# Patient Record
Sex: MALE | Race: OTHER | HISPANIC OR LATINO | URBAN - METROPOLITAN AREA
[De-identification: names, ages, dates, MRNs, and addresses within clinical notes are randomized per-mention and may not be internally consistent; named-entity substitution may affect disease eponyms.]

---

## 2020-09-21 PROBLEM — Z00.00 ENCOUNTER FOR PREVENTIVE HEALTH EXAMINATION: Status: ACTIVE | Noted: 2020-09-21

## 2020-09-22 ENCOUNTER — INPATIENT (INPATIENT)
Facility: HOSPITAL | Age: 35
LOS: 10 days | Discharge: HOME HEALTH | End: 2020-10-03
Attending: STUDENT IN AN ORGANIZED HEALTH CARE EDUCATION/TRAINING PROGRAM | Admitting: STUDENT IN AN ORGANIZED HEALTH CARE EDUCATION/TRAINING PROGRAM
Payer: MEDICAID

## 2020-09-22 VITALS
SYSTOLIC BLOOD PRESSURE: 114 MMHG | TEMPERATURE: 98 F | HEART RATE: 66 BPM | DIASTOLIC BLOOD PRESSURE: 70 MMHG | OXYGEN SATURATION: 97 % | RESPIRATION RATE: 20 BRPM

## 2020-09-22 DIAGNOSIS — I50.9 HEART FAILURE, UNSPECIFIED: ICD-10-CM

## 2020-09-22 DIAGNOSIS — Z91.14 PATIENT'S OTHER NONCOMPLIANCE WITH MEDICATION REGIMEN: ICD-10-CM

## 2020-09-22 DIAGNOSIS — I10 ESSENTIAL (PRIMARY) HYPERTENSION: ICD-10-CM

## 2020-09-22 DIAGNOSIS — E78.5 HYPERLIPIDEMIA, UNSPECIFIED: ICD-10-CM

## 2020-09-22 DIAGNOSIS — Z29.9 ENCOUNTER FOR PROPHYLACTIC MEASURES, UNSPECIFIED: ICD-10-CM

## 2020-09-22 DIAGNOSIS — E11.9 TYPE 2 DIABETES MELLITUS WITHOUT COMPLICATIONS: ICD-10-CM

## 2020-09-22 DIAGNOSIS — I50.20 UNSPECIFIED SYSTOLIC (CONGESTIVE) HEART FAILURE: ICD-10-CM

## 2020-09-22 LAB
ANION GAP SERPL CALC-SCNC: 14 MMO/L — SIGNIFICANT CHANGE UP (ref 7–14)
APTT BLD: 39.1 SEC — HIGH (ref 27–36.3)
BLD GP AB SCN SERPL QL: NEGATIVE — SIGNIFICANT CHANGE UP
BUN SERPL-MCNC: 24 MG/DL — HIGH (ref 7–23)
CALCIUM SERPL-MCNC: 9.6 MG/DL — SIGNIFICANT CHANGE UP (ref 8.4–10.5)
CHLORIDE SERPL-SCNC: 95 MMOL/L — LOW (ref 98–107)
CO2 SERPL-SCNC: 25 MMOL/L — SIGNIFICANT CHANGE UP (ref 22–31)
CREAT SERPL-MCNC: 1.2 MG/DL — SIGNIFICANT CHANGE UP (ref 0.5–1.3)
GLUCOSE BLDC GLUCOMTR-MCNC: 87 MG/DL — SIGNIFICANT CHANGE UP (ref 70–99)
GLUCOSE BLDC GLUCOMTR-MCNC: 91 MG/DL — SIGNIFICANT CHANGE UP (ref 70–99)
GLUCOSE SERPL-MCNC: 114 MG/DL — HIGH (ref 70–99)
HCT VFR BLD CALC: 56.9 % — HIGH (ref 39–50)
HGB BLD-MCNC: 17.5 G/DL — HIGH (ref 13–17)
INR BLD: 1.18 — HIGH (ref 0.88–1.16)
MAGNESIUM SERPL-MCNC: 2.3 MG/DL — SIGNIFICANT CHANGE UP (ref 1.6–2.6)
MCHC RBC-ENTMCNC: 25.6 PG — LOW (ref 27–34)
MCHC RBC-ENTMCNC: 30.8 % — LOW (ref 32–36)
MCV RBC AUTO: 83.3 FL — SIGNIFICANT CHANGE UP (ref 80–100)
NRBC # FLD: 0 K/UL — SIGNIFICANT CHANGE UP (ref 0–0)
PHOSPHATE SERPL-MCNC: 3.8 MG/DL — SIGNIFICANT CHANGE UP (ref 2.5–4.5)
PLATELET # BLD AUTO: 257 K/UL — SIGNIFICANT CHANGE UP (ref 150–400)
PMV BLD: 11.7 FL — SIGNIFICANT CHANGE UP (ref 7–13)
POTASSIUM SERPL-MCNC: 4.2 MMOL/L — SIGNIFICANT CHANGE UP (ref 3.5–5.3)
POTASSIUM SERPL-SCNC: 4.2 MMOL/L — SIGNIFICANT CHANGE UP (ref 3.5–5.3)
PROTHROM AB SERPL-ACNC: 13.4 SEC — SIGNIFICANT CHANGE UP (ref 10.6–13.6)
RBC # BLD: 6.83 M/UL — HIGH (ref 4.2–5.8)
RBC # FLD: 17.2 % — HIGH (ref 10.3–14.5)
RH IG SCN BLD-IMP: POSITIVE — SIGNIFICANT CHANGE UP
SARS-COV-2 RNA SPEC QL NAA+PROBE: SIGNIFICANT CHANGE UP
SODIUM SERPL-SCNC: 134 MMOL/L — LOW (ref 135–145)
WBC # BLD: 7.81 K/UL — SIGNIFICANT CHANGE UP (ref 3.8–10.5)
WBC # FLD AUTO: 7.81 K/UL — SIGNIFICANT CHANGE UP (ref 3.8–10.5)

## 2020-09-22 PROCEDURE — 99223 1ST HOSP IP/OBS HIGH 75: CPT

## 2020-09-22 RX ORDER — ASCORBIC ACID 60 MG
500 TABLET,CHEWABLE ORAL DAILY
Refills: 0 | Status: DISCONTINUED | OUTPATIENT
Start: 2020-09-22 | End: 2020-10-03

## 2020-09-22 RX ORDER — METOPROLOL TARTRATE 50 MG
50 TABLET ORAL DAILY
Refills: 0 | Status: DISCONTINUED | OUTPATIENT
Start: 2020-09-22 | End: 2020-10-03

## 2020-09-22 RX ORDER — INSULIN LISPRO 100/ML
VIAL (ML) SUBCUTANEOUS EVERY 6 HOURS
Refills: 0 | Status: DISCONTINUED | OUTPATIENT
Start: 2020-09-22 | End: 2020-09-22

## 2020-09-22 RX ORDER — DEXTROSE 50 % IN WATER 50 %
15 SYRINGE (ML) INTRAVENOUS ONCE
Refills: 0 | Status: DISCONTINUED | OUTPATIENT
Start: 2020-09-22 | End: 2020-10-03

## 2020-09-22 RX ORDER — SODIUM CHLORIDE 9 MG/ML
1000 INJECTION, SOLUTION INTRAVENOUS
Refills: 0 | Status: DISCONTINUED | OUTPATIENT
Start: 2020-09-22 | End: 2020-10-03

## 2020-09-22 RX ORDER — DEXTROSE 50 % IN WATER 50 %
12.5 SYRINGE (ML) INTRAVENOUS ONCE
Refills: 0 | Status: DISCONTINUED | OUTPATIENT
Start: 2020-09-22 | End: 2020-10-03

## 2020-09-22 RX ORDER — GLUCAGON INJECTION, SOLUTION 0.5 MG/.1ML
1 INJECTION, SOLUTION SUBCUTANEOUS ONCE
Refills: 0 | Status: DISCONTINUED | OUTPATIENT
Start: 2020-09-22 | End: 2020-10-03

## 2020-09-22 RX ORDER — ACETAMINOPHEN 500 MG
650 TABLET ORAL EVERY 6 HOURS
Refills: 0 | Status: DISCONTINUED | OUTPATIENT
Start: 2020-09-22 | End: 2020-10-03

## 2020-09-22 RX ORDER — ATORVASTATIN CALCIUM 80 MG/1
40 TABLET, FILM COATED ORAL AT BEDTIME
Refills: 0 | Status: DISCONTINUED | OUTPATIENT
Start: 2020-09-22 | End: 2020-10-03

## 2020-09-22 RX ORDER — PANTOPRAZOLE SODIUM 20 MG/1
40 TABLET, DELAYED RELEASE ORAL
Refills: 0 | Status: DISCONTINUED | OUTPATIENT
Start: 2020-09-22 | End: 2020-10-03

## 2020-09-22 RX ORDER — ISOSORBIDE MONONITRATE 60 MG/1
30 TABLET, EXTENDED RELEASE ORAL DAILY
Refills: 0 | Status: DISCONTINUED | OUTPATIENT
Start: 2020-09-22 | End: 2020-10-03

## 2020-09-22 RX ORDER — FUROSEMIDE 40 MG
40 TABLET ORAL DAILY
Refills: 0 | Status: DISCONTINUED | OUTPATIENT
Start: 2020-09-22 | End: 2020-09-26

## 2020-09-22 RX ORDER — ENOXAPARIN SODIUM 100 MG/ML
40 INJECTION SUBCUTANEOUS DAILY
Refills: 0 | Status: DISCONTINUED | OUTPATIENT
Start: 2020-09-22 | End: 2020-09-23

## 2020-09-22 RX ORDER — THIAMINE MONONITRATE (VIT B1) 100 MG
100 TABLET ORAL DAILY
Refills: 0 | Status: DISCONTINUED | OUTPATIENT
Start: 2020-09-22 | End: 2020-10-03

## 2020-09-22 RX ORDER — DEXTROSE 50 % IN WATER 50 %
25 SYRINGE (ML) INTRAVENOUS ONCE
Refills: 0 | Status: DISCONTINUED | OUTPATIENT
Start: 2020-09-22 | End: 2020-10-03

## 2020-09-22 RX ORDER — INSULIN LISPRO 100/ML
VIAL (ML) SUBCUTANEOUS
Refills: 0 | Status: DISCONTINUED | OUTPATIENT
Start: 2020-09-22 | End: 2020-10-03

## 2020-09-22 RX ORDER — LISINOPRIL 2.5 MG/1
40 TABLET ORAL DAILY
Refills: 0 | Status: DISCONTINUED | OUTPATIENT
Start: 2020-09-22 | End: 2020-10-02

## 2020-09-22 RX ORDER — FOLIC ACID 0.8 MG
1 TABLET ORAL DAILY
Refills: 0 | Status: DISCONTINUED | OUTPATIENT
Start: 2020-09-22 | End: 2020-10-03

## 2020-09-22 RX ORDER — HYDRALAZINE HCL 50 MG
75 TABLET ORAL EVERY 8 HOURS
Refills: 0 | Status: DISCONTINUED | OUTPATIENT
Start: 2020-09-22 | End: 2020-10-03

## 2020-09-22 RX ORDER — ASPIRIN/CALCIUM CARB/MAGNESIUM 324 MG
81 TABLET ORAL DAILY
Refills: 0 | Status: DISCONTINUED | OUTPATIENT
Start: 2020-09-22 | End: 2020-10-03

## 2020-09-22 RX ORDER — INSULIN LISPRO 100/ML
VIAL (ML) SUBCUTANEOUS AT BEDTIME
Refills: 0 | Status: DISCONTINUED | OUTPATIENT
Start: 2020-09-22 | End: 2020-10-03

## 2020-09-22 RX ADMIN — Medication 75 MILLIGRAM(S): at 21:49

## 2020-09-22 RX ADMIN — ATORVASTATIN CALCIUM 40 MILLIGRAM(S): 80 TABLET, FILM COATED ORAL at 21:49

## 2020-09-22 NOTE — H&P ADULT - NSHPPHYSICALEXAM_GEN_ALL_CORE
PHYSICAL EXAM:  GENERAL: NAD, well-developed  HEAD:  Atraumatic, Normocephalic  EYES: EOMI, PERRLA, conjunctiva and sclera clear  NECK: Supple, No JVD  CHEST/LUNG: Clear to auscultation bilaterally; No wheeze  HEART: Regular rate and rhythm; No murmurs, rubs, or gallops  ABDOMEN: Soft, Nontender, Nondistended; Bowel sounds present  EXTREMITIES:  2+ Peripheral Pulses, No clubbing, cyanosis, or edema  NEUROLOGY: AAOx4, moves all extremities 5/5   SKIN: No rashes or lesions

## 2020-09-22 NOTE — CONSULT NOTE ADULT - SUBJECTIVE AND OBJECTIVE BOX
Source: patient and Chart  : [id number]     HPI:  This is a 35 year old man with a pmhx of HFrEF (30-35%), NICM, HTN, T2DM, and HLD who present to [facility] for nausea and emesis for 4 days and found to have an EF <15% likely due to medical non-compliance.     Patient stated that he was having yellow vomiting that was blood tingled. Patient stated that it was associated with a HA that did not intensity with light or noise. He stated that the HA was a 6-7/10 in intensity all over his head. He noted no alleviating or aggravating factors. He also denied trying pain medication. He also stated that his heart was palpitating for the past 4 days. He denied lightheadedness, dizziness, changes in visions, cold like symptoms, CP, palpitation, SOB, abdominal pain, n/v/d, incontinence  and loss of bowel movement, numbness, and tingling. Patient course was c/b HTN emergency in the setting of HFrEF from medication noncompliant and increased his hydralazine medication to 75mg po q8h. Patient underwent an ischemic evaluation and  Cardiac computed tomography angiography was negative per OSH record and TTE revealed worsening EF (<15%). Patient was on the following optimal HF medication:  Lipitor, Imdur metoprolol Lasix and lisinopril. Of not, patient was covid-19 negative on 9/19/2020.  Patient was transferred to [facility] for a AICD.     Upon my evaluation, patient was in SR on telemetric with no events. Labs and covid-19 test pending. Patient was consent for a AICD for primary prevention for low EF with a NYHA III despite 3months of goal directive medical therapy. Patient was consented with a  ([id number]). The risk and benefits for each procedure were explain in detail which included but not limited to bleeding, infection, stroke, cardiac tamponade and death. Patient expressed understanding an all questions were answered.      PAST MEDICAL & SURGICAL HISTORY:  Heart failure with reduced ejection fraction  DM (diabetes mellitus)  HLD (hyperlipidemia)  HTN (hypertension)    MEDICATIONS  (STANDING):    MEDICATIONS  (PRN):  acetaminophen   Tablet .. 650 milliGRAM(s) Oral every 6 hours PRN Temp greater or equal to 38C (100.4F), Mild Pain (1 - 3)      FAMILY HISTORY:  Maternal grandmother: Passed away from an MI and great grandmother has a hx of HTN    SOCIAL HISTORY:  LIVING SITUATION: Alone  CIGARETTES: denied   ALCOHOL: denied   ILLICIT DRUG USES: denied     REVIEW OF SYSTEMS:  CONSTITUTIONAL: No fever, weight loss, chills, shakes, or fatigue  EYES: No eye pain, visual disturbances, or discharge  ENMT:  No difficulty hearing, tinnitus, vertigo; No sinus or throat pain  RESPIRATORY: per HPI  CARDIOVASCULAR: per HPI  GASTROINTESTINAL: No abdominal or epigastric pain, nausea, vomiting, hematemesis, diarrhea, constipation, melena or bright red blood.  GENITOURINARY: No dysuria, nocturia, hematuria, or urinary incontinence  NEUROLOGICAL: No headaches, memory loss, slurred speech, limb weakness, loss of strength, numbness, or tremors  MUSCULOSKELETAL: No joint pain or swelling, muscle, back, or extremity pain    Vital Signs Last 24 Hrs  T(C): 36.7 (22 Sep 2020 13:30), Max: 36.7 (22 Sep 2020 13:30)  T(F): 98.1 (22 Sep 2020 13:30), Max: 98.1 (22 Sep 2020 13:30)  HR: 66 (22 Sep 2020 13:30) (66 - 66)  BP: 114/70 (22 Sep 2020 13:30) (114/70 - 114/70)  BP(mean): --  RR: 20 (22 Sep 2020 13:30) (20 - 20)  SpO2: 97% (22 Sep 2020 13:30) (97% - 97%)    PHYSICAL EXAM:  GENERAL: Well appearing, speaking in full sentence, in NAD  HEART: S1S2 RRR; No murmurs, rubs, or gallops appreciated .  PULMONARY:CTABL, normal respiratory effort.  No rales, wheezing, or rhonchi appreciated bilaterally  ABDOMEN: Bowel sounds present, soft, NDNT  EXTREMITIES:  Warm, well -perfused, no pedal edema, distal pulses present  NEUROLOGICAL:AOx3 ,  motor function grossly  intact    INTERPRETATION OF TELEMETRY: SR with in the 80-90s  ECG:pending  I&O's Detail    LABS:  pending    RADIOLOGY & ADDITIONAL STUDIES:  TTE 9/15/2020 from OSH   - LVEF <15%  - Severely decreased global LV systolic function  - Multiple LV regional WMB  - Elevated LA and LVH  - Restrictive pattern of LV diastolic filling  - Severely increased LV internal cavity size   - Moderately dilated LA  - Mild MR  - Tethered mitral chika leaflets  - moderate elevated pulmonary artery systolic pressure Source: patient and Chart  : [id number]     HPI:  This is a 35 year old man with a pmhx of HFrEF (30-35%), NICM, HTN, T2DM, and HLD who present to [facility] for nausea and emesis for 4 days and found to have an EF <15% likely due to medical non-compliance.     Patient stated that he was having yellow vomiting that was blood tingled. Emesis was non-bilious. He had CT abdomen which revealed constipation. Patient stated that vomiting was associated with a HA that did not intensity with light or noise. He stated that the HA was a 6-7/10 in intensity and "all over his head". He noted no alleviating or aggravating factors. He also denied trying pain medication. He also stated that his heart was palpitating for the past 4 days. He denied lightheadedness, dizziness, changes in visions, cold like symptoms, CP, SOB, abdominal pain, diarrhea, incontinence  and loss of bowel movement, numbness, and tingling. Patient course was c/b HTN emergency in the setting of HFrEF from medication noncompliant and his hydralazine medication was increased to 75mg po q8h. Patient underwent an ischemic evaluation and  Cardiac computed tomography angiography was negative per OSH record and TTE revealed worsening EF (<15%). Patient was on the following optimal HF medication:  Lipitor, Imdur metoprolol Lasix and lisinopril. Of not, patient was covid-19 negative on 9/19/2020.  Patient was transferred to [facility] for a AICD.     Upon my evaluation, patient was in SR on telemetric with no events. Labs and covid-19 test pending. Patient denied HA, lightheadedness, dizziness, changes in visions, cold like symptoms, CP, palpitation, SOB, abdominal pain, and n/v/d. Patient was consent for a AICD for primary prevention for low EF with a NYHA III despite 3months of goal directive medical therapy. Patient was consented with a  ([id number]). The risk and benefits for each procedure were explain in detail which included but not limited to bleeding, infection, stroke, cardiac tamponade and death. Patient expressed understanding an all questions were answered.    PAST MEDICAL & SURGICAL HISTORY:  Heart failure with reduced ejection fraction  DM (diabetes mellitus)  HLD (hyperlipidemia)  HTN (hypertension)    MEDICATIONS  (STANDING):    MEDICATIONS  (PRN):  acetaminophen   Tablet .. 650 milliGRAM(s) Oral every 6 hours PRN Temp greater or equal to 38C (100.4F), Mild Pain (1 - 3)      FAMILY HISTORY:  Maternal grandmother: Passed away from an MI and great grandmother has a hx of HTN    SOCIAL HISTORY:  LIVING SITUATION: Alone  CIGARETTES: denied   ALCOHOL: denied   ILLICIT DRUG USES: denied     REVIEW OF SYSTEMS:  CONSTITUTIONAL: No fever, weight loss, chills, shakes, or fatigue  EYES: No eye pain, visual disturbances, or discharge  ENMT:  No difficulty hearing, tinnitus, vertigo; No sinus or throat pain  RESPIRATORY: per HPI  CARDIOVASCULAR: per HPI  GASTROINTESTINAL: No abdominal or epigastric pain, nausea, vomiting, hematemesis, diarrhea, constipation, melena or bright red blood.  GENITOURINARY: No dysuria, nocturia, hematuria, or urinary incontinence  NEUROLOGICAL: No headaches, memory loss, slurred speech, limb weakness, loss of strength, numbness, or tremors  MUSCULOSKELETAL: No joint pain or swelling, muscle, back, or extremity pain    Vital Signs Last 24 Hrs  T(C): 36.7 (22 Sep 2020 13:30), Max: 36.7 (22 Sep 2020 13:30)  T(F): 98.1 (22 Sep 2020 13:30), Max: 98.1 (22 Sep 2020 13:30)  HR: 66 (22 Sep 2020 13:30) (66 - 66)  BP: 114/70 (22 Sep 2020 13:30) (114/70 - 114/70)  BP(mean): --  RR: 20 (22 Sep 2020 13:30) (20 - 20)  SpO2: 97% (22 Sep 2020 13:30) (97% - 97%)    PHYSICAL EXAM:  GENERAL: Well appearing, speaking in full sentence, in NAD  HEART: S1S2 RRR; No murmurs, rubs, or gallops appreciated .  PULMONARY:CTABL, normal respiratory effort.  No rales, wheezing, or rhonchi appreciated bilaterally  ABDOMEN: Bowel sounds present, soft, NDNT  EXTREMITIES:  Warm, well -perfused, no pedal edema, distal pulses present  NEUROLOGICAL:AOx3 ,  motor function grossly  intact    INTERPRETATION OF TELEMETRY: SR with in the 80-90s  ECG:pending  I&O's Detail    LABS:  pending    RADIOLOGY & ADDITIONAL STUDIES:  TTE 9/15/2020 from OSH   - LVEF <15%  - Severely decreased global LV systolic function  - Multiple LV regional WMB  - Elevated LA and LVH  - Restrictive pattern of LV diastolic filling  - Severely increased LV internal cavity size   - Moderately dilated LA  - Mild MR  - Tethered mitral chika leaflets  - moderate elevated pulmonary artery systolic pressure Source: patient and Chart  : [id number]     HPI:  This is a 35 year old man with a pmhx of HFrEF (30-35%), NICM, HTN, T2DM, and HLD who present to [facility] for nausea and emesis for 4 days and found to have an EF <15% likely due to medical non-compliance.     Patient stated that he was having yellow vomiting that was blood tingled. Emesis was non-bilious. He had CT abdomen which revealed constipation. Patient stated that vomiting was associated with a HA that did not intensity with light or noise. He stated that the HA was a 6-7/10 in intensity and "all over his head". He noted no alleviating or aggravating factors. He also denied trying pain medication. He also stated that his heart was palpitating for the past 4 days. He denied lightheadedness, dizziness, changes in visions, cold like symptoms, CP, SOB, abdominal pain, diarrhea, incontinence  and loss of bowel movement, numbness, and tingling. Patient course was c/b HTN emergency in the setting of HFrEF from medication noncompliant and his hydralazine medication was increased to 75mg po q8h. Patient underwent an ischemic evaluation and  Cardiac computed tomography angiography was negative per OSH record and TTE revealed worsening EF (<15%). Patient was on the following optimal HF medication:  Lipitor, Imdur metoprolol Lasix and lisinopril. Of not, patient was covid-19 negative on 9/19/2020.  Patient was transferred to [facility] for a AICD.     Upon my evaluation, patient was in SR on telemetric with no events. Labs and covid-19 test pending. Patient denied HA, lightheadedness, dizziness, changes in visions, cold like symptoms, CP, palpitation, SOB, abdominal pain, and n/v/d. Patient was consent for a AICD for primary prevention for low EF with a NYHA III despite 3months of goal directive medical therapy. Patient was consented with a  ([id number]). The risk and benefits for each procedure were explain in detail which included but not limited to bleeding, infection, stroke, cardiac tamponade and death. Patient expressed understanding an all questions were answered.    PAST MEDICAL & SURGICAL HISTORY:  Heart failure with reduced ejection fraction  DM (diabetes mellitus)  HLD (hyperlipidemia)  HTN (hypertension)    MEDICATIONS  (STANDING):    MEDICATIONS  (PRN):  acetaminophen   Tablet .. 650 milliGRAM(s) Oral every 6 hours PRN Temp greater or equal to 38C (100.4F), Mild Pain (1 - 3)      FAMILY HISTORY:  Maternal grandmother: Passed away from an MI and great grandmother has a hx of HTN    SOCIAL HISTORY:  LIVING SITUATION: Alone  CIGARETTES: denied   ALCOHOL: denied   ILLICIT DRUG USES: denied     REVIEW OF SYSTEMS:  CONSTITUTIONAL: No fever, weight loss, chills, shakes, or fatigue  EYES: No eye pain, visual disturbances, or discharge  ENMT:  No difficulty hearing, tinnitus, vertigo; No sinus or throat pain  RESPIRATORY: per HPI  CARDIOVASCULAR: per HPI  GASTROINTESTINAL: No abdominal or epigastric pain, nausea, vomiting, hematemesis, diarrhea, constipation, melena or bright red blood.  GENITOURINARY: No dysuria, nocturia, hematuria, or urinary incontinence  NEUROLOGICAL: No headaches, memory loss, slurred speech, limb weakness, loss of strength, numbness, or tremors  MUSCULOSKELETAL: No joint pain or swelling, muscle, back, or extremity pain    Vital Signs Last 24 Hrs  T(C): 36.7 (22 Sep 2020 13:30), Max: 36.7 (22 Sep 2020 13:30)  T(F): 98.1 (22 Sep 2020 13:30), Max: 98.1 (22 Sep 2020 13:30)  HR: 66 (22 Sep 2020 13:30) (66 - 66)  BP: 114/70 (22 Sep 2020 13:30) (114/70 - 114/70)  BP(mean): --  RR: 20 (22 Sep 2020 13:30) (20 - 20)  SpO2: 97% (22 Sep 2020 13:30) (97% - 97%)    PHYSICAL EXAM:  GENERAL: obese man, Well appearing, speaking in full sentence, in NAD  HEART: S1S2 RRR; No murmurs, rubs, or gallops appreciated .  PULMONARY:CTABL, normal respiratory effort.  No rales, wheezing, or rhonchi appreciated bilaterally  ABDOMEN: +BS, obese contour, soft, NDNT  EXTREMITIES:  Warm, well -perfused, no pedal edema, distal pulses present  NEUROLOGICAL:AOx3 ,  motor function grossly  intact    INTERPRETATION OF TELEMETRY: SR with in the 80-90s  ECG:pending  I&O's Detail    LABS:  pending    RADIOLOGY & ADDITIONAL STUDIES:  TTE 9/15/2020 from OSH   - LVEF <15%  - Severely decreased global LV systolic function  - Multiple LV regional WMB  - Elevated LA and LVH  - Restrictive pattern of LV diastolic filling  - Severely increased LV internal cavity size   - Moderately dilated LA  - Mild MR  - Tethered mitral chika leaflets  - moderate elevated pulmonary artery systolic pressure

## 2020-09-22 NOTE — H&P ADULT - NSHPSOCIALHISTORY_GEN_ALL_CORE
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

## 2020-09-22 NOTE — H&P ADULT - HISTORY OF PRESENT ILLNESS
***[place]  ID #[id number]***    35 year old male with hx of HTN, HLD, DM, heart failure, non ischemic dilated cardiomyopathy per cardiac CT was sent from [facility] for AICD placement. Patient went to [facility] for N/V for 3 days        Per patient, he was diagnosed with heart failure 1 year ago, does not follow up with a cardiologist and has only been on medications for 3 months because he cannot afford it. ***[facility]  ID #[id number]***    35 year old male with hx of HTN, HLD, DM, heart failure, non ischemic dilated cardiomyopathy per cardiac CT was sent from [facility] for AICD placement. Patient went to [facility] for N/V, abdominal pain for 3 days. N/V occurs after eating, mostly food, non-bilious, non-bloody. Patient was admitted to [facility] for hypertensive emergency in setting of HFrEF exacerbation from medication noncompliance. He had a recent ischemic evaluation which was negative for CAD. His most recent echo on 9/15/2020 showed EF of <15%. He was sent here for AICD evaluation. Denies abdominal pain, N/V/D, chest pain, palpitation, SOB.   Per patient, he was diagnosed with heart failure 1 year ago but does not follow up with a cardiologist. He stated that he takes his medications from "[facility]"  and has only been on medications for the last 3 months because he cannot afford it. ***[facility]  ID #[id number]***    35 year old male with hx of HTN, HLD, DM, heart failure, non ischemic dilated cardiomyopathy per cardiac CT was sent from [facility] for AICD placement. Patient went to [facility] for N/V, abdominal pain for 3 days. N/V occurs after eating, mostly food, non-bilious, non-bloody. He had CT abdomen done which showed constipation. He was found to be hypertensive and was admitted to [facility] for hypertensive emergency in setting of HFrEF exacerbation from medication noncompliance. He had a recent ischemic evaluation which was negative for CAD. His most recent echo on 9/15/2020 showed EF of <15%. He was sent here for AICD evaluation. Denies abdominal pain, N/V/D, chest pain, palpitation, SOB.   Per patient, he was diagnosed with heart failure 1 year ago but does not follow up with a cardiologist. He stated that he takes his medications from "[facility]"  and has only been on medications for the last 3 months because he cannot afford it.

## 2020-09-22 NOTE — CHART NOTE - NSCHARTNOTEFT_GEN_A_CORE
As per lab, as long as COVID swab is double-bagged, ok to send via tube. Will tube COVID swab down and follow results in anticipation of ICD tomorrow.

## 2020-09-22 NOTE — H&P ADULT - ATTENDING COMMENTS
Patient is a 35 year old man w/ past medical hx of HF reduced EF not compliant w/ medications due to not having money to pay for medications, HTN, HLD, DM who presents AICD placement. Patient EF <15% on echo at [facility]. Patient w/o acute complaints. Had presented to the outside hospital w/ complaints of nausea, abdominal pain, vomiting found to be in hypertensive urgency. Patient treated for hypertensive urgency and constipation w/ improvement of symptoms. Patient currently w/o complaints. Patient on exam sitting in chair NAD, cardiac normal S1 and S2 no murmurs, rubs, or gallops, lungs CTAB, abdomen soft non-tender, non-distended, lower extremities w/o edema, w/o rashes.   - NPO after midnight for AICD placement   - c/w metoprolol, lisinopril, lasix, hydralazine, atorvastatin  - social work consult regarding inability to afford medication  - ISS for DM management goal -180

## 2020-09-22 NOTE — H&P ADULT - PROBLEM SELECTOR PLAN 1
- non compliant with medications  - TTE on 9/15/20 with EF <15%  - no in distress, no SOB, no LE swelling   - continue lasix 40 PO daily  - continue lisinopril 40 mg PO OD  - continue metoprolol XL 50 mg daily   - EP aware of patient for AICD evaluation   - NPO for now

## 2020-09-22 NOTE — H&P ADULT - NSHPREVIEWOFSYSTEMS_GEN_ALL_CORE
CONSTITUTIONAL: denies fever, chills or night sweats.   EYES: denies eye pain or blurry vision.  ENT: denies ear pain, nose bleed or sore throat.  NECK: denies neck pain or stiffness.  RESPIRATORY: denies SOB, cough or wheezing.   CV: denies chest pain or palpitation.  GI: denies abdominal pain, N/V, diarrhea, constipation, melena or hematochezia.  : denies dysuria, hematuria or frequency.   NEUROLOGICAL: +headache; denies weakness, numbness or tingling sensation.  SKIN: denies itching, burning or rashes.   MUSCULOSKELETAL: denies joint pain, swelling or muscle pain.   HEME: denies easy bruising, or bleeding gums

## 2020-09-22 NOTE — H&P ADULT - ASSESSMENT
35 year old male with hx of HTN, HLD, DM, heart failure, non ischemic dilated cardiomyopathy per cardiac CT was admitted at [facility] for HFrEF exacerbation due to medication non-compliance. Patient was sent from [facility] for AICD placement.

## 2020-09-22 NOTE — CONSULT NOTE ADULT - ASSESSMENT
This is a 35 year old man with a pmhx of HFrEF (30-35%), NICM, HTN, T2DM, and HLD who present to [facility] for nausea and emesis for 4 days and course was c/b HTN emergency in the setting of HFrEF from medication noncompliant and increased his hydralazine medication to 75mg po q8h. Patient underwent an ischemic evaluation and cardiac computed tomography angiography was negative per OSH record and TTE revealed worsening EF (<15%). Patient was transferred to [facility] for a AICD for a AICD for primary prevention given that the patient has a low EF with a NYHA III despite more than 3months of goal directive medical therapy. Patient was consented with a  ([id number]). The risk and benefits for each procedure were explain in detail which included but not limited to bleeding, infection, stroke, cardiac tamponade and death. Patient expressed understanding an all questions were answered.      Plan:  HFrEF (<15%) plan for AICD on 9/23/2020  Continues telemetric monitor for Afib  Replete electrolytes for K<4 and Mg<2  Covid-19 negative on 9/19/2020 from OSH  Patient was consent for a AICD for primary preventative for a low EF with a NYHA III despite 3months of goal directive medical therapy. . The risk and benefits for each procedure were explain in detail which included but not limited to bleeding, infection, stroke, cardiac tamponade and death. Patient expressed understanding an all questions were answered  - NPO after MN for AICD  - Repeat COVID-19 PCR  - Ordered CMP, CBC, PT/INR, PTT  - Order and f/u EKG     Mika Carvalho PA-C  Patient to be staffed with attending. Please await attending addendum This is a 35 year old man with a pmhx of HFrEF (30-35%), NICM, HTN, T2DM, and HLD who present to [facility] for nausea and emesis for 4 days and course was c/b HTN emergency in the setting of HFrEF from medication noncompliant and increased his hydralazine medication to 75mg po q8h. Patient underwent an ischemic evaluation and cardiac computed tomography angiography was negative per OSH record and TTE revealed worsening EF (<15%). Patient was transferred to [facility] for a AICD for a AICD for primary prevention given that the patient has a low EF with a NYHA III despite more than 3months of goal directive medical therapy. Patient was consented with a  ([id number]). The risk and benefits for each procedure were explain in detail which included but not limited to bleeding, infection, stroke, cardiac tamponade and death. Patient expressed understanding an all questions were answered.    Plan:  HFrEF (<15%) plan for AICD on 9/23/2020  Continuous telemetric monitor for Afib  Replete electrolytes for K<4 and Mg<2  Covid-19 negative on 9/19/2020 from OSH  Patient was consent for a AICD for primary preventative for a low EF with a NYHA III despite 3months of goal directive medical therapy. . The risk and benefits for each procedure were explain in detail which included but not limited to bleeding, infection, stroke, cardiac tamponade and death. Patient expressed understanding an all questions were answered  - NPO after MN for AICD  - Repeat COVID-19 PCR  - Ordered CMP, CBC, PT/INR, PTT  - Order and f/u EKG   -  Post-op PPM instruction has been verbal explain and given to the patient using a  [id number]       No scrubbing the incision site for 2 weeks      No lotion, ointment, powder or direct sunlight to the incision site for 2 weeks       No lifting more than 5lb or exertional exercising such as jogging, running, bike riding for 6-8 weeks      Do not get the surgical incision wet for 1 week      No swimming pool, Jacuzzi, or bath for 6-8 weeks. Allow the water to run over the dressing after 1 week      Pt was instructed to call [phone] if the following occurs:         - fever with temperature > 100.6         - swelling, drainage or bleeding at the site incision     Mika Carvalho PA-C  Patient to be staffed with attending. Please await attending addendum

## 2020-09-23 ENCOUNTER — APPOINTMENT (OUTPATIENT)
Dept: ELECTROPHYSIOLOGY | Facility: CLINIC | Age: 35
End: 2020-09-23

## 2020-09-23 LAB
ALBUMIN SERPL ELPH-MCNC: 4.3 G/DL — SIGNIFICANT CHANGE UP (ref 3.3–5)
ALP SERPL-CCNC: 160 U/L — HIGH (ref 40–120)
ALT FLD-CCNC: 28 U/L — SIGNIFICANT CHANGE UP (ref 4–41)
ANION GAP SERPL CALC-SCNC: 16 MMO/L — HIGH (ref 7–14)
AST SERPL-CCNC: 39 U/L — SIGNIFICANT CHANGE UP (ref 4–40)
BASOPHILS # BLD AUTO: 0.08 K/UL — SIGNIFICANT CHANGE UP (ref 0–0.2)
BASOPHILS NFR BLD AUTO: 1.2 % — SIGNIFICANT CHANGE UP (ref 0–2)
BILIRUB SERPL-MCNC: 1.2 MG/DL — SIGNIFICANT CHANGE UP (ref 0.2–1.2)
BUN SERPL-MCNC: 25 MG/DL — HIGH (ref 7–23)
CALCIUM SERPL-MCNC: 10 MG/DL — SIGNIFICANT CHANGE UP (ref 8.4–10.5)
CHLORIDE SERPL-SCNC: 96 MMOL/L — LOW (ref 98–107)
CO2 SERPL-SCNC: 24 MMOL/L — SIGNIFICANT CHANGE UP (ref 22–31)
CREAT SERPL-MCNC: 1.24 MG/DL — SIGNIFICANT CHANGE UP (ref 0.5–1.3)
CULTURE RESULTS: SIGNIFICANT CHANGE UP
EOSINOPHIL # BLD AUTO: 0.18 K/UL — SIGNIFICANT CHANGE UP (ref 0–0.5)
EOSINOPHIL NFR BLD AUTO: 2.6 % — SIGNIFICANT CHANGE UP (ref 0–6)
GLUCOSE BLDC GLUCOMTR-MCNC: 114 MG/DL — HIGH (ref 70–99)
GLUCOSE BLDC GLUCOMTR-MCNC: 114 MG/DL — HIGH (ref 70–99)
GLUCOSE BLDC GLUCOMTR-MCNC: 77 MG/DL — SIGNIFICANT CHANGE UP (ref 70–99)
GLUCOSE BLDC GLUCOMTR-MCNC: 77 MG/DL — SIGNIFICANT CHANGE UP (ref 70–99)
GLUCOSE BLDC GLUCOMTR-MCNC: 80 MG/DL — SIGNIFICANT CHANGE UP (ref 70–99)
GLUCOSE BLDC GLUCOMTR-MCNC: 99 MG/DL — SIGNIFICANT CHANGE UP (ref 70–99)
GLUCOSE SERPL-MCNC: 73 MG/DL — SIGNIFICANT CHANGE UP (ref 70–99)
HBA1C BLD-MCNC: 6.6 % — HIGH (ref 4–5.6)
HCT VFR BLD CALC: 60.6 % — CRITICAL HIGH (ref 39–50)
HGB BLD-MCNC: 18.4 G/DL — HIGH (ref 13–17)
IMM GRANULOCYTES NFR BLD AUTO: 0.3 % — SIGNIFICANT CHANGE UP (ref 0–1.5)
LYMPHOCYTES # BLD AUTO: 1.75 K/UL — SIGNIFICANT CHANGE UP (ref 1–3.3)
LYMPHOCYTES # BLD AUTO: 25.7 % — SIGNIFICANT CHANGE UP (ref 13–44)
MCHC RBC-ENTMCNC: 25.3 PG — LOW (ref 27–34)
MCHC RBC-ENTMCNC: 30.4 % — LOW (ref 32–36)
MCV RBC AUTO: 83.2 FL — SIGNIFICANT CHANGE UP (ref 80–100)
MONOCYTES # BLD AUTO: 0.83 K/UL — SIGNIFICANT CHANGE UP (ref 0–0.9)
MONOCYTES NFR BLD AUTO: 12.2 % — SIGNIFICANT CHANGE UP (ref 2–14)
NEUTROPHILS # BLD AUTO: 3.96 K/UL — SIGNIFICANT CHANGE UP (ref 1.8–7.4)
NEUTROPHILS NFR BLD AUTO: 58 % — SIGNIFICANT CHANGE UP (ref 43–77)
NRBC # FLD: 0 K/UL — SIGNIFICANT CHANGE UP (ref 0–0)
PLATELET # BLD AUTO: 232 K/UL — SIGNIFICANT CHANGE UP (ref 150–400)
PMV BLD: 11.9 FL — SIGNIFICANT CHANGE UP (ref 7–13)
POTASSIUM SERPL-MCNC: 4.2 MMOL/L — SIGNIFICANT CHANGE UP (ref 3.5–5.3)
POTASSIUM SERPL-SCNC: 4.2 MMOL/L — SIGNIFICANT CHANGE UP (ref 3.5–5.3)
PROT SERPL-MCNC: 8.1 G/DL — SIGNIFICANT CHANGE UP (ref 6–8.3)
RBC # BLD: 7.28 M/UL — HIGH (ref 4.2–5.8)
RBC # FLD: 17.2 % — HIGH (ref 10.3–14.5)
SODIUM SERPL-SCNC: 136 MMOL/L — SIGNIFICANT CHANGE UP (ref 135–145)
SPECIMEN SOURCE: SIGNIFICANT CHANGE UP
WBC # BLD: 6.82 K/UL — SIGNIFICANT CHANGE UP (ref 3.8–10.5)
WBC # FLD AUTO: 6.82 K/UL — SIGNIFICANT CHANGE UP (ref 3.8–10.5)

## 2020-09-23 PROCEDURE — 99232 SBSQ HOSP IP/OBS MODERATE 35: CPT | Mod: GC

## 2020-09-23 PROCEDURE — 99233 SBSQ HOSP IP/OBS HIGH 50: CPT

## 2020-09-23 RX ORDER — DEXTROSE 50 % IN WATER 50 %
12.5 SYRINGE (ML) INTRAVENOUS ONCE
Refills: 0 | Status: COMPLETED | OUTPATIENT
Start: 2020-09-23 | End: 2020-09-23

## 2020-09-23 RX ADMIN — ENOXAPARIN SODIUM 40 MILLIGRAM(S): 100 INJECTION SUBCUTANEOUS at 13:09

## 2020-09-23 RX ADMIN — Medication 81 MILLIGRAM(S): at 13:07

## 2020-09-23 RX ADMIN — ATORVASTATIN CALCIUM 40 MILLIGRAM(S): 80 TABLET, FILM COATED ORAL at 21:28

## 2020-09-23 RX ADMIN — Medication 75 MILLIGRAM(S): at 13:10

## 2020-09-23 RX ADMIN — ISOSORBIDE MONONITRATE 30 MILLIGRAM(S): 60 TABLET, EXTENDED RELEASE ORAL at 13:11

## 2020-09-23 RX ADMIN — Medication 100 MILLIGRAM(S): at 13:11

## 2020-09-23 RX ADMIN — PANTOPRAZOLE SODIUM 40 MILLIGRAM(S): 20 TABLET, DELAYED RELEASE ORAL at 06:35

## 2020-09-23 RX ADMIN — Medication 50 MILLIGRAM(S): at 05:38

## 2020-09-23 RX ADMIN — Medication 500 MILLIGRAM(S): at 13:09

## 2020-09-23 RX ADMIN — Medication 75 MILLIGRAM(S): at 05:38

## 2020-09-23 RX ADMIN — Medication 40 MILLIGRAM(S): at 05:38

## 2020-09-23 RX ADMIN — LISINOPRIL 40 MILLIGRAM(S): 2.5 TABLET ORAL at 05:38

## 2020-09-23 RX ADMIN — Medication 12.5 GRAM(S): at 06:05

## 2020-09-23 RX ADMIN — Medication 1 MILLIGRAM(S): at 13:10

## 2020-09-23 RX ADMIN — Medication 75 MILLIGRAM(S): at 22:45

## 2020-09-23 NOTE — PROGRESS NOTE ADULT - SUBJECTIVE AND OBJECTIVE BOX
Patient is a 35y old  Male who presents with a chief complaint of AICD placement (23 Sep 2020 11:22)    PROGRESS NOTE:   Authored by Dr. Porsche Aguiar MD, pager [phone]     Patient is a 35y old  Male who presents with a chief complaint of AICD placement (23 Sep 2020 11:22)      SUBJECTIVE / OVERNIGHT EVENTS:  No acute events ON. Pt denies CP or abdominal pain.     ADDITIONAL REVIEW OF SYSTEMS:    CONSTITUTIONAL: No weakness, fevers or chills  EYES/ENT: No visual changes;  No vertigo or throat pain   NECK: No pain or stiffness  RESPIRATORY: No cough, wheezing, hemoptysis; No shortness of breath  CARDIOVASCULAR: No chest pain or palpitations  GASTROINTESTINAL: No abdominal or epigastric pain. No nausea, vomiting, or hematemesis; No diarrhea or constipation. No melena or hematochezia.  GENITOURINARY: No dysuria, frequency or hematuria  NEUROLOGICAL: No numbness or weakness  PSYCH: No A/V hallucinations  MSK: No joint pain  SKIN: No itching, rashes    MEDICATIONS  (STANDING):  ascorbic acid 500 milliGRAM(s) Oral daily  aspirin  chewable 81 milliGRAM(s) Oral daily  atorvastatin 40 milliGRAM(s) Oral at bedtime  dextrose 5%. 1000 milliLiter(s) (50 mL/Hr) IV Continuous <Continuous>  dextrose 50% Injectable 12.5 Gram(s) IV Push once  dextrose 50% Injectable 25 Gram(s) IV Push once  dextrose 50% Injectable 25 Gram(s) IV Push once  enoxaparin Injectable 40 milliGRAM(s) SubCutaneous daily  folic acid 1 milliGRAM(s) Oral daily  furosemide    Tablet 40 milliGRAM(s) Oral daily  hydrALAZINE 75 milliGRAM(s) Oral every 8 hours  insulin lispro (HumaLOG) corrective regimen sliding scale   SubCutaneous three times a day before meals  insulin lispro (HumaLOG) corrective regimen sliding scale   SubCutaneous at bedtime  isosorbide   mononitrate ER Tablet (IMDUR) 30 milliGRAM(s) Oral daily  lisinopril 40 milliGRAM(s) Oral daily  metoprolol succinate ER 50 milliGRAM(s) Oral daily  pantoprazole    Tablet 40 milliGRAM(s) Oral before breakfast  thiamine 100 milliGRAM(s) Oral daily    MEDICATIONS  (PRN):  acetaminophen   Tablet .. 650 milliGRAM(s) Oral every 6 hours PRN Temp greater or equal to 38C (100.4F), Mild Pain (1 - 3)  dextrose 40% Gel 15 Gram(s) Oral once PRN Blood Glucose LESS THAN 70 milliGRAM(s)/deciliter  glucagon  Injectable 1 milliGRAM(s) IntraMuscular once PRN Glucose LESS THAN 70 milligrams/deciliter      CAPILLARY BLOOD GLUCOSE      POCT Blood Glucose.: 99 mg/dL (23 Sep 2020 12:04)  POCT Blood Glucose.: 80 mg/dL (23 Sep 2020 08:35)  POCT Blood Glucose.: 114 mg/dL (23 Sep 2020 06:44)  POCT Blood Glucose.: 77 mg/dL (23 Sep 2020 05:46)  POCT Blood Glucose.: 87 mg/dL (22 Sep 2020 22:02)  POCT Blood Glucose.: 91 mg/dL (22 Sep 2020 17:33)    I&O's Summary    22 Sep 2020 07:01  -  23 Sep 2020 07:00  --------------------------------------------------------  IN: 400 mL / OUT: 850 mL / NET: -450 mL    23 Sep 2020 07:01  -  23 Sep 2020 14:18  --------------------------------------------------------  IN: 0 mL / OUT: 700 mL / NET: -700 mL        PHYSICAL EXAM:  Vital Signs Last 24 Hrs  T(C): 36.8 (23 Sep 2020 13:04), Max: 37 (22 Sep 2020 18:45)  T(F): 98.3 (23 Sep 2020 13:04), Max: 98.6 (22 Sep 2020 18:45)  HR: 85 (23 Sep 2020 13:04) (72 - 85)  BP: 116/86 (23 Sep 2020 13:04) (113/79 - 130/74)  BP(mean): --  RR: 18 (23 Sep 2020 13:04) (17 - 20)  SpO2: 98% (23 Sep 2020 13:04) (98% - 99%)    CONSTITUTIONAL: NAD, well-developed  RESPIRATORY: Normal respiratory effort; lungs are clear to auscultation bilaterally  CARDIOVASCULAR: Regular rate and rhythm, normal S1 and S2, no murmur/rub/gallop; 1-2+ lower extremity edema; Peripheral pulses are 2+ bilaterally  ABDOMEN: Obese,  Nontender to palpation, normoactive bowel sounds, no rebound/guarding; No hepatosplenomegaly  MUSCULOSKELETAL: no clubbing or cyanosis of digits; no joint swelling or tenderness to palpation  Neuro: Non-focal  PSYCH: A+O to person, place, and time; affect appropriate    LABS:                        18.4   6.82  )-----------( 232      ( 23 Sep 2020 05:42 )             60.6     09-23    136  |  96<L>  |  25<H>  ----------------------------<  73  4.2   |  24  |  1.24    Ca    10.0      23 Sep 2020 05:42  Phos  3.8     09-22  Mg     2.3     09-22    TPro  8.1  /  Alb  4.3  /  TBili  1.2  /  DBili  x   /  AST  39  /  ALT  28  /  AlkPhos  160<H>  09-23    PT/INR - ( 22 Sep 2020 16:30 )   PT: 13.4 SEC;   INR: 1.18          PTT - ( 22 Sep 2020 16:30 )  PTT:39.1 SEC            RADIOLOGY & ADDITIONAL TESTS:  Results Reviewed:   Imaging Personally Reviewed:  Electrocardiogram Personally Reviewed:    COORDINATION OF CARE:  Care Discussed with Consultants/Other Providers [Y/N]:  Prior or Outpatient Records Reviewed [Y/N]:    MEDICATIONS  (STANDING):  ascorbic acid 500 milliGRAM(s) Oral daily  aspirin  chewable 81 milliGRAM(s) Oral daily  atorvastatin 40 milliGRAM(s) Oral at bedtime  dextrose 5%. 1000 milliLiter(s) (50 mL/Hr) IV Continuous <Continuous>  dextrose 50% Injectable 12.5 Gram(s) IV Push once  dextrose 50% Injectable 25 Gram(s) IV Push once  dextrose 50% Injectable 25 Gram(s) IV Push once  enoxaparin Injectable 40 milliGRAM(s) SubCutaneous daily  folic acid 1 milliGRAM(s) Oral daily  furosemide    Tablet 40 milliGRAM(s) Oral daily  hydrALAZINE 75 milliGRAM(s) Oral every 8 hours  insulin lispro (HumaLOG) corrective regimen sliding scale   SubCutaneous three times a day before meals  insulin lispro (HumaLOG) corrective regimen sliding scale   SubCutaneous at bedtime  isosorbide   mononitrate ER Tablet (IMDUR) 30 milliGRAM(s) Oral daily  lisinopril 40 milliGRAM(s) Oral daily  metoprolol succinate ER 50 milliGRAM(s) Oral daily  pantoprazole    Tablet 40 milliGRAM(s) Oral before breakfast  thiamine 100 milliGRAM(s) Oral daily    MEDICATIONS  (PRN):  acetaminophen   Tablet .. 650 milliGRAM(s) Oral every 6 hours PRN Temp greater or equal to 38C (100.4F), Mild Pain (1 - 3)  dextrose 40% Gel 15 Gram(s) Oral once PRN Blood Glucose LESS THAN 70 milliGRAM(s)/deciliter  glucagon  Injectable 1 milliGRAM(s) IntraMuscular once PRN Glucose LESS THAN 70 milligrams/deciliter      CAPILLARY BLOOD GLUCOSE      POCT Blood Glucose.: 99 mg/dL (23 Sep 2020 12:04)  POCT Blood Glucose.: 80 mg/dL (23 Sep 2020 08:35)  POCT Blood Glucose.: 114 mg/dL (23 Sep 2020 06:44)  POCT Blood Glucose.: 77 mg/dL (23 Sep 2020 05:46)  POCT Blood Glucose.: 87 mg/dL (22 Sep 2020 22:02)  POCT Blood Glucose.: 91 mg/dL (22 Sep 2020 17:33)    I&O's Summary    22 Sep 2020 07:01  -  23 Sep 2020 07:00  --------------------------------------------------------  IN: 400 mL / OUT: 850 mL / NET: -450 mL    23 Sep 2020 07:01  -  23 Sep 2020 14:18  --------------------------------------------------------  IN: 0 mL / OUT: 700 mL / NET: -700 mL        PHYSICAL EXAM:  Vital Signs Last 24 Hrs  T(C): 36.8 (23 Sep 2020 13:04), Max: 37 (22 Sep 2020 18:45)  T(F): 98.3 (23 Sep 2020 13:04), Max: 98.6 (22 Sep 2020 18:45)  HR: 85 (23 Sep 2020 13:04) (72 - 85)  BP: 116/86 (23 Sep 2020 13:04) (113/79 - 130/74)  BP(mean): --  RR: 18 (23 Sep 2020 13:04) (17 - 20)  SpO2: 98% (23 Sep 2020 13:04) (98% - 99%)    CONSTITUTIONAL: NAD, well-developed  RESPIRATORY: Normal respiratory effort; lungs are clear to auscultation bilaterally  CARDIOVASCULAR: Regular rate and rhythm, normal S1 and S2, no murmur/rub/gallop; No lower extremity edema; Peripheral pulses are 2+ bilaterally  ABDOMEN: Nontender to palpation, normoactive bowel sounds, no rebound/guarding; No hepatosplenomegaly  MUSCULOSKELETAL: no clubbing or cyanosis of digits; no joint swelling or tenderness to palpation  PSYCH: A+O to person, place, and time; affect appropriate    LABS:                        18.4   6.82  )-----------( 232      ( 23 Sep 2020 05:42 )             60.6     09-23    136  |  96<L>  |  25<H>  ----------------------------<  73  4.2   |  24  |  1.24    Ca    10.0      23 Sep 2020 05:42  Phos  3.8     09-22  Mg     2.3     09-22    TPro  8.1  /  Alb  4.3  /  TBili  1.2  /  DBili  x   /  AST  39  /  ALT  28  /  AlkPhos  160<H>  09-23    PT/INR - ( 22 Sep 2020 16:30 )   PT: 13.4 SEC;   INR: 1.18          PTT - ( 22 Sep 2020 16:30 )  PTT:39.1 SEC            RADIOLOGY & ADDITIONAL TESTS:  Results Reviewed:   Imaging Personally Reviewed:  Electrocardiogram Personally Reviewed:    COORDINATION OF CARE:  Care Discussed with Consultants/Other Providers [Y/N]:  Prior or Outpatient Records Reviewed [Y/N]:

## 2020-09-23 NOTE — PROGRESS NOTE ADULT - SUBJECTIVE AND OBJECTIVE BOX
[id number]  Subjective: No new complaints. Denies HA, lightheadedness, dizziness, CP, SOB, abdominal pain, N/V.      Interval events:  - Tansferred from OSH for AICD placement  -Tentatively plan for AICD placement today. Patient should remain NPO until after the procedure    -Patient was consent for a AICD for primary preventative for a low EF with a NYHA III despite 3months of goal directive medical therapy. . The risk and benefits for each procedure were explain in detail which included but not limited to bleeding, infection, stroke, cardiac tamponade and death. Patient expressed understanding an all questions were answered  - Post-op PPM instruction has been verbal explain and given to the patient using a  [id number]      MEDICATIONS  (STANDING):  ascorbic acid 500 milliGRAM(s) Oral daily  aspirin  chewable 81 milliGRAM(s) Oral daily  atorvastatin 40 milliGRAM(s) Oral at bedtime  dextrose 5%. 1000 milliLiter(s) (50 mL/Hr) IV Continuous <Continuous>  dextrose 50% Injectable 12.5 Gram(s) IV Push once  dextrose 50% Injectable 25 Gram(s) IV Push once  dextrose 50% Injectable 25 Gram(s) IV Push once  enoxaparin Injectable 40 milliGRAM(s) SubCutaneous daily  folic acid 1 milliGRAM(s) Oral daily  furosemide    Tablet 40 milliGRAM(s) Oral daily  hydrALAZINE 75 milliGRAM(s) Oral every 8 hours  insulin lispro (HumaLOG) corrective regimen sliding scale   SubCutaneous three times a day before meals  insulin lispro (HumaLOG) corrective regimen sliding scale   SubCutaneous at bedtime  isosorbide   mononitrate ER Tablet (IMDUR) 30 milliGRAM(s) Oral daily  lisinopril 40 milliGRAM(s) Oral daily  metoprolol succinate ER 50 milliGRAM(s) Oral daily  pantoprazole    Tablet 40 milliGRAM(s) Oral before breakfast  thiamine 100 milliGRAM(s) Oral daily    MEDICATIONS  (PRN):  acetaminophen   Tablet .. 650 milliGRAM(s) Oral every 6 hours PRN Temp greater or equal to 38C (100.4F), Mild Pain (1 - 3)  dextrose 40% Gel 15 Gram(s) Oral once PRN Blood Glucose LESS THAN 70 milliGRAM(s)/deciliter  glucagon  Injectable 1 milliGRAM(s) IntraMuscular once PRN Glucose LESS THAN 70 milligrams/deciliter    Vital Signs Last 24 Hrs  T(C): 36.2 (23 Sep 2020 05:30), Max: 37 (22 Sep 2020 18:45)  T(F): 97.1 (23 Sep 2020 05:30), Max: 98.6 (22 Sep 2020 18:45)  HR: 72 (23 Sep 2020 05:30) (66 - 77)  BP: 126/97 (23 Sep 2020 05:30) (113/79 - 130/74)  BP(mean): --  RR: 17 (23 Sep 2020 05:30) (17 - 20)  SpO2: 98% (23 Sep 2020 05:30) (97% - 99%)  I&O's Detail    22 Sep 2020 07:01  -  23 Sep 2020 07:00  --------------------------------------------------------  IN:    Oral Fluid: 400 mL  Total IN: 400 mL    OUT:    Voided (mL): 850 mL  Total OUT: 850 mL    Total NET: -450 mL      23 Sep 2020 07:01  -  23 Sep 2020 11:22  --------------------------------------------------------  IN:  Total IN: 0 mL    OUT:    Oral Fluid: 0 mL    Voided (mL): 400 mL  Total OUT: 400 mL    Total NET: -400 mL          Physical Exam:  GENERAL: Lying in bed, well appearing, speaking in full sentence in Greek with the , in NAD  ENMT: No tonsillar erythema, exudates, or enlargement; Moist mucous membranes, Good dentition, No lesions  NECK: Supple.   HEART: S1S2 RRR; No murmurs, rubs, or gallops appreciated .  PULMONARY:CTABL, normal respiratory effort.  No rales, wheezing, or rhonchi appreciated bilaterally  ABDOMEN: +BS, obese contour, soft, NDNTABDOMEN  EXTREMITIES:  Warm, well -perfused, no pedal edema, distal pulses present    TELEMETERIC: SR 70-90                            18.4   6.82  )-----------( 232      ( 23 Sep 2020 05:42 )             60.6     PT/INR - ( 22 Sep 2020 16:30 )   PT: 13.4 SEC;   INR: 1.18          PTT - ( 22 Sep 2020 16:30 )  PTT:39.1 SEC  09-23    136  |  96<L>  |  25<H>  ----------------------------<  73  4.2   |  24  |  1.24    Ca    10.0      23 Sep 2020 05:42  Phos  3.8     09-22  Mg     2.3     09-22  TTE 9/15/2020 from OSH   - LVEF <15%  - Severely decreased global LV systolic function  - Multiple LV regional WMB  - Elevated LA and LVH  - Restrictive pattern of LV diastolic filling  - Severely increased LV internal cavity size   - Moderately dilated LA  - Mild MR  - Tethered mitral chika leaflets  - moderate elevated pulmonary artery systolic pressure  TPro  8.1  /  Alb  4.3  /  TBili  1.2  /  DBili  x   /  AST  39  /  ALT  28  /  AlkPhos  160<H>  09-23          [id number]  Subjective: No new complaints. Denies HA, lightheadedness, dizziness, CP, SOB, abdominal pain, N/V.      Interval events:  - Tansferred from OSH for AICD placement  - AICD placement postponed today given elevated HCT given increase risk of thrombosis. Using a  [id number], patient was verbally made aware of this plan.  - Patient was consent for a AICD for primary preventative for a low EF with a NYHA III despite 3months of goal directive medical therapy. The risk and benefits for each procedure were explain in detail which included but not limited to bleeding, infection, stroke, cardiac tamponade and death. Patient expressed understanding an all questions were answered  - Post-op PPM instruction has been verbal explain and given to the patient using a  [id number]    MEDICATIONS  (STANDING):  ascorbic acid 500 milliGRAM(s) Oral daily  aspirin  chewable 81 milliGRAM(s) Oral daily  atorvastatin 40 milliGRAM(s) Oral at bedtime  dextrose 5%. 1000 milliLiter(s) (50 mL/Hr) IV Continuous <Continuous>  dextrose 50% Injectable 12.5 Gram(s) IV Push once  dextrose 50% Injectable 25 Gram(s) IV Push once  dextrose 50% Injectable 25 Gram(s) IV Push once  enoxaparin Injectable 40 milliGRAM(s) SubCutaneous daily  folic acid 1 milliGRAM(s) Oral daily  furosemide    Tablet 40 milliGRAM(s) Oral daily  hydrALAZINE 75 milliGRAM(s) Oral every 8 hours  insulin lispro (HumaLOG) corrective regimen sliding scale   SubCutaneous three times a day before meals  insulin lispro (HumaLOG) corrective regimen sliding scale   SubCutaneous at bedtime  isosorbide   mononitrate ER Tablet (IMDUR) 30 milliGRAM(s) Oral daily  lisinopril 40 milliGRAM(s) Oral daily  metoprolol succinate ER 50 milliGRAM(s) Oral daily  pantoprazole    Tablet 40 milliGRAM(s) Oral before breakfast  thiamine 100 milliGRAM(s) Oral daily    MEDICATIONS  (PRN):  acetaminophen   Tablet .. 650 milliGRAM(s) Oral every 6 hours PRN Temp greater or equal to 38C (100.4F), Mild Pain (1 - 3)  dextrose 40% Gel 15 Gram(s) Oral once PRN Blood Glucose LESS THAN 70 milliGRAM(s)/deciliter  glucagon  Injectable 1 milliGRAM(s) IntraMuscular once PRN Glucose LESS THAN 70 milligrams/deciliter    Vital Signs Last 24 Hrs  T(C): 36.2 (23 Sep 2020 05:30), Max: 37 (22 Sep 2020 18:45)  T(F): 97.1 (23 Sep 2020 05:30), Max: 98.6 (22 Sep 2020 18:45)  HR: 72 (23 Sep 2020 05:30) (66 - 77)  BP: 126/97 (23 Sep 2020 05:30) (113/79 - 130/74)  BP(mean): --  RR: 17 (23 Sep 2020 05:30) (17 - 20)  SpO2: 98% (23 Sep 2020 05:30) (97% - 99%)  I&O's Detail    22 Sep 2020 07:01  -  23 Sep 2020 07:00  --------------------------------------------------------  IN:    Oral Fluid: 400 mL  Total IN: 400 mL    OUT:    Voided (mL): 850 mL  Total OUT: 850 mL    Total NET: -450 mL      23 Sep 2020 07:01  -  23 Sep 2020 11:22  --------------------------------------------------------  IN:  Total IN: 0 mL    OUT:    Oral Fluid: 0 mL    Voided (mL): 400 mL  Total OUT: 400 mL    Total NET: -400 mL      Physical Exam:  GENERAL: Lying in bed, well appearing, speaking in full sentence in [place]ian with the , in NAD  ENMT: No tonsillar erythema, exudates, or enlargement; Moist mucous membranes, Good dentition, No lesions  NECK: Supple.   HEART: S1S2 RRR; No murmurs, rubs, or gallops appreciated .  PULMONARY:CTABL, normal respiratory effort.  No rales, wheezing, or rhonchi appreciated bilaterally  ABDOMEN: +BS, obese contour, soft, NDNT  EXTREMITIES:  Warm, well -perfused, no pedal edema, distal pulses present    TELEMETERIC: SR 70-90                          18.4   6.82  )-----------( 232      ( 23 Sep 2020 05:42 )             60.6     PT/INR - ( 22 Sep 2020 16:30 )   PT: 13.4 SEC;   INR: 1.18          PTT - ( 22 Sep 2020 16:30 )  PTT:39.1 SEC  09-23    136  |  96<L>  |  25<H>  ----------------------------<  73  4.2   |  24  |  1.24    Ca    10.0      23 Sep 2020 05:42  Phos  3.8     09-22  Mg     2.3     09-22    TTE 9/15/2020 from OSH   - LVEF <15%  - Severely decreased global LV systolic function  - Multiple LV regional WMB  - Elevated LA and LVH  - Restrictive pattern of LV diastolic filling  - Severely increased LV internal cavity size   - Moderately dilated LA  - Mild MR  - Tethered mitral chika leaflets  - moderate elevated pulmonary artery systolic pressure

## 2020-09-23 NOTE — PROGRESS NOTE ADULT - ASSESSMENT
This is a 35 year old man with a pmhx of HFrEF (30-35%), NICM, HTN, T2DM, and HLD who present to an OSH for nausea and emesis for 4 days and course was c/b HTN emergency in the setting of HFrEF from medication noncompliant and increased his hydralazine medication to 75mg po q8h. Patient underwent an ischemic evaluation and cardiac computed tomography angiography was negative per OSH record and TTE revealed worsening EF (<15%). Patient was transferred to [facility] for a AICD for a AICD for primary prevention given that the patient has a low EF with a NYHA III despite more than 3months of goal directive medical therapy. Patient was consented with a  ([id number]). The risk and benefits for each procedure were explain in detail which included but not limited to bleeding, infection, stroke, cardiac tamponade and death. Patient expressed understanding an all questions were answered.    Plan:  HFrEF (<15%) plan for AICD on 9/23/2020  -Continuous telemetric monitor for Afib  -Replete electrolytes for K<4 and Mg<2  - Covid-19 negative on 9/19/2020 from OSH and on 9/22/2020  -Patient was consent for a AICD for primary preventative for a low EF with a NYHA III despite 3months of goal directive medical therapy . The risk and benefits for each procedure were explain in detail which included but not limited to bleeding, infection, stroke, cardiac tamponade and death. Patient expressed understanding an all questions were answered  - NPO after until after AICD is placed  -  Post-op PPM instruction has been verbal explain and given to the patient using a  [id number]      No scrubbing the incision site for 2 weeks      No lotion, ointment, powder or direct sunlight to the incision site for 2 weeks       No lifting more than 5lb or exertional exercising such as jogging, running, bike riding for 6-8 weeks      Do not get the surgical incision wet for 1 week      No swimming pool, Jacuzzi, or bath for 6-8 weeks. Allow the water to run over the dressing after 1 week      Pt was instructed to call [phone] if the following occurs:         - fever with temperature > 100.6         - swelling, drainage or bleeding at the site incision     Mika Carvalho PA-C  Patient to be staff with attending. Please awaiting attending addendum      This is a 35 year old man with a pmhx of HFrEF (30-35%), NICM, HTN, T2DM, and HLD who present to an OSH for nausea and emesis for 4 days and course was c/b HTN emergency in the setting of HFrEF from medication noncompliant and increased his hydralazine medication to 75mg po q8h. Patient underwent an ischemic evaluation and cardiac computed tomography angiography was negative per OSH record and TTE revealed worsening EF (<15%). Patient was transferred to [facility] for a AICD for a AICD for primary prevention given that the patient has a low EF with a NYHA III despite more than 3months of goal directive medical therapy. Patient was consented with a  ([id number]). The risk and benefits for each procedure were explain in detail which included but not limited to bleeding, infection, stroke, cardiac tamponade and death. Patient expressed understanding an all questions were answered. Awaiting Hematology recommendation prior to AICD placement     Plan:  HFrEF (<15%) plan for AICD on 9/23/2020  - Continuous telemetric monitor for Afib  - Replete electrolytes for K<4 and Mg<2  - Covid-19 negative on 9/19/2020 from OSH and on 9/22/2020  - Patient was consent for a AICD for primary preventative for a low EF with a NYHA III despite 3months of goal directive medical therapy . The risk and benefits for each procedure were explain in detail which included but not limited to bleeding, infection, stroke, cardiac tamponade and death. Patient expressed understanding an all questions were answered  - Consult hematology for elevated HCT. Will postponed AICD placement until hematology weights in given increase risk for thrombosis   - Post-op PPM instruction has been verbal explain and given to the patient using a  [id number]      No scrubbing the incision site for 2 weeks      No lotion, ointment, powder or direct sunlight to the incision site for 2 weeks       No lifting more than 5lb or exertional exercising such as jogging, running, bike riding for 6-8 weeks      Do not get the surgical incision wet for 1 week      No swimming pool, Jacuzzi, or bath for 6-8 weeks. Allow the water to run over the dressing after 1 week      Pt was instructed to call [phone] if the following occurs:         - fever with temperature > 100.6         - swelling, drainage or bleeding at the site incision     Mika Carvalho PA-C  Patient to be staff with attending. Please awaiting attending addendum

## 2020-09-23 NOTE — PROGRESS NOTE ADULT - ASSESSMENT
35 year old male with hx of HTN, HLD, DM, heart failure, non ischemic dilated cardiomyopathy per cardiac CT was admitted at [facility] for HFrEF exacerbation due to medication non-compliance. Patient was sent from [facility] for AICD placement, scheduled for today 9/23

## 2020-09-24 DIAGNOSIS — D75.1 SECONDARY POLYCYTHEMIA: ICD-10-CM

## 2020-09-24 LAB
ANION GAP SERPL CALC-SCNC: 19 MMO/L — HIGH (ref 7–14)
BASOPHILS # BLD AUTO: 0.1 K/UL — SIGNIFICANT CHANGE UP (ref 0–0.2)
BASOPHILS NFR BLD AUTO: 1.4 % — SIGNIFICANT CHANGE UP (ref 0–2)
BUN SERPL-MCNC: 28 MG/DL — HIGH (ref 7–23)
CALCIUM SERPL-MCNC: 10.3 MG/DL — SIGNIFICANT CHANGE UP (ref 8.4–10.5)
CHLORIDE SERPL-SCNC: 93 MMOL/L — LOW (ref 98–107)
CO2 SERPL-SCNC: 21 MMOL/L — LOW (ref 22–31)
CREAT SERPL-MCNC: 1.3 MG/DL — SIGNIFICANT CHANGE UP (ref 0.5–1.3)
EOSINOPHIL # BLD AUTO: 0.14 K/UL — SIGNIFICANT CHANGE UP (ref 0–0.5)
EOSINOPHIL NFR BLD AUTO: 2 % — SIGNIFICANT CHANGE UP (ref 0–6)
FERRITIN SERPL-MCNC: 231.8 NG/ML — SIGNIFICANT CHANGE UP (ref 30–400)
GLUCOSE BLDC GLUCOMTR-MCNC: 101 MG/DL — HIGH (ref 70–99)
GLUCOSE BLDC GLUCOMTR-MCNC: 104 MG/DL — HIGH (ref 70–99)
GLUCOSE BLDC GLUCOMTR-MCNC: 113 MG/DL — HIGH (ref 70–99)
GLUCOSE BLDC GLUCOMTR-MCNC: 45 MG/DL — CRITICAL LOW (ref 70–99)
GLUCOSE BLDC GLUCOMTR-MCNC: 70 MG/DL — SIGNIFICANT CHANGE UP (ref 70–99)
GLUCOSE BLDC GLUCOMTR-MCNC: 81 MG/DL — SIGNIFICANT CHANGE UP (ref 70–99)
GLUCOSE BLDC GLUCOMTR-MCNC: 85 MG/DL — SIGNIFICANT CHANGE UP (ref 70–99)
GLUCOSE BLDC GLUCOMTR-MCNC: 99 MG/DL — SIGNIFICANT CHANGE UP (ref 70–99)
GLUCOSE SERPL-MCNC: 70 MG/DL — SIGNIFICANT CHANGE UP (ref 70–99)
HCT VFR BLD CALC: 60.7 % — CRITICAL HIGH (ref 39–50)
HGB BLD-MCNC: 18.6 G/DL — HIGH (ref 13–17)
IMM GRANULOCYTES NFR BLD AUTO: 0.3 % — SIGNIFICANT CHANGE UP (ref 0–1.5)
IRON SATN MFR SERPL: 153 UG/DL — SIGNIFICANT CHANGE UP (ref 45–165)
IRON SATN MFR SERPL: 448 UG/DL — SIGNIFICANT CHANGE UP (ref 155–535)
LDH SERPL L TO P-CCNC: 341 U/L — HIGH (ref 135–225)
LYMPHOCYTES # BLD AUTO: 1.72 K/UL — SIGNIFICANT CHANGE UP (ref 1–3.3)
LYMPHOCYTES # BLD AUTO: 24.5 % — SIGNIFICANT CHANGE UP (ref 13–44)
MAGNESIUM SERPL-MCNC: 2.1 MG/DL — SIGNIFICANT CHANGE UP (ref 1.6–2.6)
MCHC RBC-ENTMCNC: 25.8 PG — LOW (ref 27–34)
MCHC RBC-ENTMCNC: 30.6 % — LOW (ref 32–36)
MCV RBC AUTO: 84.1 FL — SIGNIFICANT CHANGE UP (ref 80–100)
MONOCYTES # BLD AUTO: 0.82 K/UL — SIGNIFICANT CHANGE UP (ref 0–0.9)
MONOCYTES NFR BLD AUTO: 11.7 % — SIGNIFICANT CHANGE UP (ref 2–14)
NEUTROPHILS # BLD AUTO: 4.23 K/UL — SIGNIFICANT CHANGE UP (ref 1.8–7.4)
NEUTROPHILS NFR BLD AUTO: 60.1 % — SIGNIFICANT CHANGE UP (ref 43–77)
NRBC # FLD: 0 K/UL — SIGNIFICANT CHANGE UP (ref 0–0)
PLATELET # BLD AUTO: 238 K/UL — SIGNIFICANT CHANGE UP (ref 150–400)
PMV BLD: 12.4 FL — SIGNIFICANT CHANGE UP (ref 7–13)
POTASSIUM SERPL-MCNC: 4.6 MMOL/L — SIGNIFICANT CHANGE UP (ref 3.5–5.3)
POTASSIUM SERPL-SCNC: 4.6 MMOL/L — SIGNIFICANT CHANGE UP (ref 3.5–5.3)
RBC # BLD: 7.22 M/UL — HIGH (ref 4.2–5.8)
RBC # FLD: 17.6 % — HIGH (ref 10.3–14.5)
SODIUM SERPL-SCNC: 133 MMOL/L — LOW (ref 135–145)
UIBC SERPL-MCNC: 295.1 UG/DL — SIGNIFICANT CHANGE UP (ref 110–370)
WBC # BLD: 7.03 K/UL — SIGNIFICANT CHANGE UP (ref 3.8–10.5)
WBC # FLD AUTO: 7.03 K/UL — SIGNIFICANT CHANGE UP (ref 3.8–10.5)

## 2020-09-24 PROCEDURE — 99232 SBSQ HOSP IP/OBS MODERATE 35: CPT | Mod: GC

## 2020-09-24 PROCEDURE — 99222 1ST HOSP IP/OBS MODERATE 55: CPT | Mod: GC

## 2020-09-24 PROCEDURE — 99233 SBSQ HOSP IP/OBS HIGH 50: CPT

## 2020-09-24 RX ADMIN — PANTOPRAZOLE SODIUM 40 MILLIGRAM(S): 20 TABLET, DELAYED RELEASE ORAL at 06:55

## 2020-09-24 RX ADMIN — Medication 100 MILLIGRAM(S): at 12:13

## 2020-09-24 RX ADMIN — Medication 40 MILLIGRAM(S): at 05:29

## 2020-09-24 RX ADMIN — Medication 1 MILLIGRAM(S): at 12:12

## 2020-09-24 RX ADMIN — Medication 50 MILLIGRAM(S): at 06:55

## 2020-09-24 RX ADMIN — Medication 500 MILLIGRAM(S): at 12:12

## 2020-09-24 RX ADMIN — Medication 81 MILLIGRAM(S): at 12:12

## 2020-09-24 RX ADMIN — Medication 75 MILLIGRAM(S): at 22:21

## 2020-09-24 RX ADMIN — LISINOPRIL 40 MILLIGRAM(S): 2.5 TABLET ORAL at 06:55

## 2020-09-24 RX ADMIN — ATORVASTATIN CALCIUM 40 MILLIGRAM(S): 80 TABLET, FILM COATED ORAL at 22:21

## 2020-09-24 RX ADMIN — Medication 75 MILLIGRAM(S): at 13:48

## 2020-09-24 RX ADMIN — ISOSORBIDE MONONITRATE 30 MILLIGRAM(S): 60 TABLET, EXTENDED RELEASE ORAL at 12:17

## 2020-09-24 RX ADMIN — Medication 75 MILLIGRAM(S): at 05:29

## 2020-09-24 NOTE — PROGRESS NOTE ADULT - PROBLEM SELECTOR PLAN 2
-patient with erythrocytosis, initially with normal rbc count and hgb on records that arrived with patient during transfer from [facility]  -patient with polycythemia, suspect secondary polycythemia as most likely over polycythemia vera, less likely, as patient has risk factors such as obesity; further, low EF could result in low kidney perfusion that could potentially trigger feedback mechanism to synthesize more erythropoietin  -epo level and ARMANDO 2 already sent, contacted hematology and requested for them to comment given that patient urgently needs his potentially life-saving ICD given his severely reduced EF -patient with erythrocytosis, initially with normal rbc count and hgb on records that arrived with patient during transfer from [facility]  -patient with polycythemia, suspect secondary polycythemia as most likely over polycythemia vera, as patient has risk factors such as obesity; further, low EF could result in low kidney perfusion that could potentially trigger feedback mechanism to synthesize more erythropoietin  -epo level and ARMANDO 2 already sent, if epo level is normal or high, less likely PV  -hematology consulted; recs appreciated

## 2020-09-24 NOTE — PROGRESS NOTE ADULT - SUBJECTIVE AND OBJECTIVE BOX
: [id number]    Subjective: No new complaints. Denies HA, lightheadedness, dizziness, CP, SOB, abdominal pain, N/V.      Interval events:  - AICD was postponed yesterday due to elevated HCT given increase risk of thrombosis  - Tentatively plan for Hematology consult today to determine if patient had polycythemia vera and for clearance before AICD placement.      MEDICATIONS  (STANDING):  ascorbic acid 500 milliGRAM(s) Oral daily  aspirin  chewable 81 milliGRAM(s) Oral daily  atorvastatin 40 milliGRAM(s) Oral at bedtime  dextrose 5%. 1000 milliLiter(s) (50 mL/Hr) IV Continuous <Continuous>  dextrose 50% Injectable 12.5 Gram(s) IV Push once  dextrose 50% Injectable 25 Gram(s) IV Push once  dextrose 50% Injectable 25 Gram(s) IV Push once  folic acid 1 milliGRAM(s) Oral daily  furosemide    Tablet 40 milliGRAM(s) Oral daily  hydrALAZINE 75 milliGRAM(s) Oral every 8 hours  insulin lispro (HumaLOG) corrective regimen sliding scale   SubCutaneous three times a day before meals  insulin lispro (HumaLOG) corrective regimen sliding scale   SubCutaneous at bedtime  isosorbide   mononitrate ER Tablet (IMDUR) 30 milliGRAM(s) Oral daily  lisinopril 40 milliGRAM(s) Oral daily  metoprolol succinate ER 50 milliGRAM(s) Oral daily  pantoprazole    Tablet 40 milliGRAM(s) Oral before breakfast  thiamine 100 milliGRAM(s) Oral daily    MEDICATIONS  (PRN):  acetaminophen   Tablet .. 650 milliGRAM(s) Oral every 6 hours PRN Temp greater or equal to 38C (100.4F), Mild Pain (1 - 3)  dextrose 40% Gel 15 Gram(s) Oral once PRN Blood Glucose LESS THAN 70 milliGRAM(s)/deciliter  glucagon  Injectable 1 milliGRAM(s) IntraMuscular once PRN Glucose LESS THAN 70 milligrams/deciliter    Vital Signs Last 24 Hrs  T(C): 36.8 (24 Sep 2020 05:23), Max: 36.8 (23 Sep 2020 13:04)  T(F): 98.2 (24 Sep 2020 05:23), Max: 98.3 (23 Sep 2020 13:04)  HR: 70 (24 Sep 2020 07:02) (70 - 85)  BP: 111/76 (24 Sep 2020 07:02) (98/72 - 124/51)  BP(mean): --  RR: 18 (24 Sep 2020 05:23) (17 - 18)  SpO2: 98% (24 Sep 2020 05:23) (98% - 98%)  I&O's Detail    23 Sep 2020 07:01  -  24 Sep 2020 07:00  --------------------------------------------------------  IN:    Oral Fluid: 350 mL  Total IN: 350 mL    OUT:    Voided (mL): 1250 mL  Total OUT: 1250 mL    Total NET: -900 mL      Physical Exam:  GENERAL: Lying in bed, well appearing, speaking in full sentence with the  in NAD  HEART: S1S2 RRR; No murmurs, rubs, or gallops appreciated  PULMONARY: CTABL, normal respiratory effort.  No rales, wheezing, or rhonchi appreciated bilaterally. No use of accessory muscles  ABDOMEN: + Bowel sounds present, soft, NDNT  EXTREMITIES:  Warm, well -perfused, no pedal edema, distal pulses present    TELEMETERIC: SR 80s no PVC or ectopy noted on                             18.6   7.03  )-----------( 238      ( 24 Sep 2020 07:10 )             60.7     PT/INR - ( 22 Sep 2020 16:30 )   PT: 13.4 SEC;   INR: 1.18          PTT - ( 22 Sep 2020 16:30 )  PTT:39.1 SEC  09-24    133<L>  |  93<L>  |  28<H>  ----------------------------<  70  4.6   |  21<L>  |  1.30    Ca    10.3      24 Sep 2020 07:10  Phos  3.8     09-22  Mg     2.1     09-24    TPro  8.1  /  Alb  4.3  /  TBili  1.2  /  DBili  x   /  AST  39  /  ALT  28  /  AlkPhos  160<H>  09-23                Assessment and Plan:    Patient to be staff with attending. Please awaiting attending addendum         TTE 9/15/2020 from OSH   - LVEF <15%  - Severely decreased global LV systolic function  - Multiple LV regional WMB  - Elevated LA and LVH  - Restrictive pattern of LV diastolic filling  - Severely increased LV internal cavity size   - Moderately dilated LA  - Mild MR  - Tethered mitral chika leaflets  - moderate elevated pulmonary artery systolic pressure     : [id number]    Subjective: No new complaints. Denies HA, lightheadedness, dizziness, CP, SOB, abdominal pain, N/V.      Interval events:  - AICD was postponed yesterday due to elevated Hgb/HCT given increase risk of thrombosis  - Tentatively plan for Hematology consult today to determine if patient had polycythemia vera and for clearance before AICD placement.      MEDICATIONS  (STANDING):  ascorbic acid 500 milliGRAM(s) Oral daily  aspirin  chewable 81 milliGRAM(s) Oral daily  atorvastatin 40 milliGRAM(s) Oral at bedtime  dextrose 5%. 1000 milliLiter(s) (50 mL/Hr) IV Continuous <Continuous>  dextrose 50% Injectable 12.5 Gram(s) IV Push once  dextrose 50% Injectable 25 Gram(s) IV Push once  dextrose 50% Injectable 25 Gram(s) IV Push once  folic acid 1 milliGRAM(s) Oral daily  furosemide    Tablet 40 milliGRAM(s) Oral daily  hydrALAZINE 75 milliGRAM(s) Oral every 8 hours  insulin lispro (HumaLOG) corrective regimen sliding scale   SubCutaneous three times a day before meals  insulin lispro (HumaLOG) corrective regimen sliding scale   SubCutaneous at bedtime  isosorbide   mononitrate ER Tablet (IMDUR) 30 milliGRAM(s) Oral daily  lisinopril 40 milliGRAM(s) Oral daily  metoprolol succinate ER 50 milliGRAM(s) Oral daily  pantoprazole    Tablet 40 milliGRAM(s) Oral before breakfast  thiamine 100 milliGRAM(s) Oral daily    MEDICATIONS  (PRN):  acetaminophen   Tablet .. 650 milliGRAM(s) Oral every 6 hours PRN Temp greater or equal to 38C (100.4F), Mild Pain (1 - 3)  dextrose 40% Gel 15 Gram(s) Oral once PRN Blood Glucose LESS THAN 70 milliGRAM(s)/deciliter  glucagon  Injectable 1 milliGRAM(s) IntraMuscular once PRN Glucose LESS THAN 70 milligrams/deciliter    Vital Signs Last 24 Hrs  T(C): 36.8 (24 Sep 2020 05:23), Max: 36.8 (23 Sep 2020 13:04)  T(F): 98.2 (24 Sep 2020 05:23), Max: 98.3 (23 Sep 2020 13:04)  HR: 70 (24 Sep 2020 07:02) (70 - 85)  BP: 111/76 (24 Sep 2020 07:02) (98/72 - 124/51)  BP(mean): --  RR: 18 (24 Sep 2020 05:23) (17 - 18)  SpO2: 98% (24 Sep 2020 05:23) (98% - 98%)  I&O's Detail    23 Sep 2020 07:01  -  24 Sep 2020 07:00  --------------------------------------------------------  IN:    Oral Fluid: 350 mL  Total IN: 350 mL    OUT:    Voided (mL): 1250 mL  Total OUT: 1250 mL    Total NET: -900 mL      Physical Exam:  GENERAL: Lying in bed, well appearing, speaking in full sentence with the  in NAD  HEART: S1S2 RRR; No murmurs, rubs, or gallops appreciated  PULMONARY: CTABL, normal respiratory effort.  No rales, wheezing, or rhonchi appreciated bilaterally. No use of accessory muscles  ABDOMEN: + Bowel sounds present, soft, NDNT  EXTREMITIES:  Warm, well -perfused, no pedal edema, distal pulses present    TELEMETERIC: SR 80s no PVC or ectopy noted on                             18.6   7.03  )-----------( 238      ( 24 Sep 2020 07:10 )             60.7     PT/INR - ( 22 Sep 2020 16:30 )   PT: 13.4 SEC;   INR: 1.18          PTT - ( 22 Sep 2020 16:30 )  PTT:39.1 SEC  09-24    133<L>  |  93<L>  |  28<H>  ----------------------------<  70  4.6   |  21<L>  |  1.30    Ca    10.3      24 Sep 2020 07:10  Phos  3.8     09-22  Mg     2.1     09-24    TPro  8.1  /  Alb  4.3  /  TBili  1.2  /  DBili  x   /  AST  39  /  ALT  28  /  AlkPhos  160<H>  09-23      TTE 9/15/2020 from [facility]   - LVEF <15%  - Severely decreased global LV systolic function  - Multiple LV regional WMB  - Elevated LA and LVH  - Restrictive pattern of LV diastolic filling  - Severely increased LV internal cavity size   - Moderately dilated LA  - Mild MR  - Tethered mitral chika leaflets  - moderate elevated pulmonary artery systolic pressure

## 2020-09-24 NOTE — PROGRESS NOTE ADULT - SUBJECTIVE AND OBJECTIVE BOX
PROGRESS NOTE:   Authored by Dr. Porsche Aguiar MD, pager [phone]     Patient is a 35y old  Male who presents with a chief complaint of AICD placement (24 Sep 2020 13:57)    No  services utilized as provider is native Irish speaker.     SUBJECTIVE / OVERNIGHT EVENTS:  Patient is doing well. He denies CP, SOB, or abdominal pain.     ADDITIONAL REVIEW OF SYSTEMS:  PROGRESS NOTE:   Authored by Dr. Porsche Aguiar MD, pager [phone]     Patient is a 35y old  Male who presents with a chief complaint of AICD placement (24 Sep 2020 13:57)      SUBJECTIVE / OVERNIGHT EVENTS:    ADDITIONAL REVIEW OF SYSTEMS:    MEDICATIONS  (STANDING):  ascorbic acid 500 milliGRAM(s) Oral daily  aspirin  chewable 81 milliGRAM(s) Oral daily  atorvastatin 40 milliGRAM(s) Oral at bedtime  dextrose 5%. 1000 milliLiter(s) (50 mL/Hr) IV Continuous <Continuous>  dextrose 50% Injectable 12.5 Gram(s) IV Push once  dextrose 50% Injectable 25 Gram(s) IV Push once  dextrose 50% Injectable 25 Gram(s) IV Push once  folic acid 1 milliGRAM(s) Oral daily  furosemide    Tablet 40 milliGRAM(s) Oral daily  hydrALAZINE 75 milliGRAM(s) Oral every 8 hours  insulin lispro (HumaLOG) corrective regimen sliding scale   SubCutaneous three times a day before meals  insulin lispro (HumaLOG) corrective regimen sliding scale   SubCutaneous at bedtime  isosorbide   mononitrate ER Tablet (IMDUR) 30 milliGRAM(s) Oral daily  lisinopril 40 milliGRAM(s) Oral daily  metoprolol succinate ER 50 milliGRAM(s) Oral daily  pantoprazole    Tablet 40 milliGRAM(s) Oral before breakfast  thiamine 100 milliGRAM(s) Oral daily    MEDICATIONS  (PRN):  acetaminophen   Tablet .. 650 milliGRAM(s) Oral every 6 hours PRN Temp greater or equal to 38C (100.4F), Mild Pain (1 - 3)  dextrose 40% Gel 15 Gram(s) Oral once PRN Blood Glucose LESS THAN 70 milliGRAM(s)/deciliter  glucagon  Injectable 1 milliGRAM(s) IntraMuscular once PRN Glucose LESS THAN 70 milligrams/deciliter      CAPILLARY BLOOD GLUCOSE      POCT Blood Glucose.: 113 mg/dL (24 Sep 2020 12:31)  POCT Blood Glucose.: 104 mg/dL (24 Sep 2020 09:14)  POCT Blood Glucose.: 99 mg/dL (24 Sep 2020 09:13)  POCT Blood Glucose.: 81 mg/dL (24 Sep 2020 08:45)  POCT Blood Glucose.: 70 mg/dL (24 Sep 2020 08:44)  POCT Blood Glucose.: 45 mg/dL (24 Sep 2020 08:43)  POCT Blood Glucose.: 114 mg/dL (23 Sep 2020 22:19)  POCT Blood Glucose.: 77 mg/dL (23 Sep 2020 17:11)    I&O's Summary    23 Sep 2020 07:01  -  24 Sep 2020 07:00  --------------------------------------------------------  IN: 350 mL / OUT: 1250 mL / NET: -900 mL    24 Sep 2020 07:01  -  24 Sep 2020 15:15  --------------------------------------------------------  IN: 600 mL / OUT: 200 mL / NET: 400 mL        PHYSICAL EXAM:  Vital Signs Last 24 Hrs  T(C): 37.1 (24 Sep 2020 14:07), Max: 37.1 (24 Sep 2020 14:07)  T(F): 98.8 (24 Sep 2020 14:07), Max: 98.8 (24 Sep 2020 14:07)  HR: 83 (24 Sep 2020 14:07) (70 - 84)  BP: 129/74 (24 Sep 2020 14:07) (98/72 - 129/74)  BP(mean): --  RR: 18 (24 Sep 2020 14:07) (17 - 18)  SpO2: 97% (24 Sep 2020 14:07) (97% - 98%)    CONSTITUTIONAL: NAD, well-developed  ENT: MMM, hearing intact  RESPIRATORY: Normal respiratory effort; lungs are clear to auscultation bilaterally  CARDIOVASCULAR: Regular rate and rhythm, normal S1 and S2, no murmur/rub/gallop; No lower extremity edema; Peripheral pulses are 2+ bilaterally  ABDOMEN: Obese. Nontender to palpation, normoactive bowel sounds, no rebound/guarding; No hepatosplenomegaly  MUSCULOSKELETAL: no clubbing or cyanosis of digits; no joint swelling or tenderness to palpation  PSYCH: A+O to person, place, and time; affect appropriate    LABS:                        18.6   7.03  )-----------( 238      ( 24 Sep 2020 07:10 )             60.7     09-24    133<L>  |  93<L>  |  28<H>  ----------------------------<  70  4.6   |  21<L>  |  1.30    Ca    10.3      24 Sep 2020 07:10  Phos  3.8     09-22  Mg     2.1     09-24    TPro  8.1  /  Alb  4.3  /  TBili  1.2  /  DBili  x   /  AST  39  /  ALT  28  /  AlkPhos  160<H>  09-23    PT/INR - ( 22 Sep 2020 16:30 )   PT: 13.4 SEC;   INR: 1.18          PTT - ( 22 Sep 2020 16:30 )  PTT:39.1 SEC            RADIOLOGY & ADDITIONAL TESTS:  Results Reviewed:   Imaging Personally Reviewed:  Electrocardiogram Personally Reviewed:    COORDINATION OF CARE:  Care Discussed with Consultants/Other Providers [Y/N]:  Prior or Outpatient Records Reviewed [Y/N]:    MEDICATIONS  (STANDING):  ascorbic acid 500 milliGRAM(s) Oral daily  aspirin  chewable 81 milliGRAM(s) Oral daily  atorvastatin 40 milliGRAM(s) Oral at bedtime  dextrose 5%. 1000 milliLiter(s) (50 mL/Hr) IV Continuous <Continuous>  dextrose 50% Injectable 12.5 Gram(s) IV Push once  dextrose 50% Injectable 25 Gram(s) IV Push once  dextrose 50% Injectable 25 Gram(s) IV Push once  folic acid 1 milliGRAM(s) Oral daily  furosemide    Tablet 40 milliGRAM(s) Oral daily  hydrALAZINE 75 milliGRAM(s) Oral every 8 hours  insulin lispro (HumaLOG) corrective regimen sliding scale   SubCutaneous three times a day before meals  insulin lispro (HumaLOG) corrective regimen sliding scale   SubCutaneous at bedtime  isosorbide   mononitrate ER Tablet (IMDUR) 30 milliGRAM(s) Oral daily  lisinopril 40 milliGRAM(s) Oral daily  metoprolol succinate ER 50 milliGRAM(s) Oral daily  pantoprazole    Tablet 40 milliGRAM(s) Oral before breakfast  thiamine 100 milliGRAM(s) Oral daily    MEDICATIONS  (PRN):  acetaminophen   Tablet .. 650 milliGRAM(s) Oral every 6 hours PRN Temp greater or equal to 38C (100.4F), Mild Pain (1 - 3)  dextrose 40% Gel 15 Gram(s) Oral once PRN Blood Glucose LESS THAN 70 milliGRAM(s)/deciliter  glucagon  Injectable 1 milliGRAM(s) IntraMuscular once PRN Glucose LESS THAN 70 milligrams/deciliter      CAPILLARY BLOOD GLUCOSE      POCT Blood Glucose.: 113 mg/dL (24 Sep 2020 12:31)  POCT Blood Glucose.: 104 mg/dL (24 Sep 2020 09:14)  POCT Blood Glucose.: 99 mg/dL (24 Sep 2020 09:13)  POCT Blood Glucose.: 81 mg/dL (24 Sep 2020 08:45)  POCT Blood Glucose.: 70 mg/dL (24 Sep 2020 08:44)  POCT Blood Glucose.: 45 mg/dL (24 Sep 2020 08:43)  POCT Blood Glucose.: 114 mg/dL (23 Sep 2020 22:19)  POCT Blood Glucose.: 77 mg/dL (23 Sep 2020 17:11)    I&O's Summary    23 Sep 2020 07:01  -  24 Sep 2020 07:00  --------------------------------------------------------  IN: 350 mL / OUT: 1250 mL / NET: -900 mL    24 Sep 2020 07:01  -  24 Sep 2020 15:12  --------------------------------------------------------  IN: 600 mL / OUT: 200 mL / NET: 400 mL        PHYSICAL EXAM:  Vital Signs Last 24 Hrs  T(C): 37.1 (24 Sep 2020 14:07), Max: 37.1 (24 Sep 2020 14:07)  T(F): 98.8 (24 Sep 2020 14:07), Max: 98.8 (24 Sep 2020 14:07)  HR: 83 (24 Sep 2020 14:07) (70 - 84)  BP: 129/74 (24 Sep 2020 14:07) (98/72 - 129/74)  BP(mean): --  RR: 18 (24 Sep 2020 14:07) (17 - 18)  SpO2: 97% (24 Sep 2020 14:07) (97% - 98%)    CONSTITUTIONAL: NAD, well-developed  RESPIRATORY: Normal respiratory effort; lungs are clear to auscultation bilaterally  CARDIOVASCULAR: Regular rate and rhythm, normal S1 and S2, no murmur/rub/gallop; No lower extremity edema; Peripheral pulses are 2+ bilaterally  ABDOMEN: Nontender to palpation, normoactive bowel sounds, no rebound/guarding; No hepatosplenomegaly  MUSCULOSKELETAL: no clubbing or cyanosis of digits; no joint swelling or tenderness to palpation  PSYCH: A+O to person, place, and time; affect appropriate    LABS:                        18.6   7.03  )-----------( 238      ( 24 Sep 2020 07:10 )             60.7     09-24    133<L>  |  93<L>  |  28<H>  ----------------------------<  70  4.6   |  21<L>  |  1.30    Ca    10.3      24 Sep 2020 07:10  Phos  3.8     09-22  Mg     2.1     09-24    TPro  8.1  /  Alb  4.3  /  TBili  1.2  /  DBili  x   /  AST  39  /  ALT  28  /  AlkPhos  160<H>  09-23    PT/INR - ( 22 Sep 2020 16:30 )   PT: 13.4 SEC;   INR: 1.18          PTT - ( 22 Sep 2020 16:30 )  PTT:39.1 SEC            RADIOLOGY & ADDITIONAL TESTS:  Results Reviewed:   Imaging Personally Reviewed:  Electrocardiogram Personally Reviewed:    COORDINATION OF CARE:  Care Discussed with Consultants/Other Providers [Y/N]:  Prior or Outpatient Records Reviewed [Y/N]:

## 2020-09-24 NOTE — PROVIDER CONTACT NOTE (CRITICAL VALUE NOTIFICATION) - RECOMMENDATIONS
YUKI Vick notified of Hgb 18.6, Hct 60.7. YUKI Vick notified of Hgb 18.6, Hct 60.7. As per YUKI Vick continue to monitor H&H trend. Hematology may be consulted as per YUKI Vick.

## 2020-09-24 NOTE — CONSULT NOTE ADULT - ASSESSMENT
34yo Maltese speaking M w/ HTN, HLD, DM, heart failure, non ischemic dilated cardiomyopathy per cardiac CT who presents from [facility] for AICD placement. Hematology consulted for evaluation of polycythemia and clearance prior to AICD placement.     Polycythemia  - labs from [facility] reviewed; showed Hb/Hct 15/47.1 on 09/15 and 15.4/48.2 on 09/16  - labs here at [facility] Hb/Hct 18.6/60.7  - does not have any sequelae of polycythemia that would warrant immediate intervention (blurry vision, headache, chest pain, etc.)  - may be primary vs secondary polycythemia (risk factors obesity with BMI 38)    - f/u EPO and JAK2 sent by primary team to evaluate for polycythemia vera    - if the patient were to have polycythemia vera, there is increased risk of thrombosis     is increased risk of thrombosis with polycythemia   -     Keith Mcintyre MD  Hematology/Oncology Fellow 36yo Swedish speaking M w/ HTN, HLD, DM, heart failure, non ischemic dilated cardiomyopathy per cardiac CT who presents from [facility] for AICD placement. Hematology consulted for evaluation of polycythemia and clearance prior to AICD placement.     Polycythemia  - labs from [facility] reviewed; showed Hb/Hct 15/47.1 on 09/15 and 15.4/48.2 on 09/16  - labs here at [facility] Hb/Hct 18.6/60.7  - does not have any sequelae of polycythemia that would warrant immediate intervention (blurry vision, headache, chest pain, etc.)  - may be primary vs secondary polycythemia (risk factors obesity with BMI 38)    - given that patient had normal H/H on labs from [facility], ***    - f/u EPO and JAK2 sent by primary team to evaluate for polycythemia vera    - if the patient were to have polycythemia vera, there is increased risk of thrombosis especially with Hct >45. Would avoid     Keith Mcintyre MD  Hematology/Oncology Fellow 34yo Moh[place] speaking M w/ HTN, HLD, DM, heart failure, non ischemic dilated cardiomyopathy per cardiac CT who presents from [facility] for AICD placement. Hematology consulted for evaluation of erythrocytosis and clearance prior to AICD placement.     Erythrocytosis  - labs from [facility] reviewed; showed Hb/Hct 15/47.1 on 09/15 and 15.4/48.2 on 09/16  - labs here at [facility] Hb/Hct 18.6/60.7  - does not have any sequelae of erythrocytosis that would warrant immediate intervention (blurry vision, headache, chest pain, etc.)  - may be primary vs secondary erythrocytosis (risk factors obesity with BMI 38)  - f/u EPO and JAK2 sent by primary team to evaluate for polycythemia vera  - would hold off on any surgical procedure for now until we determine etiology  - please trend CBC daily   - if the patient were to have polycythemia vera, there is increased risk of thrombosis especially with Hct >45. May warrant phlebotomy if determined to have PV    Keith Mcintyre MD  Hematology/Oncology Fellow

## 2020-09-24 NOTE — PROGRESS NOTE ADULT - ASSESSMENT
This is a 35 year old man with a pmhx of HFrEF (30-35%), NICM, HTN, T2DM, and HLD who present to an OSH for nausea and emesis for 4 days and course was c/b HTN emergency in the setting of HFrEF from medication noncompliant and increased his hydralazine medication to 75mg po q8h. Patient underwent an ischemic evaluation and cardiac computed tomography angiography was negative per OSH record and TTE revealed worsening EF (<15%). Patient was transferred to [facility] for a AICD for a AICD for primary prevention given that the patient has a low EF with a NYHA III despite more than 3months of goal directive medical therapy. Patient was consented with a  ([id number]). The risk and benefits for each procedure were explain in detail which included but not limited to bleeding, infection, stroke, cardiac tamponade and death. Patient expressed understanding an all questions were answered. Awaiting Hematology recommendation prior to AICD placement     Plan:  HFrEF (<15%) plan for AICD on 9/23/2020  - Continuous telemetric monitor for Afib  - Replete electrolytes for K<4 and Mg<2  - Covid-19 negative on 9/19/2020 from OSH and on 9/22/2020  - Patient was consent for a AICD for primary preventative for a low EF with a NYHA III despite 3months of goal directive medical therapy . The risk and benefits for each procedure were explain in detail which included but not limited to bleeding, infection, stroke, cardiac tamponade and death. Patient expressed understanding an all questions were answered  - Consult hematology for elevated HCT. Will postponed AICD placement until hematology weights in given increase risk for thrombosis   - Post-op PPM instruction has been verbal explain and given to the patient using a  [id number]      No scrubbing the incision site for 2 weeks      No lotion, ointment, powder or direct sunlight to the incision site for 2 weeks       No lifting more than 5lb or exertional exercising such as jogging, running, bike riding for 6-8 weeks      Do not get the surgical incision wet for 1 week      No swimming pool, Jacuzzi, or bath for 6-8 weeks. Allow the water to run over the dressing after 1 week      Pt was instructed to call [phone] if the following occurs:         - fever with temperature > 100.6         - swelling, drainage or bleeding at the site incision     Mika Carvalho PA-C  Patient to be staff with attending. Please awaiting attending addendum This is a 35 year old man with a pmhx of HFrEF (30-35%), NICM, HTN, T2DM, and HLD who present to an OSH for nausea and emesis for 4 days and course was c/b HTN emergency in the setting of HFrEF from medication noncompliant and increased his hydralazine medication to 75mg po q8h. Patient underwent an ischemic evaluation and cardiac computed tomography angiography was negative per OSH record and TTE revealed worsening EF (<15%). Patient was transferred to [facility] for a AICD for a AICD for primary prevention given that the patient has a low EF with a NYHA III despite more than 3months of goal directive medical therapy. Patient was consented with a  ([id number]). The risk and benefits for each procedure were explain in detail which included but not limited to bleeding, infection, stroke, cardiac tamponade and death. Patient expressed understanding an all questions were answered. Awaiting Hematology recommendation prior to AICD placement     Plan:  HFrEF (<15%) plan for AICD on 9/23/2020  - Continuous telemetric monitor for Afib  - Replete electrolytes for K<4 and Mg<2  - Covid-19 negative on 9/19/2020 from OSH and on 9/22/2020  - Patient was consent for a AICD for primary preventative for a low EF with a NYHA III despite 3months of goal directive medical therapy . The risk and benefits for each procedure were explain in detail which included but not limited to bleeding, infection, stroke, cardiac tamponade and death. Patient expressed understanding an all questions were answered  - Consult hematology for elevated Hgb/HCT. Will postponed AICD placement until hematology weights in given increase risk for thrombosis   - Post-op PPM instruction has been verbal explain and given to the patient using a  [id number]      No scrubbing the incision site for 2 weeks      No lotion, ointment, powder or direct sunlight to the incision site for 2 weeks       No lifting more than 5lb or exertional exercising such as jogging, running, bike riding for 6-8 weeks      Do not get the surgical incision wet for 1 week      No swimming pool, Jacuzzi, or bath for 6-8 weeks. Allow the water to run over the dressing after 1 week      Pt was instructed to call [phone] if the following occurs:         - fever with temperature > 100.6         - swelling, drainage or bleeding at the site incision     Mika Carvalho PA-C  Patient to be staff with attending. Please awaiting attending addendum

## 2020-09-24 NOTE — PROVIDER CONTACT NOTE (OTHER) - SITUATION
Nursing Note by Alicia Nath at 05/23/18 10:12 AM     Author:  Alicia Nath Service:  (none) Author Type:  Medical Records Staff     Filed:  05/23/18 10:12 AM Encounter Date:  5/22/2018 Status:  Signed     :  Alicia Nath (Medical Records Staff)            Date Form Received by Disability:[KK1.1T] 5/ 22/ 2018[KK1.1M]   Authorization?[KK1.1T] yes[KK1.1M]   Date sent for auth:    Date auth returned:    Charge:[KK1.1T] no[KK1.1M]    Type of form:[KK1.1T] Lic. Placard/Medical report[KK1.1M]    Company:[KK1.1T] [employer] STATE[KK1.1M]   When form complete:[KK1.1T] Fax  [place][KK1.1M]   Comments[KK1.1T]          Revision History        User Key Date/Time User Provider Type Action    > KK1.1 05/23/18 10:12 AM Alicia Nath Medical Records Staff Sign    M - Manual, T - Template             Finger Stick of 45

## 2020-09-24 NOTE — PROGRESS NOTE ADULT - ASSESSMENT
35 year old male with hx of HTN, HLD, DM, heart failure, non ischemic dilated cardiomyopathy per cardiac CT was admitted at [facility] for HFrEF exacerbation due to medication non-compliance. Patient was sent from [facility] for AICD placement; procedure was cancelled by EP due to concern for erythrocytosis.

## 2020-09-25 LAB
ANION GAP SERPL CALC-SCNC: 15 MMO/L — HIGH (ref 7–14)
BASOPHILS # BLD AUTO: 0.09 K/UL — SIGNIFICANT CHANGE UP (ref 0–0.2)
BASOPHILS NFR BLD AUTO: 1.2 % — SIGNIFICANT CHANGE UP (ref 0–2)
BUN SERPL-MCNC: 28 MG/DL — HIGH (ref 7–23)
CALCIUM SERPL-MCNC: 10.4 MG/DL — SIGNIFICANT CHANGE UP (ref 8.4–10.5)
CHLORIDE SERPL-SCNC: 95 MMOL/L — LOW (ref 98–107)
CO2 SERPL-SCNC: 25 MMOL/L — SIGNIFICANT CHANGE UP (ref 22–31)
CREAT SERPL-MCNC: 1.37 MG/DL — HIGH (ref 0.5–1.3)
CULTURE RESULTS: SIGNIFICANT CHANGE UP
CULTURE RESULTS: SIGNIFICANT CHANGE UP
EOSINOPHIL # BLD AUTO: 0.16 K/UL — SIGNIFICANT CHANGE UP (ref 0–0.5)
EOSINOPHIL NFR BLD AUTO: 2.2 % — SIGNIFICANT CHANGE UP (ref 0–6)
EPO SERPL-MCNC: 3.5 MIU/ML — SIGNIFICANT CHANGE UP (ref 2.6–18.5)
GLUCOSE BLDC GLUCOMTR-MCNC: 132 MG/DL — HIGH (ref 70–99)
GLUCOSE BLDC GLUCOMTR-MCNC: 87 MG/DL — SIGNIFICANT CHANGE UP (ref 70–99)
GLUCOSE BLDC GLUCOMTR-MCNC: 93 MG/DL — SIGNIFICANT CHANGE UP (ref 70–99)
GLUCOSE BLDC GLUCOMTR-MCNC: 94 MG/DL — SIGNIFICANT CHANGE UP (ref 70–99)
GLUCOSE SERPL-MCNC: 119 MG/DL — HIGH (ref 70–99)
HCT VFR BLD CALC: 59.1 % — CRITICAL HIGH (ref 39–50)
HGB BLD-MCNC: 18.3 G/DL — HIGH (ref 13–17)
IMM GRANULOCYTES NFR BLD AUTO: 0.3 % — SIGNIFICANT CHANGE UP (ref 0–1.5)
LYMPHOCYTES # BLD AUTO: 1.67 K/UL — SIGNIFICANT CHANGE UP (ref 1–3.3)
LYMPHOCYTES # BLD AUTO: 23.1 % — SIGNIFICANT CHANGE UP (ref 13–44)
MAGNESIUM SERPL-MCNC: 2 MG/DL — SIGNIFICANT CHANGE UP (ref 1.6–2.6)
MCHC RBC-ENTMCNC: 25 PG — LOW (ref 27–34)
MCHC RBC-ENTMCNC: 31 % — LOW (ref 32–36)
MCV RBC AUTO: 80.7 FL — SIGNIFICANT CHANGE UP (ref 80–100)
MONOCYTES # BLD AUTO: 0.69 K/UL — SIGNIFICANT CHANGE UP (ref 0–0.9)
MONOCYTES NFR BLD AUTO: 9.6 % — SIGNIFICANT CHANGE UP (ref 2–14)
NEUTROPHILS # BLD AUTO: 4.59 K/UL — SIGNIFICANT CHANGE UP (ref 1.8–7.4)
NEUTROPHILS NFR BLD AUTO: 63.6 % — SIGNIFICANT CHANGE UP (ref 43–77)
NRBC # FLD: 0 K/UL — SIGNIFICANT CHANGE UP (ref 0–0)
PHOSPHATE SERPL-MCNC: 3.9 MG/DL — SIGNIFICANT CHANGE UP (ref 2.5–4.5)
PLATELET # BLD AUTO: 254 K/UL — SIGNIFICANT CHANGE UP (ref 150–400)
PMV BLD: 11.5 FL — SIGNIFICANT CHANGE UP (ref 7–13)
POTASSIUM SERPL-MCNC: 4.2 MMOL/L — SIGNIFICANT CHANGE UP (ref 3.5–5.3)
POTASSIUM SERPL-SCNC: 4.2 MMOL/L — SIGNIFICANT CHANGE UP (ref 3.5–5.3)
RBC # BLD: 7.32 M/UL — HIGH (ref 4.2–5.8)
RBC # FLD: 17.2 % — HIGH (ref 10.3–14.5)
SODIUM SERPL-SCNC: 135 MMOL/L — SIGNIFICANT CHANGE UP (ref 135–145)
SPECIMEN SOURCE: SIGNIFICANT CHANGE UP
SPECIMEN SOURCE: SIGNIFICANT CHANGE UP
WBC # BLD: 7.22 K/UL — SIGNIFICANT CHANGE UP (ref 3.8–10.5)
WBC # FLD AUTO: 7.22 K/UL — SIGNIFICANT CHANGE UP (ref 3.8–10.5)

## 2020-09-25 PROCEDURE — 99233 SBSQ HOSP IP/OBS HIGH 50: CPT

## 2020-09-25 PROCEDURE — 99231 SBSQ HOSP IP/OBS SF/LOW 25: CPT

## 2020-09-25 RX ADMIN — ISOSORBIDE MONONITRATE 30 MILLIGRAM(S): 60 TABLET, EXTENDED RELEASE ORAL at 12:47

## 2020-09-25 RX ADMIN — Medication 100 MILLIGRAM(S): at 12:48

## 2020-09-25 RX ADMIN — ATORVASTATIN CALCIUM 40 MILLIGRAM(S): 80 TABLET, FILM COATED ORAL at 23:38

## 2020-09-25 RX ADMIN — Medication 40 MILLIGRAM(S): at 06:06

## 2020-09-25 RX ADMIN — LISINOPRIL 40 MILLIGRAM(S): 2.5 TABLET ORAL at 06:06

## 2020-09-25 RX ADMIN — Medication 75 MILLIGRAM(S): at 14:35

## 2020-09-25 RX ADMIN — Medication 50 MILLIGRAM(S): at 06:06

## 2020-09-25 RX ADMIN — PANTOPRAZOLE SODIUM 40 MILLIGRAM(S): 20 TABLET, DELAYED RELEASE ORAL at 06:06

## 2020-09-25 RX ADMIN — Medication 1 MILLIGRAM(S): at 12:48

## 2020-09-25 RX ADMIN — Medication 75 MILLIGRAM(S): at 06:06

## 2020-09-25 RX ADMIN — Medication 81 MILLIGRAM(S): at 12:47

## 2020-09-25 RX ADMIN — Medication 500 MILLIGRAM(S): at 12:48

## 2020-09-25 RX ADMIN — Medication 75 MILLIGRAM(S): at 23:38

## 2020-09-25 NOTE — PHARMACOTHERAPY INTERVENTION NOTE - COMMENTS
Discussed medication dose, administration, side effects, and monitoring.  Reviewed checking daily weights, and sodium and fluid limits.  Patient demonstrated understanding.       Titus Marie, PharmD     Clinical Pharmacist- Internal Medicine     Spectra [phone] Patient education through  ID# [id number].    Discussed medication dose, administration, side effects, and monitoring.  Reviewed checking daily weights, and sodium and fluid limits.  Patient demonstrated understanding.       Titus Marie, PharmD     Clinical Pharmacist- Internal Medicine     Spectra [phone]

## 2020-09-25 NOTE — PROGRESS NOTE ADULT - SUBJECTIVE AND OBJECTIVE BOX
Patient is a 35y old  Male who presents with a chief complaint of AICD placement (24 Sep 2020 15:11)  Luxembourgish speaking ID: [id number]  Patient denies CP, SOB or palpitations.  Patient reports "I was off medication for one year dur to lack of insurance".    PAST MEDICAL & SURGICAL HISTORY:  Heart failure with reduced ejection fraction    DM (diabetes mellitus)    HLD (hyperlipidemia)    HTN (hypertension)        MEDICATIONS  (STANDING):  ascorbic acid 500 milliGRAM(s) Oral daily  aspirin  chewable 81 milliGRAM(s) Oral daily  atorvastatin 40 milliGRAM(s) Oral at bedtime  dextrose 5%. 1000 milliLiter(s) (50 mL/Hr) IV Continuous <Continuous>  dextrose 50% Injectable 12.5 Gram(s) IV Push once  dextrose 50% Injectable 25 Gram(s) IV Push once  dextrose 50% Injectable 25 Gram(s) IV Push once  folic acid 1 milliGRAM(s) Oral daily  furosemide    Tablet 40 milliGRAM(s) Oral daily  hydrALAZINE 75 milliGRAM(s) Oral every 8 hours  insulin lispro (HumaLOG) corrective regimen sliding scale   SubCutaneous three times a day before meals  insulin lispro (HumaLOG) corrective regimen sliding scale   SubCutaneous at bedtime  isosorbide   mononitrate ER Tablet (IMDUR) 30 milliGRAM(s) Oral daily  lisinopril 40 milliGRAM(s) Oral daily  metoprolol succinate ER 50 milliGRAM(s) Oral daily  pantoprazole    Tablet 40 milliGRAM(s) Oral before breakfast  thiamine 100 milliGRAM(s) Oral daily    MEDICATIONS  (PRN):  acetaminophen   Tablet .. 650 milliGRAM(s) Oral every 6 hours PRN Temp greater or equal to 38C (100.4F), Mild Pain (1 - 3)  dextrose 40% Gel 15 Gram(s) Oral once PRN Blood Glucose LESS THAN 70 milliGRAM(s)/deciliter  glucagon  Injectable 1 milliGRAM(s) IntraMuscular once PRN Glucose LESS THAN 70 milligrams/deciliter            Vital Signs Last 24 Hrs  T(C): 36.9 (25 Sep 2020 05:28), Max: 37.1 (24 Sep 2020 14:07)  T(F): 98.4 (25 Sep 2020 05:28), Max: 98.8 (24 Sep 2020 14:07)  HR: 81 (25 Sep 2020 05:28) (75 - 85)  BP: 139/86 (25 Sep 2020 05:28) (108/70 - 139/86)  BP(mean): --  RR: 18 (25 Sep 2020 05:28) (17 - 18)  SpO2: 100% (25 Sep 2020 05:28) (97% - 100%)            INTERPRETATION OF TELEMETRY: SR 80 with occasional pause <1.5 seconds; no NSVT or VT seen    ECG:        LABS:                        18.3   7.22  )-----------( 254      ( 25 Sep 2020 11:03 )             59.1     09-25    135  |  95<L>  |  28<H>  ----------------------------<  119<H>  4.2   |  25  |  1.37<H>    Ca    10.4      25 Sep 2020 11:03  Phos  3.9     09-25  Mg     2.0     09-25                BNP  RADIOLOGY & ADDITIONAL STUDIES:        PHYSICAL EXAM:    GENERAL: In no apparent distress, well nourished, and hydrated.  NECK: Supple and normal thyroid.  No JVD or carotid bruit.  Carotid pulse is 2+ bilaterally.  HEART: Regular rate and rhythm; No murmurs, rubs, or gallops.  PULMONARY: Clear to auscultation and perfusion.  No rales, wheezing, or rhonchi bilaterally.  ABDOMEN: Soft, Nontender, Nondistended; Bowel sounds present  EXTREMITIES:  2+ Peripheral Pulses, No clubbing, cyanosis, or edema  NEUROLOGICAL: Grossly nonfocal

## 2020-09-25 NOTE — PROGRESS NOTE ADULT - ASSESSMENT
35 year old male with hx of HTN, HLD, DM, heart failure, non ischemic dilated cardiomyopathy per cardiac CT was admitted at [facility] for HFrEF exacerbation due to medication non-compliance. Patient was sent from [facility] for AICD placement; procedure was cancelled by EP due to concern for erythrocytosis in setting of primary vs secondary polycythemia. Epo level on lower end of normal; per heme, now awaiting ARMANDO 2.

## 2020-09-25 NOTE — PROGRESS NOTE ADULT - SUBJECTIVE AND OBJECTIVE BOX
PROGRESS NOTE:   Authored by Dr. Porsche Aguiar MD, pager [phone]     Patient is a 35y old  Male who presents with a chief complaint of AICD placement (25 Sep 2020 12:10)    Pt interviewed in [place]; no  services needed as provider is native speaker.     SUBJECTIVE / OVERNIGHT EVENTS:  No acute events ON. Patient denies CP or SOB.     ADDITIONAL REVIEW OF SYSTEMS:  REVIEW OF SYSTEMS:    CONSTITUTIONAL: No weakness, fevers or chills  EYES/ENT: No visual changes;  No vertigo or throat pain   NECK: No pain or stiffness  RESPIRATORY: No cough, wheezing, hemoptysis; No shortness of breath  CARDIOVASCULAR: No chest pain or palpitations  GASTROINTESTINAL: No abdominal or epigastric pain. No nausea, vomiting, or hematemesis; No diarrhea or constipation. No melena or hematochezia.  GENITOURINARY: No dysuria, frequency or hematuria  NEUROLOGICAL: No numbness or weakness  PSYCH: No A/V hallucinations  MSK: No joint pain  SKIN: No itching, rashes    MEDICATIONS  (STANDING):  ascorbic acid 500 milliGRAM(s) Oral daily  aspirin  chewable 81 milliGRAM(s) Oral daily  atorvastatin 40 milliGRAM(s) Oral at bedtime  dextrose 5%. 1000 milliLiter(s) (50 mL/Hr) IV Continuous <Continuous>  dextrose 50% Injectable 12.5 Gram(s) IV Push once  dextrose 50% Injectable 25 Gram(s) IV Push once  dextrose 50% Injectable 25 Gram(s) IV Push once  folic acid 1 milliGRAM(s) Oral daily  furosemide    Tablet 40 milliGRAM(s) Oral daily  hydrALAZINE 75 milliGRAM(s) Oral every 8 hours  insulin lispro (HumaLOG) corrective regimen sliding scale   SubCutaneous three times a day before meals  insulin lispro (HumaLOG) corrective regimen sliding scale   SubCutaneous at bedtime  isosorbide   mononitrate ER Tablet (IMDUR) 30 milliGRAM(s) Oral daily  lisinopril 40 milliGRAM(s) Oral daily  metoprolol succinate ER 50 milliGRAM(s) Oral daily  pantoprazole    Tablet 40 milliGRAM(s) Oral before breakfast  thiamine 100 milliGRAM(s) Oral daily    MEDICATIONS  (PRN):  acetaminophen   Tablet .. 650 milliGRAM(s) Oral every 6 hours PRN Temp greater or equal to 38C (100.4F), Mild Pain (1 - 3)  dextrose 40% Gel 15 Gram(s) Oral once PRN Blood Glucose LESS THAN 70 milliGRAM(s)/deciliter  glucagon  Injectable 1 milliGRAM(s) IntraMuscular once PRN Glucose LESS THAN 70 milligrams/deciliter      CAPILLARY BLOOD GLUCOSE      POCT Blood Glucose.: 132 mg/dL (25 Sep 2020 12:28)  POCT Blood Glucose.: 87 mg/dL (25 Sep 2020 08:43)  POCT Blood Glucose.: 101 mg/dL (24 Sep 2020 21:38)  POCT Blood Glucose.: 85 mg/dL (24 Sep 2020 17:38)    I&O's Summary    24 Sep 2020 07:01  -  25 Sep 2020 07:00  --------------------------------------------------------  IN: 600 mL / OUT: 200 mL / NET: 400 mL        PHYSICAL EXAM:  Vital Signs Last 24 Hrs  T(C): 36.4 (25 Sep 2020 12:44), Max: 36.9 (25 Sep 2020 05:28)  T(F): 97.5 (25 Sep 2020 12:44), Max: 98.4 (25 Sep 2020 05:28)  HR: 71 (25 Sep 2020 12:44) (71 - 85)  BP: 108/81 (25 Sep 2020 12:44) (108/81 - 139/86)  BP(mean): --  RR: 16 (25 Sep 2020 12:44) (16 - 18)  SpO2: 95% (25 Sep 2020 12:44) (95% - 100%)    CONSTITUTIONAL: NAD, well-developed  ENT: MMM, hearing intact  RESPIRATORY: Normal respiratory effort; lungs are clear to auscultation bilaterally  CARDIOVASCULAR: Regular rate and rhythm, normal S1 and S2, no murmur/rub/gallop; No lower extremity edema; Peripheral pulses are 2+ bilaterally  ABDOMEN: Obese. Nontender to palpation, normoactive bowel sounds, no rebound/guarding; No hepatosplenomegaly  MUSCULOSKELETAL: no clubbing or cyanosis of digits; no joint swelling or tenderness to palpation  PSYCH: A+O to person, place, and time; affect appropriate    LABS:                        18.3   7.22  )-----------( 254      ( 25 Sep 2020 11:03 )             59.1     09-25    135  |  95<L>  |  28<H>  ----------------------------<  119<H>  4.2   |  25  |  1.37<H>    Ca    10.4      25 Sep 2020 11:03  Phos  3.9     09-25  Mg     2.0     09-25                  RADIOLOGY & ADDITIONAL TESTS:  Results Reviewed:   Imaging Personally Reviewed:  Electrocardiogram Personally Reviewed:    COORDINATION OF CARE:  Care Discussed with Consultants/Other Providers [Y/N]:  Prior or Outpatient Records Reviewed [Y/N]:

## 2020-09-25 NOTE — PROGRESS NOTE ADULT - ASSESSMENT
This is a 35 year old man with a pmhx of chronic HFrEF (30-35%), NICM, HTN, T2DM, and HLD who present to an OSH for nausea and emesis for 4 days and course was c/b HTN emergency in the setting of HFrEF from medication noncompliant and increased his hydralazine medication to 75mg po q8h. Patient underwent an ischemic evaluation and cardiac computed tomography angiography was negative per OSH record and TTE revealed worsening EF (<15%). Patient was transferred to [facility] for a AICD for a AICD for primary prevention given that the patient has a low EF with a NYHA III despite more than 3months of goal directive medical therapy. Patient was consented with a  ([id number]). The risk and benefits for each procedure were explain in detail which included but not limited to bleeding, infection, stroke, cardiac tamponade and death. Patient expressed understanding an all questions were answered. Hematology consulted for abnormal H/H prior to AICD placement.      Plan:    - Covid-19 negative on 9/19/2020 from OSH and on 9/22/2020  - -Appreciated hematology's consultation, postponed AICD placement for now   -Will follow up with Hematology, workups for rule out polycythemia vera in progress   -Continue guideline directed medical therapy for NICM, emphasized meds compliance  -Plan for ICD implantation after clearance from [facility]  -Will discuss with Dr. Lu

## 2020-09-25 NOTE — PROGRESS NOTE ADULT - PROBLEM SELECTOR PLAN 2
-patient with erythrocytosis, initially with normal rbc count and hgb on records in paper chart that arrived with patient upon transfer from [facility]  -patient with polycythemia, initially suspected secondary polycythemia given obesity and likely low renal perfusion from low EF; however, epo level is normal, raising concern for PV. ARMANDO 2 already sent. Repeat epo level sent.   -hematology following

## 2020-09-26 DIAGNOSIS — R79.89 OTHER SPECIFIED ABNORMAL FINDINGS OF BLOOD CHEMISTRY: ICD-10-CM

## 2020-09-26 LAB
ANION GAP SERPL CALC-SCNC: 17 MMO/L — HIGH (ref 7–14)
BUN SERPL-MCNC: 33 MG/DL — HIGH (ref 7–23)
CALCIUM SERPL-MCNC: 9.8 MG/DL — SIGNIFICANT CHANGE UP (ref 8.4–10.5)
CHLORIDE SERPL-SCNC: 96 MMOL/L — LOW (ref 98–107)
CO2 SERPL-SCNC: 23 MMOL/L — SIGNIFICANT CHANGE UP (ref 22–31)
CREAT SERPL-MCNC: 1.4 MG/DL — HIGH (ref 0.5–1.3)
CULTURE RESULTS: SIGNIFICANT CHANGE UP
EPO SERPL-MCNC: 3.5 MIU/ML — SIGNIFICANT CHANGE UP (ref 2.6–18.5)
GLUCOSE BLDC GLUCOMTR-MCNC: 137 MG/DL — HIGH (ref 70–99)
GLUCOSE BLDC GLUCOMTR-MCNC: 78 MG/DL — SIGNIFICANT CHANGE UP (ref 70–99)
GLUCOSE BLDC GLUCOMTR-MCNC: 92 MG/DL — SIGNIFICANT CHANGE UP (ref 70–99)
GLUCOSE BLDC GLUCOMTR-MCNC: 95 MG/DL — SIGNIFICANT CHANGE UP (ref 70–99)
GLUCOSE SERPL-MCNC: 71 MG/DL — SIGNIFICANT CHANGE UP (ref 70–99)
HCT VFR BLD CALC: 57 % — CRITICAL HIGH (ref 39–50)
HGB BLD-MCNC: 17.5 G/DL — HIGH (ref 13–17)
MCHC RBC-ENTMCNC: 24.9 PG — LOW (ref 27–34)
MCHC RBC-ENTMCNC: 30.7 % — LOW (ref 32–36)
MCV RBC AUTO: 81 FL — SIGNIFICANT CHANGE UP (ref 80–100)
NRBC # FLD: 0 K/UL — SIGNIFICANT CHANGE UP (ref 0–0)
PLATELET # BLD AUTO: 221 K/UL — SIGNIFICANT CHANGE UP (ref 150–400)
PMV BLD: 11.6 FL — SIGNIFICANT CHANGE UP (ref 7–13)
POTASSIUM SERPL-MCNC: 3.8 MMOL/L — SIGNIFICANT CHANGE UP (ref 3.5–5.3)
POTASSIUM SERPL-SCNC: 3.8 MMOL/L — SIGNIFICANT CHANGE UP (ref 3.5–5.3)
RBC # BLD: 7.04 M/UL — HIGH (ref 4.2–5.8)
RBC # FLD: 16.9 % — HIGH (ref 10.3–14.5)
SODIUM SERPL-SCNC: 136 MMOL/L — SIGNIFICANT CHANGE UP (ref 135–145)
SPECIMEN SOURCE: SIGNIFICANT CHANGE UP
WBC # BLD: 6.87 K/UL — SIGNIFICANT CHANGE UP (ref 3.8–10.5)
WBC # FLD AUTO: 6.87 K/UL — SIGNIFICANT CHANGE UP (ref 3.8–10.5)

## 2020-09-26 PROCEDURE — 99233 SBSQ HOSP IP/OBS HIGH 50: CPT

## 2020-09-26 PROCEDURE — 93010 ELECTROCARDIOGRAM REPORT: CPT

## 2020-09-26 PROCEDURE — 99232 SBSQ HOSP IP/OBS MODERATE 35: CPT | Mod: GC

## 2020-09-26 RX ORDER — FUROSEMIDE 40 MG
40 TABLET ORAL
Refills: 0 | Status: DISCONTINUED | OUTPATIENT
Start: 2020-09-26 | End: 2020-09-30

## 2020-09-26 RX ADMIN — Medication 100 MILLIGRAM(S): at 12:12

## 2020-09-26 RX ADMIN — Medication 40 MILLIGRAM(S): at 18:17

## 2020-09-26 RX ADMIN — ATORVASTATIN CALCIUM 40 MILLIGRAM(S): 80 TABLET, FILM COATED ORAL at 23:05

## 2020-09-26 RX ADMIN — Medication 81 MILLIGRAM(S): at 12:11

## 2020-09-26 RX ADMIN — Medication 500 MILLIGRAM(S): at 12:11

## 2020-09-26 RX ADMIN — LISINOPRIL 40 MILLIGRAM(S): 2.5 TABLET ORAL at 12:11

## 2020-09-26 RX ADMIN — Medication 1 MILLIGRAM(S): at 12:11

## 2020-09-26 RX ADMIN — Medication 75 MILLIGRAM(S): at 23:09

## 2020-09-26 RX ADMIN — Medication 75 MILLIGRAM(S): at 14:33

## 2020-09-26 RX ADMIN — Medication 50 MILLIGRAM(S): at 07:00

## 2020-09-26 RX ADMIN — Medication 40 MILLIGRAM(S): at 06:59

## 2020-09-26 RX ADMIN — PANTOPRAZOLE SODIUM 40 MILLIGRAM(S): 20 TABLET, DELAYED RELEASE ORAL at 06:59

## 2020-09-26 RX ADMIN — ISOSORBIDE MONONITRATE 30 MILLIGRAM(S): 60 TABLET, EXTENDED RELEASE ORAL at 12:11

## 2020-09-26 NOTE — PROGRESS NOTE ADULT - PROBLEM SELECTOR PLAN 2
-patient with polycythemia, initially suspected secondary polycythemia given obesity and likely low renal perfusion from low EF; however, epo level is normal x2, raising concern for PV. Pt currently w/ no signs or symptoms/sequela of hyperviscosity   -ARMANDO 2 already sent and results are pending  -hematology following

## 2020-09-26 NOTE — PROGRESS NOTE ADULT - ASSESSMENT
35 year old male with hx of HTN, HLD, DM, HFref 2/2  non ischemic dilated cardiomyopathy was admitted at [facility] for HFrEF exacerbation due to medication non-compliance. Now transferred from [facility] for AICD placement. However, procedure on hold due to concern for erythrocytosis in setting of primary vs secondary polycythemia. Epo level on lower end of normal; Per heme, now awaiting ARMANDO 2.

## 2020-09-26 NOTE — PROGRESS NOTE ADULT - SUBJECTIVE AND OBJECTIVE BOX
Patient seen and examined at bedside.    Overnight Events: No acute events overnight.       REVIEW OF SYSTEMS:  Constitutional:     [ ] negative [ ] fevers [ ] chills [ ] weight loss [ ] weight gain  HEENT:                  [ ] negative [ ] dry eyes [ ] eye irritation [ ] postnasal drip [ ] nasal congestion  CV:                         [ ] negative  [ ] chest pain [ ] orthopnea [ ] palpitations [ ] murmur  Resp:                     [ ] negative [ ] cough [ ] shortness of breath [ ] dyspnea [ ] wheezing [ ] sputum [ ]hemoptysis  GI:                          [ ] negative [ ] nausea [ ] vomiting [ ] diarrhea [ ] constipation [ ] abd pain [ ] dysphagia   :                        [ ] negative [ ] dysuria [ ] nocturia [ ] hematuria [ ] increased urinary frequency  Musculoskeletal: [ ] negative [ ] back pain [ ] myalgias [ ] arthralgias [ ] fracture  Skin:                       [ ] negative [ ] rash [ ] itch  Neurological:        [ ] negative [ ] headache [ ] dizziness [ ] syncope [ ] weakness [ ] numbness  Psychiatric:           [ ] negative [ ] anxiety [ ] depression  Endocrine:            [ ] negative [ ] diabetes [ ] thyroid problem  Heme/Lymph:      [ ] negative [ ] anemia [ ] bleeding problem  Allergic/Immune: [ ] negative [ ] itchy eyes [ ] nasal discharge [ ] hives [ ] angioedema    [x ] All other systems negative  [ ] Unable to assess ROS due to    Current Meds:  acetaminophen   Tablet .. 650 milliGRAM(s) Oral every 6 hours PRN  ascorbic acid 500 milliGRAM(s) Oral daily  aspirin  chewable 81 milliGRAM(s) Oral daily  atorvastatin 40 milliGRAM(s) Oral at bedtime  dextrose 40% Gel 15 Gram(s) Oral once PRN  dextrose 5%. 1000 milliLiter(s) IV Continuous <Continuous>  dextrose 50% Injectable 12.5 Gram(s) IV Push once  dextrose 50% Injectable 25 Gram(s) IV Push once  dextrose 50% Injectable 25 Gram(s) IV Push once  folic acid 1 milliGRAM(s) Oral daily  furosemide    Tablet 40 milliGRAM(s) Oral daily  glucagon  Injectable 1 milliGRAM(s) IntraMuscular once PRN  hydrALAZINE 75 milliGRAM(s) Oral every 8 hours  insulin lispro (HumaLOG) corrective regimen sliding scale   SubCutaneous three times a day before meals  insulin lispro (HumaLOG) corrective regimen sliding scale   SubCutaneous at bedtime  isosorbide   mononitrate ER Tablet (IMDUR) 30 milliGRAM(s) Oral daily  lisinopril 40 milliGRAM(s) Oral daily  metoprolol succinate ER 50 milliGRAM(s) Oral daily  pantoprazole    Tablet 40 milliGRAM(s) Oral before breakfast  thiamine 100 milliGRAM(s) Oral daily      PAST MEDICAL & SURGICAL HISTORY:  Heart failure with reduced ejection fraction    DM (diabetes mellitus)    HLD (hyperlipidemia)    HTN (hypertension)        Vitals:  T(F): 98 (09-26), Max: 98 (09-25)  HR: 73 (09-26) (62 - 88)  BP: 104/75 (09-26) (100/72 - 116/68)  RR: 17 (09-26)  SpO2: 98% (09-26)  I&O's Summary    25 Sep 2020 07:01  -  26 Sep 2020 07:00  --------------------------------------------------------  IN: 330 mL / OUT: 550 mL / NET: -220 mL        Physical Exam:  Appearance: No acute distress; well appearing  Eyes: PERRL, EOMI, pink conjunctiva  HENT: Normal oral mucosa  Cardiovascular: RRR, S1, S2, no murmurs, rubs, or gallops; no edema; no JVD  Respiratory: Clear to auscultation bilaterally  Gastrointestinal: soft, non-tender, non-distended with normal bowel sounds  Musculoskeletal: No clubbing; no joint deformity   Neurologic: Non-focal  Lymphatic: No lymphadenopathy  Psychiatry: AAOx3, mood & affect appropriate  Skin: No rashes, ecchymoses, or cyanosis                          17.5   6.87  )-----------( 221      ( 26 Sep 2020 06:40 )             57.0     09-26    136  |  96<L>  |  33<H>  ----------------------------<  71  3.8   |  23  |  1.40<H>    Ca    9.8      26 Sep 2020 06:40  Phos  3.9     09-25  Mg     2.0     09-25                    New ECG(s): Personally reviewed      Interpretation of Telemetry: 1 degree AVB. 70s with PACs and PVCs

## 2020-09-26 NOTE — PROVIDER CONTACT NOTE (OTHER) - SITUATION
pt monitored on tele monitor, pt went into prolonged st segment, pt was asymptomatic, VSS. EKG was done, PA was notified.

## 2020-09-26 NOTE — PROVIDER CONTACT NOTE (OTHER) - BACKGROUND
Pt admitted with CHF exacerbation, PMH of HF w/ reduced ejection fraction, DM, HLD, HTN, EF of <15%.

## 2020-09-26 NOTE — PROGRESS NOTE ADULT - SUBJECTIVE AND OBJECTIVE BOX
Patient is a 35y old  Male who presents with a chief complaint of AICD placement (26 Sep 2020 10:02)      SUBJECTIVE / OVERNIGHT EVENTS: Pt denies any general pain or CP. No H/A or changes in vision.     Review of Systems:     MEDICATIONS  (STANDING):  ascorbic acid 500 milliGRAM(s) Oral daily  aspirin  chewable 81 milliGRAM(s) Oral daily  atorvastatin 40 milliGRAM(s) Oral at bedtime  dextrose 5%. 1000 milliLiter(s) (50 mL/Hr) IV Continuous <Continuous>  dextrose 50% Injectable 12.5 Gram(s) IV Push once  dextrose 50% Injectable 25 Gram(s) IV Push once  dextrose 50% Injectable 25 Gram(s) IV Push once  folic acid 1 milliGRAM(s) Oral daily  furosemide    Tablet 40 milliGRAM(s) Oral two times a day  hydrALAZINE 75 milliGRAM(s) Oral every 8 hours  insulin lispro (HumaLOG) corrective regimen sliding scale   SubCutaneous three times a day before meals  insulin lispro (HumaLOG) corrective regimen sliding scale   SubCutaneous at bedtime  isosorbide   mononitrate ER Tablet (IMDUR) 30 milliGRAM(s) Oral daily  lisinopril 40 milliGRAM(s) Oral daily  metoprolol succinate ER 50 milliGRAM(s) Oral daily  pantoprazole    Tablet 40 milliGRAM(s) Oral before breakfast  thiamine 100 milliGRAM(s) Oral daily    MEDICATIONS  (PRN):  acetaminophen   Tablet .. 650 milliGRAM(s) Oral every 6 hours PRN Temp greater or equal to 38C (100.4F), Mild Pain (1 - 3)  dextrose 40% Gel 15 Gram(s) Oral once PRN Blood Glucose LESS THAN 70 milliGRAM(s)/deciliter  glucagon  Injectable 1 milliGRAM(s) IntraMuscular once PRN Glucose LESS THAN 70 milligrams/deciliter      PHYSICAL EXAM:    I&O's Summary    25 Sep 2020 07:01  -  26 Sep 2020 07:00  --------------------------------------------------------  IN: 330 mL / OUT: 550 mL / NET: -220 mL  Vital Signs Last 24 Hrs  T(C): 36.7 (26 Sep 2020 12:09), Max: 36.7 (25 Sep 2020 23:34)  T(F): 98 (26 Sep 2020 12:09), Max: 98 (25 Sep 2020 23:34)  HR: 73 (26 Sep 2020 12:09) (62 - 88)  BP: 116/82 (26 Sep 2020 12:09) (100/72 - 116/82)  BP(mean): --  RR: 18 (26 Sep 2020 12:09) (17 - 19)  SpO2: 98% (26 Sep 2020 12:09) (97% - 98%)    CONSTITUTIONAL: NAD, well-developed  ENT: MMM, hearing intact  RESPIRATORY: Normal respiratory effort; lungs are clear to auscultation bilaterally  CARDIOVASCULAR: Regular rate and rhythm, normal S1 and S2, no murmur/rub/gallop; No lower extremity edema; Peripheral pulses are 2+ bilaterally  ABDOMEN: Obese. Nontender to palpation, normoactive bowel sounds, no rebound/guarding; No hepatosplenomegaly  MUSCULOSKELETAL: no clubbing or cyanosis of digits; no joint swelling or tenderness to palpation  PSYCH: A+O to person, place, and time; affect appropriate      LABS:  CAPILLARY BLOOD GLUCOSE      POCT Blood Glucose.: 95 mg/dL (26 Sep 2020 12:39)  POCT Blood Glucose.: 78 mg/dL (26 Sep 2020 08:23)  POCT Blood Glucose.: 94 mg/dL (25 Sep 2020 21:52)  POCT Blood Glucose.: 93 mg/dL (25 Sep 2020 17:26)                          17.5   6.87  )-----------( 221      ( 26 Sep 2020 06:40 )             57.0     09-26    136  |  96<L>  |  33<H>  ----------------------------<  71  3.8   |  23  |  1.40<H>    Ca    9.8      26 Sep 2020 06:40  Phos  3.9     09-25  Mg     2.0     09-25                RADIOLOGY & ADDITIONAL TESTS:    Imaging Personally Reviewed:    Consultant(s) Notes Reviewed:      Care Discussed with Consultants/Other Providers:

## 2020-09-26 NOTE — PROVIDER CONTACT NOTE (CRITICAL VALUE NOTIFICATION) - SITUATION
Pt Hematocrit came back 57, Hemoglobin 17.5. Pt is asymptomatic. Denies chest pain, sob or discomfort. YUKI Boyle notified and aware.

## 2020-09-27 LAB
ANION GAP SERPL CALC-SCNC: 18 MMO/L — HIGH (ref 7–14)
BUN SERPL-MCNC: 33 MG/DL — HIGH (ref 7–23)
CALCIUM SERPL-MCNC: 10.5 MG/DL — SIGNIFICANT CHANGE UP (ref 8.4–10.5)
CHLORIDE SERPL-SCNC: 94 MMOL/L — LOW (ref 98–107)
CO2 SERPL-SCNC: 26 MMOL/L — SIGNIFICANT CHANGE UP (ref 22–31)
CREAT SERPL-MCNC: 1.47 MG/DL — HIGH (ref 0.5–1.3)
GLUCOSE BLDC GLUCOMTR-MCNC: 135 MG/DL — HIGH (ref 70–99)
GLUCOSE BLDC GLUCOMTR-MCNC: 87 MG/DL — SIGNIFICANT CHANGE UP (ref 70–99)
GLUCOSE BLDC GLUCOMTR-MCNC: 90 MG/DL — SIGNIFICANT CHANGE UP (ref 70–99)
GLUCOSE BLDC GLUCOMTR-MCNC: 98 MG/DL — SIGNIFICANT CHANGE UP (ref 70–99)
GLUCOSE SERPL-MCNC: 70 MG/DL — SIGNIFICANT CHANGE UP (ref 70–99)
HCT VFR BLD CALC: 59.6 % — CRITICAL HIGH (ref 39–50)
HGB BLD-MCNC: 18.1 G/DL — HIGH (ref 13–17)
MCHC RBC-ENTMCNC: 25 PG — LOW (ref 27–34)
MCHC RBC-ENTMCNC: 30.4 % — LOW (ref 32–36)
MCV RBC AUTO: 82.2 FL — SIGNIFICANT CHANGE UP (ref 80–100)
NRBC # FLD: 0 K/UL — SIGNIFICANT CHANGE UP (ref 0–0)
PLATELET # BLD AUTO: 239 K/UL — SIGNIFICANT CHANGE UP (ref 150–400)
PMV BLD: 11.9 FL — SIGNIFICANT CHANGE UP (ref 7–13)
POTASSIUM SERPL-MCNC: 4.3 MMOL/L — SIGNIFICANT CHANGE UP (ref 3.5–5.3)
POTASSIUM SERPL-SCNC: 4.3 MMOL/L — SIGNIFICANT CHANGE UP (ref 3.5–5.3)
RBC # BLD: 7.25 M/UL — HIGH (ref 4.2–5.8)
RBC # FLD: 17.3 % — HIGH (ref 10.3–14.5)
SODIUM SERPL-SCNC: 138 MMOL/L — SIGNIFICANT CHANGE UP (ref 135–145)
WBC # BLD: 6.52 K/UL — SIGNIFICANT CHANGE UP (ref 3.8–10.5)
WBC # FLD AUTO: 6.52 K/UL — SIGNIFICANT CHANGE UP (ref 3.8–10.5)

## 2020-09-27 PROCEDURE — 99232 SBSQ HOSP IP/OBS MODERATE 35: CPT | Mod: GC

## 2020-09-27 PROCEDURE — 99233 SBSQ HOSP IP/OBS HIGH 50: CPT

## 2020-09-27 RX ADMIN — Medication 1 MILLIGRAM(S): at 12:05

## 2020-09-27 RX ADMIN — Medication 75 MILLIGRAM(S): at 06:39

## 2020-09-27 RX ADMIN — ATORVASTATIN CALCIUM 40 MILLIGRAM(S): 80 TABLET, FILM COATED ORAL at 22:01

## 2020-09-27 RX ADMIN — LISINOPRIL 40 MILLIGRAM(S): 2.5 TABLET ORAL at 06:39

## 2020-09-27 RX ADMIN — ISOSORBIDE MONONITRATE 30 MILLIGRAM(S): 60 TABLET, EXTENDED RELEASE ORAL at 12:05

## 2020-09-27 RX ADMIN — Medication 75 MILLIGRAM(S): at 14:07

## 2020-09-27 RX ADMIN — PANTOPRAZOLE SODIUM 40 MILLIGRAM(S): 20 TABLET, DELAYED RELEASE ORAL at 06:40

## 2020-09-27 RX ADMIN — Medication 100 MILLIGRAM(S): at 12:05

## 2020-09-27 RX ADMIN — Medication 500 MILLIGRAM(S): at 12:05

## 2020-09-27 RX ADMIN — Medication 40 MILLIGRAM(S): at 17:31

## 2020-09-27 RX ADMIN — Medication 50 MILLIGRAM(S): at 06:40

## 2020-09-27 RX ADMIN — Medication 40 MILLIGRAM(S): at 06:42

## 2020-09-27 RX ADMIN — Medication 81 MILLIGRAM(S): at 12:05

## 2020-09-27 NOTE — PROGRESS NOTE ADULT - PROBLEM SELECTOR PLAN 2
-patient with polycythemia, initially suspected secondary polycythemia given obesity and likely low renal perfusion from low EF; however, epo level is normal x2, raising concern for PV. Pt currently w/ no signs or symptoms/sequela of hyperviscosity   -ARMANDO 2 already sent and results are pending  -c/w ASA for now   -hematology following

## 2020-09-27 NOTE — PROGRESS NOTE ADULT - SUBJECTIVE AND OBJECTIVE BOX
Patient seen and examined at bedside.    Overnight Events: No acute events overnight.       REVIEW OF SYSTEMS:  Constitutional:     [ ] negative [ ] fevers [ ] chills [ ] weight loss [ ] weight gain  HEENT:                  [ ] negative [ ] dry eyes [ ] eye irritation [ ] postnasal drip [ ] nasal congestion  CV:                         [ ] negative  [ ] chest pain [ ] orthopnea [ ] palpitations [ ] murmur  Resp:                     [ ] negative [ ] cough [ ] shortness of breath [ ] dyspnea [ ] wheezing [ ] sputum [ ]hemoptysis  GI:                          [ ] negative [ ] nausea [ ] vomiting [ ] diarrhea [ ] constipation [ ] abd pain [ ] dysphagia   :                        [ ] negative [ ] dysuria [ ] nocturia [ ] hematuria [ ] increased urinary frequency  Musculoskeletal: [ ] negative [ ] back pain [ ] myalgias [ ] arthralgias [ ] fracture  Skin:                       [ ] negative [ ] rash [ ] itch  Neurological:        [ ] negative [ ] headache [ ] dizziness [ ] syncope [ ] weakness [ ] numbness  Psychiatric:           [ ] negative [ ] anxiety [ ] depression  Endocrine:            [ ] negative [ ] diabetes [ ] thyroid problem  Heme/Lymph:      [ ] negative [ ] anemia [ ] bleeding problem  Allergic/Immune: [ ] negative [ ] itchy eyes [ ] nasal discharge [ ] hives [ ] angioedema    [x ] All other systems negative  [ ] Unable to assess ROS due to    Current Meds:  acetaminophen   Tablet .. 650 milliGRAM(s) Oral every 6 hours PRN  ascorbic acid 500 milliGRAM(s) Oral daily  aspirin  chewable 81 milliGRAM(s) Oral daily  atorvastatin 40 milliGRAM(s) Oral at bedtime  dextrose 40% Gel 15 Gram(s) Oral once PRN  dextrose 5%. 1000 milliLiter(s) IV Continuous <Continuous>  dextrose 50% Injectable 12.5 Gram(s) IV Push once  dextrose 50% Injectable 25 Gram(s) IV Push once  dextrose 50% Injectable 25 Gram(s) IV Push once  folic acid 1 milliGRAM(s) Oral daily  furosemide    Tablet 40 milliGRAM(s) Oral two times a day  glucagon  Injectable 1 milliGRAM(s) IntraMuscular once PRN  hydrALAZINE 75 milliGRAM(s) Oral every 8 hours  insulin lispro (HumaLOG) corrective regimen sliding scale   SubCutaneous three times a day before meals  insulin lispro (HumaLOG) corrective regimen sliding scale   SubCutaneous at bedtime  isosorbide   mononitrate ER Tablet (IMDUR) 30 milliGRAM(s) Oral daily  lisinopril 40 milliGRAM(s) Oral daily  metoprolol succinate ER 50 milliGRAM(s) Oral daily  pantoprazole    Tablet 40 milliGRAM(s) Oral before breakfast  thiamine 100 milliGRAM(s) Oral daily      PAST MEDICAL & SURGICAL HISTORY:  Heart failure with reduced ejection fraction    DM (diabetes mellitus)    HLD (hyperlipidemia)    HTN (hypertension)        Vitals:  T(F): 97.1 (09-27), Max: 98.2 (09-26)  HR: 88 (09-27) (71 - 88)  BP: 117/76 (09-27) (92/71 - 117/76)  RR: 17 (09-27)  SpO2: 100% (09-27)  I&O's Summary    26 Sep 2020 07:01  -  27 Sep 2020 07:00  --------------------------------------------------------  IN: 1140 mL / OUT: 750 mL / NET: 390 mL        Physical Exam:  Appearance: No acute distress; well appearing  Eyes: PERRL, EOMI, pink conjunctiva  HENT: Normal oral mucosa  Cardiovascular: RRR, S1, S2, no murmurs, rubs, or gallops; no edema; elevated JVP  Respiratory: Clear to auscultation bilaterally  Gastrointestinal: soft, non-tender, non-distended with normal bowel sounds  Musculoskeletal: No clubbing; no joint deformity   Neurologic: Non-focal  Lymphatic: No lymphadenopathy  Psychiatry: AAOx3, mood & affect appropriate  Skin: No rashes, ecchymoses, or cyanosis                          18.1   6.52  )-----------( 239      ( 27 Sep 2020 06:00 )             59.6     09-27    138  |  94<L>  |  33<H>  ----------------------------<  70  4.3   |  26  |  1.47<H>    Ca    10.5      27 Sep 2020 06:53  Phos  3.9     09-25  Mg     2.0     09-25                    New ECG(s): Personally reviewed      Interpretation of Telemetry: Sinus no alarms

## 2020-09-27 NOTE — PROGRESS NOTE ADULT - SUBJECTIVE AND OBJECTIVE BOX
Patient is a 35y old  Male who presents with a chief complaint of AICD placement (26 Sep 2020 13:30)      SUBJECTIVE / OVERNIGHT EVENTS:    Review of Systems:     MEDICATIONS  (STANDING):  ascorbic acid 500 milliGRAM(s) Oral daily  aspirin  chewable 81 milliGRAM(s) Oral daily  atorvastatin 40 milliGRAM(s) Oral at bedtime  dextrose 5%. 1000 milliLiter(s) (50 mL/Hr) IV Continuous <Continuous>  dextrose 50% Injectable 12.5 Gram(s) IV Push once  dextrose 50% Injectable 25 Gram(s) IV Push once  dextrose 50% Injectable 25 Gram(s) IV Push once  folic acid 1 milliGRAM(s) Oral daily  furosemide    Tablet 40 milliGRAM(s) Oral two times a day  hydrALAZINE 75 milliGRAM(s) Oral every 8 hours  insulin lispro (HumaLOG) corrective regimen sliding scale   SubCutaneous three times a day before meals  insulin lispro (HumaLOG) corrective regimen sliding scale   SubCutaneous at bedtime  isosorbide   mononitrate ER Tablet (IMDUR) 30 milliGRAM(s) Oral daily  lisinopril 40 milliGRAM(s) Oral daily  metoprolol succinate ER 50 milliGRAM(s) Oral daily  pantoprazole    Tablet 40 milliGRAM(s) Oral before breakfast  thiamine 100 milliGRAM(s) Oral daily    MEDICATIONS  (PRN):  acetaminophen   Tablet .. 650 milliGRAM(s) Oral every 6 hours PRN Temp greater or equal to 38C (100.4F), Mild Pain (1 - 3)  dextrose 40% Gel 15 Gram(s) Oral once PRN Blood Glucose LESS THAN 70 milliGRAM(s)/deciliter  glucagon  Injectable 1 milliGRAM(s) IntraMuscular once PRN Glucose LESS THAN 70 milligrams/deciliter      PHYSICAL EXAM:    I&O's Summary    26 Sep 2020 07:01  -  27 Sep 2020 07:00  --------------------------------------------------------  IN: 1140 mL / OUT: 750 mL / NET: 390 mL    Vital Signs Last 24 Hrs  T(C): 36.2 (27 Sep 2020 06:35), Max: 36.8 (26 Sep 2020 23:00)  T(F): 97.1 (27 Sep 2020 06:35), Max: 98.2 (26 Sep 2020 23:00)  HR: 88 (27 Sep 2020 06:35) (71 - 88)  BP: 117/76 (27 Sep 2020 06:35) (92/71 - 117/76)  BP(mean): --  RR: 17 (27 Sep 2020 06:35) (17 - 18)  SpO2: 100% (27 Sep 2020 06:35) (97% - 100%)SKIN:   LABS:  CAPILLARY BLOOD GLUCOSE      POCT Blood Glucose.: 92 mg/dL (26 Sep 2020 22:07)  POCT Blood Glucose.: 137 mg/dL (26 Sep 2020 17:25)  POCT Blood Glucose.: 95 mg/dL (26 Sep 2020 12:39)  POCT Blood Glucose.: 78 mg/dL (26 Sep 2020 08:23)                          17.5   6.87  )-----------( 221      ( 26 Sep 2020 06:40 )             57.0     09-26    136  |  96<L>  |  33<H>  ----------------------------<  71  3.8   |  23  |  1.40<H>    Ca    9.8      26 Sep 2020 06:40  Phos  3.9     09-25  Mg     2.0     09-25                RADIOLOGY & ADDITIONAL TESTS:    Imaging Personally Reviewed:    Consultant(s) Notes Reviewed:      Care Discussed with Consultants/Other Providers: Patient is a 35y old  Male who presents with a chief complaint of AICD placement (26 Sep 2020 13:30)      SUBJECTIVE / OVERNIGHT EVENTS: Pt w/ no complaints this am. Denies SOB, CP, H/A.    Review of Systems:     MEDICATIONS  (STANDING):  ascorbic acid 500 milliGRAM(s) Oral daily  aspirin  chewable 81 milliGRAM(s) Oral daily  atorvastatin 40 milliGRAM(s) Oral at bedtime  dextrose 5%. 1000 milliLiter(s) (50 mL/Hr) IV Continuous <Continuous>  dextrose 50% Injectable 12.5 Gram(s) IV Push once  dextrose 50% Injectable 25 Gram(s) IV Push once  dextrose 50% Injectable 25 Gram(s) IV Push once  folic acid 1 milliGRAM(s) Oral daily  furosemide    Tablet 40 milliGRAM(s) Oral two times a day  hydrALAZINE 75 milliGRAM(s) Oral every 8 hours  insulin lispro (HumaLOG) corrective regimen sliding scale   SubCutaneous three times a day before meals  insulin lispro (HumaLOG) corrective regimen sliding scale   SubCutaneous at bedtime  isosorbide   mononitrate ER Tablet (IMDUR) 30 milliGRAM(s) Oral daily  lisinopril 40 milliGRAM(s) Oral daily  metoprolol succinate ER 50 milliGRAM(s) Oral daily  pantoprazole    Tablet 40 milliGRAM(s) Oral before breakfast  thiamine 100 milliGRAM(s) Oral daily    MEDICATIONS  (PRN):  acetaminophen   Tablet .. 650 milliGRAM(s) Oral every 6 hours PRN Temp greater or equal to 38C (100.4F), Mild Pain (1 - 3)  dextrose 40% Gel 15 Gram(s) Oral once PRN Blood Glucose LESS THAN 70 milliGRAM(s)/deciliter  glucagon  Injectable 1 milliGRAM(s) IntraMuscular once PRN Glucose LESS THAN 70 milligrams/deciliter      PHYSICAL EXAM:    I&O's Summary    26 Sep 2020 07:01  -  27 Sep 2020 07:00  --------------------------------------------------------  IN: 1140 mL / OUT: 750 mL / NET: 390 mL    Vital Signs Last 24 Hrs  T(C): 36.2 (27 Sep 2020 06:35), Max: 36.8 (26 Sep 2020 23:00)  T(F): 97.1 (27 Sep 2020 06:35), Max: 98.2 (26 Sep 2020 23:00)  HR: 88 (27 Sep 2020 06:35) (71 - 88)  BP: 117/76 (27 Sep 2020 06:35) (92/71 - 117/76)  BP(mean): --  RR: 17 (27 Sep 2020 06:35) (17 - 18)  SpO2: 100% (27 Sep 2020 06:35) (97% - 100%)SKIN:   LABS:  CAPILLARY BLOOD GLUCOSE    CONSTITUTIONAL: NAD, well-developed  ENT: MMM, hearing intact  RESPIRATORY: Normal respiratory effort; lungs are clear to auscultation bilaterally  CARDIOVASCULAR: Regular rate and rhythm, normal S1 and S2, no murmur/rub/gallop; No lower extremity edema; Peripheral pulses are 2+ bilaterally  ABDOMEN: Obese. Nontender to palpation, normoactive bowel sounds, no rebound/guarding; No hepatosplenomegaly  MUSCULOSKELETAL: no clubbing or cyanosis of digits; no joint swelling or tender  POCT Blood Glucose.: 92 mg/dL (26 Sep 2020 22:07)  POCT Blood Glucose.: 137 mg/dL (26 Sep 2020 17:25)  POCT Blood Glucose.: 95 mg/dL (26 Sep 2020 12:39)  POCT Blood Glucose.: 78 mg/dL (26 Sep 2020 08:23)                          17.5   6.87  )-----------( 221      ( 26 Sep 2020 06:40 )             57.0     09-26    136  |  96<L>  |  33<H>  ----------------------------<  71  3.8   |  23  |  1.40<H>    Ca    9.8      26 Sep 2020 06:40  Phos  3.9     09-25  Mg     2.0     09-25                RADIOLOGY & ADDITIONAL TESTS:    Imaging Personally Reviewed:    Consultant(s) Notes Reviewed:      Care Discussed with Consultants/Other Providers:

## 2020-09-27 NOTE — PROGRESS NOTE ADULT - ASSESSMENT
35 year old male with hx of HTN, HLD, DM, HFref 2/2  non ischemic dilated cardiomyopathy was admitted at [facility] for HFrEF exacerbation due to medication non-compliance. Now transferred from [facility] for AICD placement. However, procedure on hold due to concern for erythrocytosis in setting of primary vs secondary polycythemia. Epo level on lower end of normal; Per heme, now awaiting ARMANDO 2.

## 2020-09-27 NOTE — PROGRESS NOTE ADULT - ASSESSMENT
This is a 35 year old man with a pmhx of chronic HFrEF (30-35%), NICM, HTN, T2DM, and HLD who present to an OSH for nausea and emesis for 4 days and course was c/b HTN emergency in the setting of HFrEF from medication noncompliant and increased his hydralazine medication to 75mg po q8h. Patient underwent an ischemic evaluation and cardiac computed tomography angiography was negative per OSH record and TTE revealed worsening EF (<15%). Patient was transferred to [facility] for a AICD for a AICD for primary prevention given that the patient has a low EF with a NYHA III despite more than 3months of goal directive medical therapy. Patient was consented with a  ([id number]). The risk and benefits for each procedure were explain in detail which included but not limited to bleeding, infection, stroke, cardiac tamponade and death. Patient expressed understanding an all questions were answered. Hematology consulted for abnormal H/H prior to AICD placement.      Plan:  ADHFrEF 2/2 NICM  - C/w GDMT:  metoprolol 50 QD  Imdur 30   Lisinopril 40 mg QD (monitor Cr)   hydralazine 75 q8  - C/w lasix 40 PO BID.   - Pt does meet requirements for ICD for primary prevention.   - Covid-19 negative on 9/19/2020 from OSH and on 9/22/2020  -Appreciated hematology's consultation, postponed AICD placement for now.  -Will follow up with Hematology, workups for rule out polycythemia vera in progress, EPO level low.   - JAK2 Pending, in lab.   -Plan for ICD implantation after Heme w/u, ideally before d/c.      Cullen Grewal MD  Cardiology Fellow - PGY 6  Text or Call: [phone]  For all New Consults and Questions:  [email or web address]   Login: [email or web address]

## 2020-09-28 LAB
ANION GAP SERPL CALC-SCNC: 17 MMO/L — HIGH (ref 7–14)
BUN SERPL-MCNC: 33 MG/DL — HIGH (ref 7–23)
CALCIUM SERPL-MCNC: 9.9 MG/DL — SIGNIFICANT CHANGE UP (ref 8.4–10.5)
CHLORIDE SERPL-SCNC: 98 MMOL/L — SIGNIFICANT CHANGE UP (ref 98–107)
CO2 SERPL-SCNC: 20 MMOL/L — LOW (ref 22–31)
CREAT SERPL-MCNC: 1.42 MG/DL — HIGH (ref 0.5–1.3)
GLUCOSE BLDC GLUCOMTR-MCNC: 81 MG/DL — SIGNIFICANT CHANGE UP (ref 70–99)
GLUCOSE SERPL-MCNC: 70 MG/DL — SIGNIFICANT CHANGE UP (ref 70–99)
HCT VFR BLD CALC: 56.2 % — HIGH (ref 39–50)
HGB BLD-MCNC: 17.5 G/DL — HIGH (ref 13–17)
MCHC RBC-ENTMCNC: 25.6 PG — LOW (ref 27–34)
MCHC RBC-ENTMCNC: 31.1 % — LOW (ref 32–36)
MCV RBC AUTO: 82.2 FL — SIGNIFICANT CHANGE UP (ref 80–100)
NRBC # FLD: 0 K/UL — SIGNIFICANT CHANGE UP (ref 0–0)
PLATELET # BLD AUTO: 216 K/UL — SIGNIFICANT CHANGE UP (ref 150–400)
PMV BLD: 12.3 FL — SIGNIFICANT CHANGE UP (ref 7–13)
POTASSIUM SERPL-MCNC: 3.9 MMOL/L — SIGNIFICANT CHANGE UP (ref 3.5–5.3)
POTASSIUM SERPL-SCNC: 3.9 MMOL/L — SIGNIFICANT CHANGE UP (ref 3.5–5.3)
RBC # BLD: 6.84 M/UL — HIGH (ref 4.2–5.8)
RBC # FLD: 17.1 % — HIGH (ref 10.3–14.5)
SODIUM SERPL-SCNC: 135 MMOL/L — SIGNIFICANT CHANGE UP (ref 135–145)
WBC # BLD: 6.88 K/UL — SIGNIFICANT CHANGE UP (ref 3.8–10.5)
WBC # FLD AUTO: 6.88 K/UL — SIGNIFICANT CHANGE UP (ref 3.8–10.5)

## 2020-09-28 PROCEDURE — 99232 SBSQ HOSP IP/OBS MODERATE 35: CPT | Mod: GC

## 2020-09-28 PROCEDURE — 99232 SBSQ HOSP IP/OBS MODERATE 35: CPT

## 2020-09-28 RX ADMIN — PANTOPRAZOLE SODIUM 40 MILLIGRAM(S): 20 TABLET, DELAYED RELEASE ORAL at 06:05

## 2020-09-28 RX ADMIN — Medication 75 MILLIGRAM(S): at 06:06

## 2020-09-28 RX ADMIN — Medication 81 MILLIGRAM(S): at 12:20

## 2020-09-28 RX ADMIN — Medication 40 MILLIGRAM(S): at 18:08

## 2020-09-28 RX ADMIN — Medication 75 MILLIGRAM(S): at 22:45

## 2020-09-28 RX ADMIN — LISINOPRIL 40 MILLIGRAM(S): 2.5 TABLET ORAL at 06:05

## 2020-09-28 RX ADMIN — Medication 500 MILLIGRAM(S): at 12:20

## 2020-09-28 RX ADMIN — Medication 1 MILLIGRAM(S): at 12:20

## 2020-09-28 RX ADMIN — Medication 40 MILLIGRAM(S): at 06:05

## 2020-09-28 RX ADMIN — Medication 75 MILLIGRAM(S): at 14:18

## 2020-09-28 RX ADMIN — Medication 100 MILLIGRAM(S): at 12:20

## 2020-09-28 RX ADMIN — ATORVASTATIN CALCIUM 40 MILLIGRAM(S): 80 TABLET, FILM COATED ORAL at 22:45

## 2020-09-28 NOTE — PROGRESS NOTE ADULT - ASSESSMENT
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 Now transferred from [facility] for AICD placement. However, procedure on hold due to concern for erythrocytosis in setting of primary vs secondary polycythemia. Epo level on lower end of normal; Per heme, now awaiting ARMANDO 2.

## 2020-09-28 NOTE — PROGRESS NOTE ADULT - ASSESSMENT
ASSESSMENT/PLAN: Mr. Miramontes is a 35 year old man with a pmhx of HFrEF (30-35%), most recent EF ~15%,  NICM, HTN, T2DM, and HLD who present to an OSH for nausea and emesis for 4 days and course was c/b HTN emergency in the setting of HFrEF from medication noncompliant and increased his hydralazine medication to 75mg po q8h. Patient underwent an ischemic evaluation and cardiac computed tomography angiography was negative per OSH record and TTE revealed worsening EF (<15%). Patient was transferred to [facility] for a AICD for a AICD for primary prevention given that the patient has a low EF with a NYHA III despite more than 3months of goal directive medical therapy. Patient was consented with a  ([id number]). The risk and benefits for each procedure were explain in detail which included but not limited to bleeding, infection, stroke, cardiac tamponade and death. Patient expressed understanding an all questions were answered.   1) ICD implantation pending Hematology clearance.   2) Continue GDMT as recommended including BB     Thank you,  Francesca Wiggins, FNP-C  [phone]

## 2020-09-28 NOTE — PROGRESS NOTE ADULT - SUBJECTIVE AND OBJECTIVE BOX
INTERVAL HISTORY: Patient seen and examined. Feels well. Denies CP, SOB, dizziness, LH, near syncope or syncope.   	  MEDICATIONS:  aspirin  chewable 81 milliGRAM(s) Oral daily  furosemide    Tablet 40 milliGRAM(s) Oral two times a day  hydrALAZINE 75 milliGRAM(s) Oral every 8 hours  isosorbide   mononitrate ER Tablet (IMDUR) 30 milliGRAM(s) Oral daily  lisinopril 40 milliGRAM(s) Oral daily  metoprolol succinate ER 50 milliGRAM(s) Oral daily  acetaminophen   Tablet .. 650 milliGRAM(s) Oral every 6 hours PRN  pantoprazole    Tablet 40 milliGRAM(s) Oral before breakfast  atorvastatin 40 milliGRAM(s) Oral at bedtime  dextrose 40% Gel 15 Gram(s) Oral once PRN  dextrose 50% Injectable 12.5 Gram(s) IV Push once  dextrose 50% Injectable 25 Gram(s) IV Push once  dextrose 50% Injectable 25 Gram(s) IV Push once  glucagon  Injectable 1 milliGRAM(s) IntraMuscular once PRN  insulin lispro (HumaLOG) corrective regimen sliding scale   SubCutaneous three times a day before meals  insulin lispro (HumaLOG) corrective regimen sliding scale   SubCutaneous at bedtime  ascorbic acid 500 milliGRAM(s) Oral daily  dextrose 5%. 1000 milliLiter(s) IV Continuous <Continuous>  folic acid 1 milliGRAM(s) Oral daily  thiamine 100 milliGRAM(s) Oral daily    PHYSICAL EXAM:  T(C): 36.6 (09-28-20 @ 06:00), Max: 36.8 (09-27-20 @ 17:29)  HR: 74 (09-28-20 @ 06:00) (70 - 80)  BP: 115/89 (09-28-20 @ 06:00) (92/65 - 130/71)  RR: 17 (09-28-20 @ 06:00) (17 - 17)  SpO2: 98% (09-28-20 @ 06:00) (98% - 100%)  Wt(kg): --  I&O's Summary    27 Sep 2020 07:01  -  28 Sep 2020 07:00  --------------------------------------------------------  IN: 1450 mL / OUT: 1500 mL / NET: -50 mL          Appearance: Normal	  HEENT:   Normal oral mucosa, PERRL, EOMI	  Lymphatic: No lymphadenopathy  Cardiovascular: Normal S1 S2, No JVD, No murmurs, No edema  Respiratory: Lungs clear to auscultation	  Psychiatry: A & O x 3, Mood & affect appropriate  Gastrointestinal:  Soft, Non-tender, + BS	  Skin: No rashes, No ecchymoses, No cyanosis  Neurologic: Non-focal  Extremities: Normal range of motion, No clubbing, cyanosis or edema  Vascular: Peripheral pulses palpable 2+ bilaterally    TELEMETRY: Sinus rhythm 70's no ectopy noted overnight     	  RADIOLOGY:   DIAGNOSTIC TESTING:  [ x] Echocardiogram: ogr  TE 9/15/2020 from OSH   - LVEF <15%  - Severely decreased global LV systolic function  - Multiple LV regional WMB  - Elevated LA and LVH  - Restrictive pattern of LV diastolic filling  - Severely increased LV internal cavity size   - Moderately dilated LA  - Mild MR  - Tethered mitral chika leaflets  - moderate elevated pulmonary artery systolic pressure  [ ]  Catheterization:  [ ] Stress Test:    OTHER: 	    LABS:	 	    CARDIAC MARKERS:                          17.5   6.88  )-----------( 216      ( 28 Sep 2020 05:50 )             56.2     09-28    135  |  98  |  33<H>  ----------------------------<  70  3.9   |  20<L>  |  1.42<H>    Ca    9.9      28 Sep 2020 05:50

## 2020-09-28 NOTE — PROGRESS NOTE ADULT - SUBJECTIVE AND OBJECTIVE BOX
[facility] Division of Hospital Medicine  Buster Dutton MD  Pager [phone]      Patient is a 35y old  Male who presents with a chief complaint of AICD placement (28 Sep 2020 12:17)      SUBJECTIVE / OVERNIGHT EVENTS: No CP or SOB    MEDICATIONS  (STANDING):  ascorbic acid 500 milliGRAM(s) Oral daily  aspirin  chewable 81 milliGRAM(s) Oral daily  atorvastatin 40 milliGRAM(s) Oral at bedtime  dextrose 5%. 1000 milliLiter(s) (50 mL/Hr) IV Continuous <Continuous>  dextrose 50% Injectable 12.5 Gram(s) IV Push once  dextrose 50% Injectable 25 Gram(s) IV Push once  dextrose 50% Injectable 25 Gram(s) IV Push once  folic acid 1 milliGRAM(s) Oral daily  furosemide    Tablet 40 milliGRAM(s) Oral two times a day  hydrALAZINE 75 milliGRAM(s) Oral every 8 hours  insulin lispro (HumaLOG) corrective regimen sliding scale   SubCutaneous three times a day before meals  insulin lispro (HumaLOG) corrective regimen sliding scale   SubCutaneous at bedtime  isosorbide   mononitrate ER Tablet (IMDUR) 30 milliGRAM(s) Oral daily  lisinopril 40 milliGRAM(s) Oral daily  metoprolol succinate ER 50 milliGRAM(s) Oral daily  pantoprazole    Tablet 40 milliGRAM(s) Oral before breakfast  thiamine 100 milliGRAM(s) Oral daily    MEDICATIONS  (PRN):  acetaminophen   Tablet .. 650 milliGRAM(s) Oral every 6 hours PRN Temp greater or equal to 38C (100.4F), Mild Pain (1 - 3)  dextrose 40% Gel 15 Gram(s) Oral once PRN Blood Glucose LESS THAN 70 milliGRAM(s)/deciliter  glucagon  Injectable 1 milliGRAM(s) IntraMuscular once PRN Glucose LESS THAN 70 milligrams/deciliter      CAPILLARY BLOOD GLUCOSE      POCT Blood Glucose.: 103 mg/dL (28 Sep 2020 12:22)  POCT Blood Glucose.: 81 mg/dL (28 Sep 2020 08:15)  POCT Blood Glucose.: 87 mg/dL (27 Sep 2020 22:25)  POCT Blood Glucose.: 98 mg/dL (27 Sep 2020 17:39)    I&O's Summary    27 Sep 2020 07:01  -  28 Sep 2020 07:00  --------------------------------------------------------  IN: 1450 mL / OUT: 1500 mL / NET: -50 mL    28 Sep 2020 07:01  -  28 Sep 2020 14:49  --------------------------------------------------------  IN: 0 mL / OUT: 300 mL / NET: -300 mL        PHYSICAL EXAM:  Vital Signs Last 24 Hrs  T(C): 36.9 (28 Sep 2020 14:10), Max: 36.9 (28 Sep 2020 14:10)  T(F): 98.5 (28 Sep 2020 14:10), Max: 98.5 (28 Sep 2020 14:10)  HR: 76 (28 Sep 2020 14:10) (70 - 98)  BP: 103/75 (28 Sep 2020 14:10) (92/65 - 115/89)  BP(mean): --  RR: 18 (28 Sep 2020 14:10) (17 - 18)  SpO2: 97% (28 Sep 2020 14:10) (97% - 100%)    CONSTITUTIONAL: NAD  EYES: conjunctiva and sclera clear  ENMT: mmm  NECK: Supple,  RESPIRATORY: Normal respiratory effort; lungs are clear to auscultation bilaterally  CARDIOVASCULAR: Regular rate and rhythm, + S1 and S2  ABDOMEN: Nontender to palpation, normoactive bowel sounds, no rebound/guarding  MUSCULOSKELETAL:  no clubbing or cyanosis of digits;   PSYCH: A+O x 3  NEUROLOGY: no gross deficits   SKIN: No rashes;     LABS:                        17.5   6.88  )-----------( 216      ( 28 Sep 2020 05:50 )             56.2     09-28    135  |  98  |  33<H>  ----------------------------<  70  3.9   |  20<L>  |  1.42<H>    Ca    9.9      28 Sep 2020 05:50

## 2020-09-28 NOTE — PROGRESS NOTE ADULT - SUBJECTIVE AND OBJECTIVE BOX
Hematology Follow-up    INTERVAL HPI/OVERNIGHT EVENTS:  Patient S&E at bedside. No o/n events, patient resting comfortably. No complaints at this time. Patient denies fever, chills, dizziness, weakness, CP, palpitations, SOB, cough, N/V/D/C, dysuria, changes in bowel movements, LE edema.    VITAL SIGNS:  T(F): 97.9 (09-28-20 @ 06:00)  HR: 98 (09-28-20 @ 12:16)  BP: 103/83 (09-28-20 @ 12:16)  RR: 18 (09-28-20 @ 12:16)  SpO2: 99% (09-28-20 @ 12:16)  Wt(kg): --    PHYSICAL EXAM:    Constitutional: AAOx3, NAD, obese  Eyes: EOMI, sclera non-icteric  Neck: supple, no masses, no JVD  Respiratory: CTA b/l, good air entry b/l, no wheezing, rhonchi, rales, with normal respiratory effort and no intercostal retractions  Cardiovascular: RRR, normal S1S2  Gastrointestinal: soft, NTND, no masses palpable, BS normal in all four quadrants, no HSM  Extremities:  no c/c/e  Neurological: Grossly intact  Skin: Normal temperature    MEDICATIONS  (STANDING):  ascorbic acid 500 milliGRAM(s) Oral daily  aspirin  chewable 81 milliGRAM(s) Oral daily  atorvastatin 40 milliGRAM(s) Oral at bedtime  dextrose 5%. 1000 milliLiter(s) (50 mL/Hr) IV Continuous <Continuous>  dextrose 50% Injectable 12.5 Gram(s) IV Push once  dextrose 50% Injectable 25 Gram(s) IV Push once  dextrose 50% Injectable 25 Gram(s) IV Push once  folic acid 1 milliGRAM(s) Oral daily  furosemide    Tablet 40 milliGRAM(s) Oral two times a day  hydrALAZINE 75 milliGRAM(s) Oral every 8 hours  insulin lispro (HumaLOG) corrective regimen sliding scale   SubCutaneous three times a day before meals  insulin lispro (HumaLOG) corrective regimen sliding scale   SubCutaneous at bedtime  isosorbide   mononitrate ER Tablet (IMDUR) 30 milliGRAM(s) Oral daily  lisinopril 40 milliGRAM(s) Oral daily  metoprolol succinate ER 50 milliGRAM(s) Oral daily  pantoprazole    Tablet 40 milliGRAM(s) Oral before breakfast  thiamine 100 milliGRAM(s) Oral daily    MEDICATIONS  (PRN):  acetaminophen   Tablet .. 650 milliGRAM(s) Oral every 6 hours PRN Temp greater or equal to 38C (100.4F), Mild Pain (1 - 3)  dextrose 40% Gel 15 Gram(s) Oral once PRN Blood Glucose LESS THAN 70 milliGRAM(s)/deciliter  glucagon  Injectable 1 milliGRAM(s) IntraMuscular once PRN Glucose LESS THAN 70 milligrams/deciliter      No Known Allergies      LABS:                        17.5   6.88  )-----------( 216      ( 28 Sep 2020 05:50 )             56.2     09-28    135  |  98  |  33<H>  ----------------------------<  70  3.9   |  20<L>  |  1.42<H>    Ca    9.9      28 Sep 2020 05:50           Iron Total, Serum: 153 ug/dL (09-24-20 @ 07:10)  Unsaturated Iron Binding Capacity: 295.1 ug/dL (09-24-20 @ 07:10)  Ferritin, Serum: 231.8 ng/mL (09-24-20 @ 07:10)        RADIOLOGY & ADDITIONAL TESTS:  Studies reviewed.       Hematology Follow-up    INTERVAL HPI/OVERNIGHT EVENTS:  Patient S&E at bedside. No o/n events, patient resting comfortably. No complaints at this time. Patient denies fever, chills, dizziness, weakness, CP, palpitations, SOB, cough, N/V/D/C, dysuria, changes in bowel movements, LE edema.  [place]  German: [id number]    VITAL SIGNS:  T(F): 97.9 (09-28-20 @ 06:00)  HR: 98 (09-28-20 @ 12:16)  BP: 103/83 (09-28-20 @ 12:16)  RR: 18 (09-28-20 @ 12:16)  SpO2: 99% (09-28-20 @ 12:16)  Wt(kg): --    PHYSICAL EXAM:    Constitutional: AAOx3, NAD, obese  Eyes: EOMI, sclera non-icteric  Neck: supple, no masses, no JVD  Respiratory: CTA b/l, good air entry b/l, no wheezing, rhonchi, rales, with normal respiratory effort and no intercostal retractions  Cardiovascular: RRR, normal S1S2  Gastrointestinal: soft, NTND, no masses palpable, BS normal in all four quadrants, no HSM  Extremities:  no c/c/e  Neurological: Grossly intact  Skin: Normal temperature    MEDICATIONS  (STANDING):  ascorbic acid 500 milliGRAM(s) Oral daily  aspirin  chewable 81 milliGRAM(s) Oral daily  atorvastatin 40 milliGRAM(s) Oral at bedtime  dextrose 5%. 1000 milliLiter(s) (50 mL/Hr) IV Continuous <Continuous>  dextrose 50% Injectable 12.5 Gram(s) IV Push once  dextrose 50% Injectable 25 Gram(s) IV Push once  dextrose 50% Injectable 25 Gram(s) IV Push once  folic acid 1 milliGRAM(s) Oral daily  furosemide    Tablet 40 milliGRAM(s) Oral two times a day  hydrALAZINE 75 milliGRAM(s) Oral every 8 hours  insulin lispro (HumaLOG) corrective regimen sliding scale   SubCutaneous three times a day before meals  insulin lispro (HumaLOG) corrective regimen sliding scale   SubCutaneous at bedtime  isosorbide   mononitrate ER Tablet (IMDUR) 30 milliGRAM(s) Oral daily  lisinopril 40 milliGRAM(s) Oral daily  metoprolol succinate ER 50 milliGRAM(s) Oral daily  pantoprazole    Tablet 40 milliGRAM(s) Oral before breakfast  thiamine 100 milliGRAM(s) Oral daily    MEDICATIONS  (PRN):  acetaminophen   Tablet .. 650 milliGRAM(s) Oral every 6 hours PRN Temp greater or equal to 38C (100.4F), Mild Pain (1 - 3)  dextrose 40% Gel 15 Gram(s) Oral once PRN Blood Glucose LESS THAN 70 milliGRAM(s)/deciliter  glucagon  Injectable 1 milliGRAM(s) IntraMuscular once PRN Glucose LESS THAN 70 milligrams/deciliter      No Known Allergies      LABS:                        17.5   6.88  )-----------( 216      ( 28 Sep 2020 05:50 )             56.2     09-28    135  |  98  |  33<H>  ----------------------------<  70  3.9   |  20<L>  |  1.42<H>    Ca    9.9      28 Sep 2020 05:50           Iron Total, Serum: 153 ug/dL (09-24-20 @ 07:10)  Unsaturated Iron Binding Capacity: 295.1 ug/dL (09-24-20 @ 07:10)  Ferritin, Serum: 231.8 ng/mL (09-24-20 @ 07:10)        RADIOLOGY & ADDITIONAL TESTS:  Studies reviewed.

## 2020-09-28 NOTE — PROGRESS NOTE ADULT - ASSESSMENT
36yo Divehi speaking M w/ HTN, HLD, DM, heart failure, non ischemic dilated cardiomyopathy per cardiac CT who presents from [facility] for AICD placement. Hematology consulted for evaluation of erythrocytosis and clearance prior to AICD placement.     # Erythrocytosis  - labs from [facility] reviewed; showed Hb/Hct 15/47.1 on 09/15 and 15.4/48.2 on 09/16  - labs here at [facility] Hb/Hct 18.6/60.7  - does not have any sequelae of erythrocytosis that would warrant immediate intervention (blurry vision, headache, chest pain, etc.)  - may be primary vs secondary erythrocytosis (risk factors obesity with BMI 38)  - EPO level low normal.  - JAK2 sent by primary team to evaluate for polycythemia vera; pending result, may take up to 2 weeks to come back.  - Send flow cytometry with JAK2/SHOAIB/MPL; requisition and tubes given to the nurse.  - Would hold off on any surgical procedure for now until we determine etiology  - please trend CBC daily   - if the patient were to have polycythemia vera, there is increased risk of thrombosis especially with Hct >45. May warrant phlebotomy if determined to have PV          Anali Saenz MD  PGY 5, Oncology/Hematology fellow  (P) [phone]  After 5pm, please contact on-call team.   36yo Persian speaking M w/ HTN, HLD, DM, heart failure, non ischemic dilated cardiomyopathy per cardiac CT who presents from [facility] for AICD placement. Hematology consulted for evaluation of erythrocytosis and clearance prior to AICD placement.     # Erythrocytosis  - labs from [facility] reviewed; showed Hb/Hct 15/47.1 on 09/15 and 15.4/48.2 on 09/16  - labs here at [facility] Hb/Hct 18.6/60.7  - does not have any sequelae of erythrocytosis that would warrant immediate intervention (blurry vision, headache, chest pain, etc.)  - may be primary vs secondary erythrocytosis (risk factors obesity with BMI 38)  - EPO level normal.  - JAK2 sent by primary team to evaluate for polycythemia vera; pending result, may take up to 2 weeks to come back.  - Send flow cytometry with JAK2/SHOAIB/MPL; requisition and tubes were given to the nurse.  - Would hold off on any surgical procedure for now until we determine etiology  - Please trend CBC daily   - if the patient were to have polycythemia vera, there is increased risk of thrombosis especially with Hct >45. May warrant phlebotomy with goal of Hct<45, if determined to have PV.      Hematology will continue to follow with you.        Anali Saenz MD  PGY 5, Oncology/Hematology fellow  (P) [phone]  After 5pm, please contact on-call team.

## 2020-09-29 PROBLEM — E78.5 HYPERLIPIDEMIA, UNSPECIFIED: Chronic | Status: ACTIVE | Noted: 2020-09-22

## 2020-09-29 PROBLEM — I50.20 UNSPECIFIED SYSTOLIC (CONGESTIVE) HEART FAILURE: Chronic | Status: ACTIVE | Noted: 2020-09-22

## 2020-09-29 PROBLEM — I10 ESSENTIAL (PRIMARY) HYPERTENSION: Chronic | Status: ACTIVE | Noted: 2020-09-22

## 2020-09-29 PROBLEM — E11.9 TYPE 2 DIABETES MELLITUS WITHOUT COMPLICATIONS: Chronic | Status: ACTIVE | Noted: 2020-09-22

## 2020-09-29 LAB
ANION GAP SERPL CALC-SCNC: 16 MMO/L — HIGH (ref 7–14)
BASOPHILS # BLD AUTO: 0.1 K/UL — SIGNIFICANT CHANGE UP (ref 0–0.2)
BASOPHILS NFR BLD AUTO: 1.5 % — SIGNIFICANT CHANGE UP (ref 0–2)
BUN SERPL-MCNC: 33 MG/DL — HIGH (ref 7–23)
CALCIUM SERPL-MCNC: 10.3 MG/DL — SIGNIFICANT CHANGE UP (ref 8.4–10.5)
CHLORIDE SERPL-SCNC: 96 MMOL/L — LOW (ref 98–107)
CO2 SERPL-SCNC: 23 MMOL/L — SIGNIFICANT CHANGE UP (ref 22–31)
CREAT SERPL-MCNC: 1.38 MG/DL — HIGH (ref 0.5–1.3)
EOSINOPHIL # BLD AUTO: 0.16 K/UL — SIGNIFICANT CHANGE UP (ref 0–0.5)
EOSINOPHIL NFR BLD AUTO: 2.3 % — SIGNIFICANT CHANGE UP (ref 0–6)
GLUCOSE SERPL-MCNC: 73 MG/DL — SIGNIFICANT CHANGE UP (ref 70–99)
HCT VFR BLD CALC: 57 % — CRITICAL HIGH (ref 39–50)
HGB BLD-MCNC: 17.7 G/DL — HIGH (ref 13–17)
IMM GRANULOCYTES NFR BLD AUTO: 0.1 % — SIGNIFICANT CHANGE UP (ref 0–1.5)
LYMPHOCYTES # BLD AUTO: 1.91 K/UL — SIGNIFICANT CHANGE UP (ref 1–3.3)
LYMPHOCYTES # BLD AUTO: 27.9 % — SIGNIFICANT CHANGE UP (ref 13–44)
MAGNESIUM SERPL-MCNC: 1.9 MG/DL — SIGNIFICANT CHANGE UP (ref 1.6–2.6)
MCHC RBC-ENTMCNC: 24.7 PG — LOW (ref 27–34)
MCHC RBC-ENTMCNC: 31.1 % — LOW (ref 32–36)
MCV RBC AUTO: 79.4 FL — LOW (ref 80–100)
MONOCYTES # BLD AUTO: 0.8 K/UL — SIGNIFICANT CHANGE UP (ref 0–0.9)
MONOCYTES NFR BLD AUTO: 11.7 % — SIGNIFICANT CHANGE UP (ref 2–14)
NEUTROPHILS # BLD AUTO: 3.86 K/UL — SIGNIFICANT CHANGE UP (ref 1.8–7.4)
NEUTROPHILS NFR BLD AUTO: 56.5 % — SIGNIFICANT CHANGE UP (ref 43–77)
NRBC # FLD: 0 K/UL — SIGNIFICANT CHANGE UP (ref 0–0)
PLATELET # BLD AUTO: 212 K/UL — SIGNIFICANT CHANGE UP (ref 150–400)
PMV BLD: 11.1 FL — SIGNIFICANT CHANGE UP (ref 7–13)
POTASSIUM SERPL-MCNC: 3.6 MMOL/L — SIGNIFICANT CHANGE UP (ref 3.5–5.3)
POTASSIUM SERPL-SCNC: 3.6 MMOL/L — SIGNIFICANT CHANGE UP (ref 3.5–5.3)
RBC # BLD: 7.18 M/UL — HIGH (ref 4.2–5.8)
RBC # FLD: 16 % — HIGH (ref 10.3–14.5)
SODIUM SERPL-SCNC: 135 MMOL/L — SIGNIFICANT CHANGE UP (ref 135–145)
WBC # BLD: 6.84 K/UL — SIGNIFICANT CHANGE UP (ref 3.8–10.5)
WBC # FLD AUTO: 6.84 K/UL — SIGNIFICANT CHANGE UP (ref 3.8–10.5)

## 2020-09-29 PROCEDURE — 99232 SBSQ HOSP IP/OBS MODERATE 35: CPT

## 2020-09-29 RX ADMIN — Medication 75 MILLIGRAM(S): at 23:38

## 2020-09-29 RX ADMIN — ISOSORBIDE MONONITRATE 30 MILLIGRAM(S): 60 TABLET, EXTENDED RELEASE ORAL at 11:34

## 2020-09-29 RX ADMIN — Medication 81 MILLIGRAM(S): at 11:34

## 2020-09-29 RX ADMIN — Medication 40 MILLIGRAM(S): at 07:00

## 2020-09-29 RX ADMIN — Medication 75 MILLIGRAM(S): at 07:00

## 2020-09-29 RX ADMIN — Medication 50 MILLIGRAM(S): at 11:34

## 2020-09-29 RX ADMIN — LISINOPRIL 40 MILLIGRAM(S): 2.5 TABLET ORAL at 06:59

## 2020-09-29 RX ADMIN — ATORVASTATIN CALCIUM 40 MILLIGRAM(S): 80 TABLET, FILM COATED ORAL at 21:24

## 2020-09-29 RX ADMIN — Medication 500 MILLIGRAM(S): at 11:34

## 2020-09-29 RX ADMIN — Medication 75 MILLIGRAM(S): at 15:59

## 2020-09-29 RX ADMIN — Medication 100 MILLIGRAM(S): at 11:35

## 2020-09-29 RX ADMIN — Medication 1 MILLIGRAM(S): at 11:34

## 2020-09-29 RX ADMIN — PANTOPRAZOLE SODIUM 40 MILLIGRAM(S): 20 TABLET, DELAYED RELEASE ORAL at 07:00

## 2020-09-29 NOTE — PROGRESS NOTE ADULT - SUBJECTIVE AND OBJECTIVE BOX
[facility] Division of Hospital Medicine  Buster Dutton MD  Pager [phone]      Patient is a 35y old  Male who presents with a chief complaint of AICD placement (29 Sep 2020 10:28)      SUBJECTIVE / OVERNIGHT EVENTS: no CP or SOB    MEDICATIONS  (STANDING):  ascorbic acid 500 milliGRAM(s) Oral daily  aspirin  chewable 81 milliGRAM(s) Oral daily  atorvastatin 40 milliGRAM(s) Oral at bedtime  dextrose 5%. 1000 milliLiter(s) (50 mL/Hr) IV Continuous <Continuous>  dextrose 50% Injectable 12.5 Gram(s) IV Push once  dextrose 50% Injectable 25 Gram(s) IV Push once  dextrose 50% Injectable 25 Gram(s) IV Push once  folic acid 1 milliGRAM(s) Oral daily  furosemide    Tablet 40 milliGRAM(s) Oral two times a day  hydrALAZINE 75 milliGRAM(s) Oral every 8 hours  insulin lispro (HumaLOG) corrective regimen sliding scale   SubCutaneous three times a day before meals  insulin lispro (HumaLOG) corrective regimen sliding scale   SubCutaneous at bedtime  isosorbide   mononitrate ER Tablet (IMDUR) 30 milliGRAM(s) Oral daily  lisinopril 40 milliGRAM(s) Oral daily  metoprolol succinate ER 50 milliGRAM(s) Oral daily  pantoprazole    Tablet 40 milliGRAM(s) Oral before breakfast  thiamine 100 milliGRAM(s) Oral daily    MEDICATIONS  (PRN):  acetaminophen   Tablet .. 650 milliGRAM(s) Oral every 6 hours PRN Temp greater or equal to 38C (100.4F), Mild Pain (1 - 3)  dextrose 40% Gel 15 Gram(s) Oral once PRN Blood Glucose LESS THAN 70 milliGRAM(s)/deciliter  glucagon  Injectable 1 milliGRAM(s) IntraMuscular once PRN Glucose LESS THAN 70 milligrams/deciliter      CAPILLARY BLOOD GLUCOSE      POCT Blood Glucose.: 84 mg/dL (29 Sep 2020 08:13)  POCT Blood Glucose.: 92 mg/dL (28 Sep 2020 22:18)  POCT Blood Glucose.: 122 mg/dL (28 Sep 2020 17:26)    I&O's Summary    28 Sep 2020 07:01  -  29 Sep 2020 07:00  --------------------------------------------------------  IN: 550 mL / OUT: 1000 mL / NET: -450 mL    29 Sep 2020 07:01  -  29 Sep 2020 15:26  --------------------------------------------------------  IN: 600 mL / OUT: 950 mL / NET: -350 mL        PHYSICAL EXAM:  Vital Signs Last 24 Hrs  T(C): 36.9 (29 Sep 2020 11:30), Max: 36.9 (29 Sep 2020 11:30)  T(F): 98.4 (29 Sep 2020 11:30), Max: 98.4 (29 Sep 2020 11:30)  HR: 90 (29 Sep 2020 11:30) (72 - 90)  BP: 133/67 (29 Sep 2020 11:30) (118/86 - 145/92)  BP(mean): --  RR: 18 (29 Sep 2020 11:30) (17 - 18)  SpO2: 100% (29 Sep 2020 11:30) (99% - 100%)    CONSTITUTIONAL: NAD  EYES: conjunctiva and sclera clear  ENMT: mmm  NECK: Supple,  RESPIRATORY: Normal respiratory effort; lungs are clear to auscultation bilaterally  CARDIOVASCULAR: Regular rate and rhythm, + S1 and S2  ABDOMEN: Nontender to palpation, normoactive bowel sounds, no rebound/guarding  MUSCULOSKELETAL:  no clubbing or cyanosis of digits;   PSYCH: A+O x 3  NEUROLOGY: no gross deficits   SKIN: No rashes;     LABS:                        17.7   6.84  )-----------( 212      ( 29 Sep 2020 06:00 )             57.0     09-29    135  |  96<L>  |  33<H>  ----------------------------<  73  3.6   |  23  |  1.38<H>    Ca    10.3      29 Sep 2020 06:00  Mg     1.9     09-29                      COORDINATION OF CARE:  Care Discussed with Consultants/Other Providers [Y/N]: Dr. Pearson   Prior or Outpatient Records Reviewed [Y/N]:

## 2020-09-29 NOTE — PROGRESS NOTE ADULT - SUBJECTIVE AND OBJECTIVE BOX
Bob [id number]    Subjective: No new complaints. Denies HA, lightheadedness, dizziness, CP, SOB, abdominal pain, N/V.      MEDICATIONS  (STANDING):  ascorbic acid 500 milliGRAM(s) Oral daily  aspirin  chewable 81 milliGRAM(s) Oral daily  atorvastatin 40 milliGRAM(s) Oral at bedtime  dextrose 5%. 1000 milliLiter(s) (50 mL/Hr) IV Continuous <Continuous>  dextrose 50% Injectable 12.5 Gram(s) IV Push once  dextrose 50% Injectable 25 Gram(s) IV Push once  dextrose 50% Injectable 25 Gram(s) IV Push once  folic acid 1 milliGRAM(s) Oral daily  furosemide    Tablet 40 milliGRAM(s) Oral two times a day  hydrALAZINE 75 milliGRAM(s) Oral every 8 hours  insulin lispro (HumaLOG) corrective regimen sliding scale   SubCutaneous three times a day before meals  insulin lispro (HumaLOG) corrective regimen sliding scale   SubCutaneous at bedtime  isosorbide   mononitrate ER Tablet (IMDUR) 30 milliGRAM(s) Oral daily  lisinopril 40 milliGRAM(s) Oral daily  metoprolol succinate ER 50 milliGRAM(s) Oral daily  pantoprazole    Tablet 40 milliGRAM(s) Oral before breakfast  thiamine 100 milliGRAM(s) Oral daily    MEDICATIONS  (PRN):  acetaminophen   Tablet .. 650 milliGRAM(s) Oral every 6 hours PRN Temp greater or equal to 38C (100.4F), Mild Pain (1 - 3)  dextrose 40% Gel 15 Gram(s) Oral once PRN Blood Glucose LESS THAN 70 milliGRAM(s)/deciliter  glucagon  Injectable 1 milliGRAM(s) IntraMuscular once PRN Glucose LESS THAN 70 milligrams/deciliter          Vital Signs Last 24 Hrs  T(C): 36.7 (29 Sep 2020 06:58), Max: 36.9 (28 Sep 2020 14:10)  T(F): 98 (29 Sep 2020 06:58), Max: 98.5 (28 Sep 2020 14:10)  HR: 72 (29 Sep 2020 06:58) (72 - 98)  BP: 121/77 (29 Sep 2020 06:58) (103/75 - 145/92)  BP(mean): --  RR: 17 (29 Sep 2020 06:58) (17 - 18)  SpO2: 99% (29 Sep 2020 06:58) (97% - 99%)  I&O's Detail    28 Sep 2020 07:01  -  29 Sep 2020 07:00  --------------------------------------------------------  IN:    Oral Fluid: 550 mL  Total IN: 550 mL    OUT:    Voided (mL): 1000 mL  Total OUT: 1000 mL    Total NET: -450 mL    Physical Exam:  GENERAL: Lying in bed, well appearing, speaking in full sentence with the  in NAD  HEART: S1S2 RRR; No murmurs, rubs, or gallops appreciated  PULMONARY: CTABL, normal respiratory effort.  No rales, wheezing, or rhonchi appreciated bilaterally. No use of accessory muscles  ABDOMEN: + Bowel sounds present, soft, NDNT  EXTREMITIES:  Warm, well -perfused, no pedal edema, distal pulses present    TELEMETERIC: SR 80s no PVC or ectopy noted on                           17.7   6.84  )-----------( 212      ( 29 Sep 2020 06:00 )             57.0       09-29    135  |  96<L>  |  33<H>  ----------------------------<  73  3.6   |  23  |  1.38<H>    Ca    10.3      29 Sep 2020 06:00  Mg     1.9     09-29    TTE 9/15/2020 from OSH   - LVEF <15%  - Severely decreased global LV systolic function  - Multiple LV regional WMB  - Elevated LA and LVH  - Restrictive pattern of LV diastolic filling  - Severely increased LV internal cavity size   - Moderately dilated LA  - Mild MR  - Tethered mitral chika leaflets  - moderate elevated pulmonary artery systolic pressure

## 2020-09-29 NOTE — PROGRESS NOTE ADULT - ASSESSMENT
35 year old male with hx of HTN, HLD, DM, HFref 2/2  non ischemic dilated cardiomyopathy was admitted at [facility] for HFrEF exacerbation due to medication non-compliance. Now transferred from [facility] for AICD placement. However, procedure on hold due to concern for erythrocytosis in setting of primary vs secondary polycythemia. Epo level on lower end of normal; Per heme, now awaiting ARMANDO 2.

## 2020-09-29 NOTE — PROGRESS NOTE ADULT - ASSESSMENT
This is a 35 year old man with a pmhx of HFrEF (30-35%), most recent EF ~15%,  NICM, HTN, T2DM, and HLD who present to an OSH for nausea and emesis for 4 days and course was c/b HTN emergency in the setting of HFrEF from medication noncompliant and increased his hydralazine medication to 75mg po q8h. Patient underwent an ischemic evaluation and cardiac computed tomography angiography was negative per OSH record and TTE revealed worsening EF (<15%). Patient was transferred to [facility] for a AICD for a AICD for primary prevention given that the patient has a low EF with a NYHA III despite more than 3months of goal directive medical therapy. Patient was consented with a  ([id number]). The risk and benefits for each procedure were explain in detail which included but not limited to bleeding, infection, stroke, cardiac tamponade and death. Patient expressed understanding an all questions were answered.     Plan:  HFrEF (<15%) plan for AICD on 9/23/2020  - Continuous telemetric monitor for Afib  - Replete electrolytes for K<4 and Mg<2  - Covid-19 negative on 9/19/2020 from OSH and on 9/22/2020  - Continue management per Heme and continue all other management per primary team   - AICD implantation pending Hematology clearance, can be discharge from Ep standpoint and should follow-up with Dr. Lu as outpatient in 3 weeks  - Continue GDMT as recommended including SUSAN Carvalho PA-C  Patient to be staff with attending. Please awaiting attending addendum      This is a 35 year old man with a pmhx of HFrEF (30-35%), most recent EF ~15%,  NICM, HTN, T2DM, and HLD who present to an OSH for nausea and emesis for 4 days and course was c/b HTN emergency in the setting of HFrEF from medication noncompliant and increased his hydralazine medication to 75mg po q8h. Patient underwent an ischemic evaluation and cardiac computed tomography angiography was negative per OSH record and TTE revealed worsening EF (<15%). Patient was transferred to [facility] for a AICD for a AICD for primary prevention given that the patient has a low EF with a NYHA III despite more than 3months of goal directive medical therapy. Patient was consented with a  ([id number]). The risk and benefits for each procedure were explain in detail which included but not limited to bleeding, infection, stroke, cardiac tamponade and death. Patient expressed understanding an all questions were answered.     Plan:  HFrEF (<15%) plan for AICD on 9/23/2020  - Continuous telemetric monitor for Afib  - Replete electrolytes for K<4 and Mg<2  - Covid-19 negative on 9/19/2020 from OSH and on 9/22/2020  - Continue management per Heme and continue all other management per primary team   - AICD implantation pending Hematology clearance, can be discharge from Ep standpoint and should follow-up with Dr. Lu as outpatient in 3 weeks on 10/30/2020 at 9am  - Continue GDMT as recommended including SUSAN Carvalho PA-C  Patient to be staff with attending. Please awaiting attending addendum

## 2020-09-30 ENCOUNTER — TRANSCRIPTION ENCOUNTER (OUTPATIENT)
Age: 35
End: 2020-09-30

## 2020-09-30 LAB
ANION GAP SERPL CALC-SCNC: 18 MMO/L — HIGH (ref 7–14)
BASOPHILS # BLD AUTO: 0.09 K/UL — SIGNIFICANT CHANGE UP (ref 0–0.2)
BASOPHILS NFR BLD AUTO: 1.3 % — SIGNIFICANT CHANGE UP (ref 0–2)
BUN SERPL-MCNC: 39 MG/DL — HIGH (ref 7–23)
CALCIUM SERPL-MCNC: 10 MG/DL — SIGNIFICANT CHANGE UP (ref 8.4–10.5)
CHLORIDE SERPL-SCNC: 96 MMOL/L — LOW (ref 98–107)
CO2 SERPL-SCNC: 20 MMOL/L — LOW (ref 22–31)
CREAT SERPL-MCNC: 1.64 MG/DL — HIGH (ref 0.5–1.3)
EOSINOPHIL # BLD AUTO: 0.15 K/UL — SIGNIFICANT CHANGE UP (ref 0–0.5)
EOSINOPHIL NFR BLD AUTO: 2.2 % — SIGNIFICANT CHANGE UP (ref 0–6)
GLUCOSE SERPL-MCNC: 70 MG/DL — SIGNIFICANT CHANGE UP (ref 70–99)
HCT VFR BLD CALC: 57.9 % — CRITICAL HIGH (ref 39–50)
HGB BLD-MCNC: 17.6 G/DL — HIGH (ref 13–17)
IMM GRANULOCYTES NFR BLD AUTO: 0.3 % — SIGNIFICANT CHANGE UP (ref 0–1.5)
LYMPHOCYTES # BLD AUTO: 1.68 K/UL — SIGNIFICANT CHANGE UP (ref 1–3.3)
LYMPHOCYTES # BLD AUTO: 24.6 % — SIGNIFICANT CHANGE UP (ref 13–44)
MAGNESIUM SERPL-MCNC: 1.9 MG/DL — SIGNIFICANT CHANGE UP (ref 1.6–2.6)
MCHC RBC-ENTMCNC: 25 PG — LOW (ref 27–34)
MCHC RBC-ENTMCNC: 30.4 % — LOW (ref 32–36)
MCV RBC AUTO: 82.1 FL — SIGNIFICANT CHANGE UP (ref 80–100)
MONOCYTES # BLD AUTO: 0.72 K/UL — SIGNIFICANT CHANGE UP (ref 0–0.9)
MONOCYTES NFR BLD AUTO: 10.6 % — SIGNIFICANT CHANGE UP (ref 2–14)
NEUTROPHILS # BLD AUTO: 4.16 K/UL — SIGNIFICANT CHANGE UP (ref 1.8–7.4)
NEUTROPHILS NFR BLD AUTO: 61 % — SIGNIFICANT CHANGE UP (ref 43–77)
NRBC # FLD: 0 K/UL — SIGNIFICANT CHANGE UP (ref 0–0)
PLATELET # BLD AUTO: 212 K/UL — SIGNIFICANT CHANGE UP (ref 150–400)
PMV BLD: 12.2 FL — SIGNIFICANT CHANGE UP (ref 7–13)
POTASSIUM SERPL-MCNC: 3.7 MMOL/L — SIGNIFICANT CHANGE UP (ref 3.5–5.3)
POTASSIUM SERPL-SCNC: 3.7 MMOL/L — SIGNIFICANT CHANGE UP (ref 3.5–5.3)
RBC # BLD: 7.05 M/UL — HIGH (ref 4.2–5.8)
RBC # FLD: 16.8 % — HIGH (ref 10.3–14.5)
SODIUM SERPL-SCNC: 134 MMOL/L — LOW (ref 135–145)
WBC # BLD: 6.82 K/UL — SIGNIFICANT CHANGE UP (ref 3.8–10.5)
WBC # FLD AUTO: 6.82 K/UL — SIGNIFICANT CHANGE UP (ref 3.8–10.5)

## 2020-09-30 PROCEDURE — 99232 SBSQ HOSP IP/OBS MODERATE 35: CPT | Mod: GC

## 2020-09-30 PROCEDURE — 99232 SBSQ HOSP IP/OBS MODERATE 35: CPT

## 2020-09-30 PROCEDURE — 99233 SBSQ HOSP IP/OBS HIGH 50: CPT

## 2020-09-30 RX ORDER — FUROSEMIDE 40 MG
1 TABLET ORAL
Qty: 0 | Refills: 0 | DISCHARGE

## 2020-09-30 RX ORDER — THIAMINE MONONITRATE (VIT B1) 100 MG
1 TABLET ORAL
Qty: 0 | Refills: 0 | DISCHARGE

## 2020-09-30 RX ORDER — HYDRALAZINE HCL 50 MG
1.5 TABLET ORAL
Qty: 135 | Refills: 0
Start: 2020-09-30 | End: 2020-10-29

## 2020-09-30 RX ORDER — HYDRALAZINE HCL 50 MG
75 TABLET ORAL
Qty: 0 | Refills: 0 | DISCHARGE

## 2020-09-30 RX ORDER — METOPROLOL TARTRATE 50 MG
1 TABLET ORAL
Qty: 30 | Refills: 0
Start: 2020-09-30 | End: 2020-10-29

## 2020-09-30 RX ORDER — FUROSEMIDE 40 MG
1 TABLET ORAL
Qty: 30 | Refills: 0
Start: 2020-09-30 | End: 2020-10-29

## 2020-09-30 RX ORDER — FUROSEMIDE 40 MG
40 TABLET ORAL DAILY
Refills: 0 | Status: DISCONTINUED | OUTPATIENT
Start: 2020-09-30 | End: 2020-10-02

## 2020-09-30 RX ORDER — PANTOPRAZOLE SODIUM 20 MG/1
1 TABLET, DELAYED RELEASE ORAL
Qty: 0 | Refills: 0 | DISCHARGE

## 2020-09-30 RX ORDER — ASCORBIC ACID 60 MG
1 TABLET,CHEWABLE ORAL
Qty: 30 | Refills: 0
Start: 2020-09-30 | End: 2020-10-29

## 2020-09-30 RX ORDER — ASCORBIC ACID 60 MG
1 TABLET,CHEWABLE ORAL
Qty: 0 | Refills: 0 | DISCHARGE

## 2020-09-30 RX ORDER — ASPIRIN/CALCIUM CARB/MAGNESIUM 324 MG
1 TABLET ORAL
Qty: 0 | Refills: 0 | DISCHARGE

## 2020-09-30 RX ORDER — METOPROLOL TARTRATE 50 MG
1 TABLET ORAL
Qty: 0 | Refills: 0 | DISCHARGE

## 2020-09-30 RX ORDER — FOLIC ACID 0.8 MG
1 TABLET ORAL
Qty: 30 | Refills: 0
Start: 2020-09-30 | End: 2020-10-29

## 2020-09-30 RX ORDER — FOLIC ACID 0.8 MG
1 TABLET ORAL
Qty: 0 | Refills: 0 | DISCHARGE

## 2020-09-30 RX ORDER — LISINOPRIL 2.5 MG/1
1 TABLET ORAL
Qty: 30 | Refills: 0
Start: 2020-09-30 | End: 2020-10-29

## 2020-09-30 RX ORDER — ISOSORBIDE MONONITRATE 60 MG/1
1 TABLET, EXTENDED RELEASE ORAL
Qty: 0 | Refills: 0 | DISCHARGE

## 2020-09-30 RX ORDER — PANTOPRAZOLE SODIUM 20 MG/1
1 TABLET, DELAYED RELEASE ORAL
Qty: 30 | Refills: 0
Start: 2020-09-30 | End: 2020-10-29

## 2020-09-30 RX ORDER — ATORVASTATIN CALCIUM 80 MG/1
1 TABLET, FILM COATED ORAL
Qty: 30 | Refills: 0
Start: 2020-09-30 | End: 2020-10-29

## 2020-09-30 RX ORDER — ISOSORBIDE MONONITRATE 60 MG/1
1 TABLET, EXTENDED RELEASE ORAL
Qty: 30 | Refills: 0
Start: 2020-09-30 | End: 2020-10-29

## 2020-09-30 RX ORDER — LISINOPRIL 2.5 MG/1
1 TABLET ORAL
Qty: 0 | Refills: 0 | DISCHARGE

## 2020-09-30 RX ORDER — THIAMINE MONONITRATE (VIT B1) 100 MG
1 TABLET ORAL
Qty: 30 | Refills: 0
Start: 2020-09-30 | End: 2020-10-29

## 2020-09-30 RX ORDER — ATORVASTATIN CALCIUM 80 MG/1
1 TABLET, FILM COATED ORAL
Qty: 0 | Refills: 0 | DISCHARGE

## 2020-09-30 RX ADMIN — Medication 50 MILLIGRAM(S): at 12:07

## 2020-09-30 RX ADMIN — ATORVASTATIN CALCIUM 40 MILLIGRAM(S): 80 TABLET, FILM COATED ORAL at 21:39

## 2020-09-30 RX ADMIN — Medication 1 MILLIGRAM(S): at 12:06

## 2020-09-30 RX ADMIN — Medication 500 MILLIGRAM(S): at 12:06

## 2020-09-30 RX ADMIN — LISINOPRIL 40 MILLIGRAM(S): 2.5 TABLET ORAL at 05:37

## 2020-09-30 RX ADMIN — Medication 75 MILLIGRAM(S): at 21:39

## 2020-09-30 RX ADMIN — ISOSORBIDE MONONITRATE 30 MILLIGRAM(S): 60 TABLET, EXTENDED RELEASE ORAL at 12:07

## 2020-09-30 RX ADMIN — Medication 100 MILLIGRAM(S): at 12:06

## 2020-09-30 RX ADMIN — PANTOPRAZOLE SODIUM 40 MILLIGRAM(S): 20 TABLET, DELAYED RELEASE ORAL at 05:37

## 2020-09-30 RX ADMIN — Medication 40 MILLIGRAM(S): at 05:37

## 2020-09-30 RX ADMIN — Medication 81 MILLIGRAM(S): at 12:06

## 2020-09-30 NOTE — DISCHARGE NOTE PROVIDER - CARE PROVIDERS DIRECT ADDRESSES
,DirectAddress_Unknown ,[email or web address]

## 2020-09-30 NOTE — PROGRESS NOTE ADULT - SUBJECTIVE AND OBJECTIVE BOX
[facility] Division of Hospital Medicine  Buster Dutton MD  Pager [phone]      Patient is a 35y old  Male who presents with a chief complaint of AICD placement (30 Sep 2020 10:29)      SUBJECTIVE / OVERNIGHT EVENTS:  No CP or SOB    MEDICATIONS  (STANDING):  ascorbic acid 500 milliGRAM(s) Oral daily  aspirin  chewable 81 milliGRAM(s) Oral daily  atorvastatin 40 milliGRAM(s) Oral at bedtime  dextrose 5%. 1000 milliLiter(s) (50 mL/Hr) IV Continuous <Continuous>  dextrose 50% Injectable 12.5 Gram(s) IV Push once  dextrose 50% Injectable 25 Gram(s) IV Push once  dextrose 50% Injectable 25 Gram(s) IV Push once  folic acid 1 milliGRAM(s) Oral daily  furosemide    Tablet 40 milliGRAM(s) Oral two times a day  hydrALAZINE 75 milliGRAM(s) Oral every 8 hours  insulin lispro (HumaLOG) corrective regimen sliding scale   SubCutaneous three times a day before meals  insulin lispro (HumaLOG) corrective regimen sliding scale   SubCutaneous at bedtime  isosorbide   mononitrate ER Tablet (IMDUR) 30 milliGRAM(s) Oral daily  lisinopril 40 milliGRAM(s) Oral daily  metoprolol succinate ER 50 milliGRAM(s) Oral daily  pantoprazole    Tablet 40 milliGRAM(s) Oral before breakfast  thiamine 100 milliGRAM(s) Oral daily    MEDICATIONS  (PRN):  acetaminophen   Tablet .. 650 milliGRAM(s) Oral every 6 hours PRN Temp greater or equal to 38C (100.4F), Mild Pain (1 - 3)  dextrose 40% Gel 15 Gram(s) Oral once PRN Blood Glucose LESS THAN 70 milliGRAM(s)/deciliter  glucagon  Injectable 1 milliGRAM(s) IntraMuscular once PRN Glucose LESS THAN 70 milligrams/deciliter      CAPILLARY BLOOD GLUCOSE      POCT Blood Glucose.: 86 mg/dL (30 Sep 2020 12:14)  POCT Blood Glucose.: 82 mg/dL (30 Sep 2020 08:18)  POCT Blood Glucose.: 82 mg/dL (29 Sep 2020 22:10)  POCT Blood Glucose.: 114 mg/dL (29 Sep 2020 17:40)    I&O's Summary    29 Sep 2020 07:01  -  30 Sep 2020 07:00  --------------------------------------------------------  IN: 820 mL / OUT: 1500 mL / NET: -680 mL    30 Sep 2020 07:01  -  30 Sep 2020 14:56  --------------------------------------------------------  IN: 180 mL / OUT: 300 mL / NET: -120 mL        PHYSICAL EXAM:  Vital Signs Last 24 Hrs  T(C): 36.6 (30 Sep 2020 12:04), Max: 36.7 (30 Sep 2020 09:00)  T(F): 97.9 (30 Sep 2020 12:04), Max: 98 (30 Sep 2020 09:00)  HR: 82 (30 Sep 2020 12:04) (71 - 82)  BP: 126/79 (30 Sep 2020 12:04) (94/67 - 127/65)  BP(mean): --  RR: 18 (30 Sep 2020 12:04) (18 - 18)  SpO2: 100% (30 Sep 2020 12:04) (99% - 100%)  CONSTITUTIONAL: NAD  EYES: conjunctiva and sclera clear  ENMT: mmm  NECK: Supple,  RESPIRATORY: Normal respiratory effort; lungs are clear to auscultation bilaterally  CARDIOVASCULAR: Regular rate and rhythm, + S1 and S2  ABDOMEN: Nontender to palpation, normoactive bowel sounds, no rebound/guarding  MUSCULOSKELETAL:  no clubbing or cyanosis of digits;   PSYCH: A+O x 3  NEUROLOGY: no gross deficits   SKIN: No rashes;     LABS:                        17.6   6.82  )-----------( 212      ( 30 Sep 2020 07:00 )             57.9     09-30    134<L>  |  96<L>  |  39<H>  ----------------------------<  70  3.7   |  20<L>  |  1.64<H>    Ca    10.0      30 Sep 2020 07:00  Mg     1.9     09-30

## 2020-09-30 NOTE — DIETITIAN INITIAL EVALUATION ADULT. - PERTINENT LABORATORY DATA
09-30 Na134 mmol/L<L> Glu 70 mg/dL K+ 3.7 mmol/L Cr  1.64 mg/dL<H> BUN 39 mg/dL<H> 09-25 Phos 3.9 mg/dL    A1C with Estimated Average Glucose: 6.6 %

## 2020-09-30 NOTE — DISCHARGE NOTE PROVIDER - NSDCMRMEDTOKEN_GEN_ALL_CORE_FT
ascorbic acid 500 mg oral tablet: 1 tab(s) orally once a day  atorvastatin 40 mg oral tablet: 1 tab(s) orally once a day  folic acid 1 mg oral tablet: 1 tab(s) orally once a day  hydrALAZINE: 75 milligram(s) orally every 8 hours  isosorbide mononitrate 30 mg oral tablet, extended release: 1 tab(s) orally once a day (in the morning)  Lasix 40 mg oral tablet: 1 tab(s) orally once a day  lisinopril 40 mg oral tablet: 1 tab(s) orally once a day  metoprolol succinate 50 mg oral tablet, extended release: 1 tab(s) orally once a day  pantoprazole 40 mg oral delayed release tablet: 1 tab(s) orally once a day  thiamine 100 mg oral tablet: 1 tab(s) orally once a day   ascorbic acid 500 mg oral tablet: 1 tab(s) orally once a day  atorvastatin 40 mg oral tablet: 1 tab(s) orally once a day  folic acid 1 mg oral tablet: 1 tab(s) orally once a day  hydrALAZINE 50 mg oral tablet: 1.5 tab(s) orally 3 times a day   isosorbide mononitrate 30 mg oral tablet, extended release: 1 tab(s) orally once a day (in the morning)  Lasix 40 mg oral tablet: 1 tab(s) orally once a day  lisinopril 40 mg oral tablet: 1 tab(s) orally once a day  metoprolol succinate 50 mg oral tablet, extended release: 1 tab(s) orally once a day  pantoprazole 40 mg oral delayed release tablet: 1 tab(s) orally once a day  thiamine 100 mg oral tablet: 1 tab(s) orally once a day

## 2020-09-30 NOTE — DISCHARGE NOTE PROVIDER - NSDCFUADDAPPT_GEN_ALL_CORE_FT
Follow up with hematology within 1-2 weeks of discharge.  Follow up with electrophysiology team within 1-2 weeks of discharge.  Follow up with your primary care physician for further monitoring in 1-2 weeks. Please call to arrange appointment.   Follow up with hematology within 1-2 weeks of discharge.  Follow up with electrophysiology team within 1-2 weeks of discharge.  Follow up with your primary care physician for further monitoring in 1-2 weeks. Please call to arrange appointment.  Follow up with cardiology within 1-2 weeks of discharge.     Follow up with hematology within 1-2 weeks of discharge.  Follow up with electrophysiology team within 1-2 weeks of discharge.  Follow up with your primary care physician for further monitoring in 1-2 weeks. Please call to arrange appointment.  Follow up with cardiology within 1-2 weeks of discharge. You have an appointment made for you 10/12 at 2:30pm      Follow up for AICD wound check on 10/13 at 14:15     Follow up for AICD wound check on 10/13 at 2:15 PM    Follow up appointment made with Dr. Alexander on 10/12 at 2:30 PM

## 2020-09-30 NOTE — DISCHARGE NOTE PROVIDER - INSTRUCTIONS
Low Salt  Low carbohydrate Low salt, low fat and low cholesterol diet.   Carbohydrate controlled diabetes diet.

## 2020-09-30 NOTE — DISCHARGE NOTE PROVIDER - NSFOLLOWUPCLINICS_GEN_ALL_ED_FT
[facility]  Hematology/Oncology  [address] [id number]  Phone: [phone]  Fax:   Follow Up Time:

## 2020-09-30 NOTE — PROGRESS NOTE ADULT - SUBJECTIVE AND OBJECTIVE BOX
Patient is a 35y old  Male who presents with a chief complaint of AICD placement (30 Sep 2020 08:25)  Patient denies CP, SOB or palpitations.  Tolerating meds well.      PAST MEDICAL & SURGICAL HISTORY:  Heart failure with reduced ejection fraction    DM (diabetes mellitus)    HLD (hyperlipidemia)    HTN (hypertension)        MEDICATIONS  (STANDING):  ascorbic acid 500 milliGRAM(s) Oral daily  aspirin  chewable 81 milliGRAM(s) Oral daily  atorvastatin 40 milliGRAM(s) Oral at bedtime  dextrose 5%. 1000 milliLiter(s) (50 mL/Hr) IV Continuous <Continuous>  dextrose 50% Injectable 12.5 Gram(s) IV Push once  dextrose 50% Injectable 25 Gram(s) IV Push once  dextrose 50% Injectable 25 Gram(s) IV Push once  folic acid 1 milliGRAM(s) Oral daily  furosemide    Tablet 40 milliGRAM(s) Oral two times a day  hydrALAZINE 75 milliGRAM(s) Oral every 8 hours  insulin lispro (HumaLOG) corrective regimen sliding scale   SubCutaneous three times a day before meals  insulin lispro (HumaLOG) corrective regimen sliding scale   SubCutaneous at bedtime  isosorbide   mononitrate ER Tablet (IMDUR) 30 milliGRAM(s) Oral daily  lisinopril 40 milliGRAM(s) Oral daily  metoprolol succinate ER 50 milliGRAM(s) Oral daily  pantoprazole    Tablet 40 milliGRAM(s) Oral before breakfast  thiamine 100 milliGRAM(s) Oral daily    MEDICATIONS  (PRN):  acetaminophen   Tablet .. 650 milliGRAM(s) Oral every 6 hours PRN Temp greater or equal to 38C (100.4F), Mild Pain (1 - 3)  dextrose 40% Gel 15 Gram(s) Oral once PRN Blood Glucose LESS THAN 70 milliGRAM(s)/deciliter  glucagon  Injectable 1 milliGRAM(s) IntraMuscular once PRN Glucose LESS THAN 70 milligrams/deciliter            Vital Signs Last 24 Hrs  T(C): 36.7 (30 Sep 2020 09:00), Max: 36.9 (29 Sep 2020 11:30)  T(F): 98 (30 Sep 2020 09:00), Max: 98.4 (29 Sep 2020 11:30)  HR: 73 (30 Sep 2020 09:00) (71 - 90)  BP: 117/68 (30 Sep 2020 09:00) (94/67 - 133/67)  BP(mean): --  RR: 18 (30 Sep 2020 05:37) (18 - 18)  SpO2: 99% (30 Sep 2020 09:00) (99% - 100%)            INTERPRETATION OF TELEMETRY:  SR 80's; occasional 's     ECG:        LABS:                        17.6   6.82  )-----------( 212      ( 30 Sep 2020 07:00 )             57.9     09-30    134<L>  |  96<L>  |  39<H>  ----------------------------<  70  3.7   |  20<L>  |  1.64<H>    Ca    10.0      30 Sep 2020 07:00  Mg     1.9     09-30                BNP  RADIOLOGY & ADDITIONAL STUDIES:        PHYSICAL EXAM:    GENERAL: In no apparent distress, well nourished, and hydrated.  NECK: Supple and normal thyroid.  No JVD or carotid bruit.  Carotid pulse is 2+ bilaterally.  HEART: Regular rate and rhythm; No murmurs, rubs, or gallops.  PULMONARY: Clear to auscultation and perfusion.  No rales, wheezing, or rhonchi bilaterally.  ABDOMEN: Soft, Nontender, Nondistended; Bowel sounds present  EXTREMITIES:  2+ Peripheral Pulses, No clubbing, cyanosis, or edema  NEUROLOGICAL: Grossly nonfocal

## 2020-09-30 NOTE — DIETITIAN INITIAL EVALUATION ADULT. - OTHER INFO
Initial Dietitian Evaluation 2/2 to extended length of stay. Patient is a 35 year old male with hx of HTN, HLD, DM, HFrEF2/2  non ischemic dilated cardiomyopathy was admitted at [facility] for HFrEF exacerbation due to medication non-compliance. Now transferred from [facility] for AICD placement. However, procedure on hold due to concern for erythrocytosis in setting of primary -vs- secondary polycythemia.    RDN met with patient at bedside, patient is Swedish speaking this RDN able to communicate with patient in his preferred language. He denies any nausea/vomiting/diarrhea/constipation or difficulty chewing and swallowing. Patient reports good PO intake at present and prior to admission. Pt endorses following a low na diet prior to admission, but states that he does add "a little salt" to foods. UBW 265lb >1 year ago, weight on this admission 103.5 kg ( 228.1lb) 9/24. He endorses weight loss but unclear if this was intentional vs unintentional vs fluid related in setting of HF. The Pt was provided with Heart Healthy diet education (low sodium, low cholesterol, "good fats" vs "bad fats," fiber, label reading, meal planning, and daily weight monitoring), which he verbalized comprehension of. Also, RD provided the patient with extensive DM diet education; including, carb counting, label reading, and meal planning. Carbohydrate sources extensively reviewed and better choices encouraged. Mixed meals also encouraged to promote glycemic control. Written materials provided on all topics discussed.  Suggest outpatient follow up with an Endocrinologist and Dietitian to ensure long-term DM diet comprehension and compliance. RD remains available, re-consult as needed.

## 2020-09-30 NOTE — DISCHARGE NOTE PROVIDER - HOSPITAL COURSE
35 year old male with hx of HTN, HLD, DM, non ischemic dilated cardiomyopathy, admitted at [facility] for HFrEF exacerbation, transferred for AICD placement. Course complicated by erythrocytosis.    Heart failure with reduced ejection fraction  - TTE on 9/15/20 with EF <15%  - AICD placement unable to be performed due to erythrocytosis.   -AICD placement deferred to 10/30/2020 at 9am as outpatient     Erythrocytosis   -heme following  -unable to be cleared for AIC placement due to risk of thrombosis  -possibly 2/2 polycythemia vera   -JAK2 pending, will take 2 weeks to result   -second opinion, Dr. balderas consulted    HTN, HLD   - metoprolol, hydralazine, lisinopril, imdur   - atorvastatin   - 9/15/20 FLP showed cholesterol = 136; TG = 80; HDL = 26; LDL = 108.     DM   - hgba1c ---6.6         35 year old male with hx of HTN, HLD, DM, non ischemic dilated cardiomyopathy, admitted at [facility] for HFrEF exacerbation, transferred for AICD placement. Course complicated by erythrocytosis.    Heart failure with reduced ejection fraction  - TTE on 9/15/20 with EF <15%  - AICD placement unable to be performed due to erythrocytosis.   -AICD placement deferred to 10/30/2020 at 9am as outpatient with Dr. Lu  -Lasix decreased to 40mg qd   - Follow up with cardiology within 1-2 weeks of discharge.    Erythrocytosis   -heme following  -unable to be cleared for AIC placement due to risk of thrombosis  -possibly 2/2 polycythemia vera   -JAK2 pending, will take 2 weeks to result   - Follow up with hematology within 1-2 weeks of discharge.    HTN, HLD   - metoprolol, hydralazine, lisinopril, imdur   - atorvastatin   - 9/15/20 FLP showed cholesterol = 136; TG = 80; HDL = 26; LDL = 108.     DM   - hgba1c ---6.6         35 year old male with hx of HTN, HLD, DM, non ischemic dilated cardiomyopathy, admitted at [facility] for HFrEF exacerbation, transferred for AICD placement. Course complicated by erythrocytosis.    Heart failure with reduced ejection fraction  - TTE on 9/15/20 with EF <15%  - AICD placement unable to be performed due to erythrocytosis.   -AICD placement deferred to 10/30/2020 at 9am as outpatient with Dr. Lu  -Lasix decreased to 40mg qd   - Follow up with cardiology within 1-2 weeks of discharge.    Erythrocytosis   -heme following  -unable to be cleared for AIC placement due to risk of thrombosis  -possibly 2/2 polycythemia vera   -JAK2 pending, will take 2 weeks to result   - Follow up with hematology within 1-2 weeks of discharge.    HTN, HLD   - metoprolol, hydralazine, lisinopril, imdur   - atorvastatin   - 9/15/20 FLP showed cholesterol = 136; TG = 80; HDL = 26; LDL = 108.     DM   - hgba1c ---6.6         35 year old male with hx of HTN, HLD, DM, non ischemic dilated cardiomyopathy, admitted at [facility] for HFrEF exacerbation, transferred for AICD placement. Course complicated by erythrocytosis.    Heart failure with reduced ejection fraction  - TTE on 9/15/20 with EF <15%  -AICD placed 10/1/2020  -Lasix decreased to 40mg qd   - Follow up with cardiology within 1-2 weeks of discharge.    Erythrocytosis   -heme following  -JAK2 negative, Polycythemia Vera ruled out  -Hematology clearance obtained for AICD placement   - Follow up with hematology within 1-2 weeks of discharge.    HTN, HLD   - metoprolol, hydralazine, lisinopril, imdur   - atorvastatin   - 9/15/20 FLP showed cholesterol = 136; TG = 80; HDL = 26; LDL = 108.     DM   - hgba1c ---6.6     35 year old male with hx of HTN, HLD, DM, non ischemic dilated cardiomyopathy, admitted at [facility] for HFrEF exacerbation, transferred for AICD placement. TTE on 9/15/2020 with EF <15%. AICD placed 10/1/2020. Following device placement, patient developed hypotension requiring phenylephrine drip (likely secondary to anesthetics used during procedure). CCU consulted however patient remained hemodynamically stable with improved blood pressure. TTE ____________Course complicated by erythrocytosis. Hematology consulted- JAK2 negative and polycythemia vera ruled out; no need for phlebotomy. Patient found to have an JO and urinary retention requiring straight cath. Lasix and lisinopril were held.    35 year old male with hx of HTN, HLD, DM, non ischemic dilated cardiomyopathy, admitted at [facility] for HFrEF exacerbation, transferred for AICD placement. TTE on 9/15/2020 with EF <15%. AICD placed 10/1/2020. Following device placement, patient developed hypotension requiring phenylephrine drip (likely secondary to anesthetics used during procedure). CCU consulted however patient remained hemodynamically stable with improved blood pressure. TTE ____________Course complicated by erythrocytosis. Hematology consulted- JAK2 negative and polycythemia vera ruled out; no need for phlebotomy. Patient found to have an JO and urinary retention requiring straight cath. Lasix and lisinopril were held.     Does not have a cardiologist he routinely follows with. Follow up appointment made with Dr. Alexander on 10/12 at 2:30 PM.    35 year old male with hx of HTN, HLD, DM, non ischemic dilated cardiomyopathy, admitted at [facility] for HFrEF exacerbation, transferred for AICD placement. TTE on 9/15/2020 with EF <15%. AICD placed 10/1/2020. Following device placement, patient developed hypotension requiring phenylephrine drip (likely secondary to anesthetics used during procedure). CCU consulted however patient remained hemodynamically stable with improved blood pressure. TTE at [facility] following device placement with improved EF 39%, no significant valvular abnormalities or WMA. Course complicated by erythrocytosis. Hematology consulted- JAK2 negative and polycythemia vera ruled out; no need for phlebotomy. Patient found to have an JO and urinary retention requiring straight cath. Lasix and lisinopril were held. Urinary retention self-resolved, patient now voiding normally and creatinine improved.     Does not have a cardiologist he routinely follows with. Follow up appointment made with Dr. Alexander on 10/12 at 2:30 PM.        35 year old male with hx of HTN, HLD, DM, non ischemic dilated cardiomyopathy, admitted at [facility] for HFrEF exacerbation, transferred for AICD placement. TTE on 9/15/2020 with EF <15%. AICD placed 10/1/2020. Following device placement, patient developed hypotension requiring phenylephrine drip (likely secondary to anesthetics used during procedure). CCU consulted however patient remained hemodynamically stable with improved blood pressure. TTE at [facility] following device placement with improved EF 39%, no significant valvular abnormalities or WMA. Course complicated by erythrocytosis. Hematology consulted- JAK2 negative and polycythemia vera ruled out; no need for phlebotomy. Patient found to have an JO and urinary retention requiring straight cath. Lasix and lisinopril were held. Urinary retention self-resolved, patient now voiding normally and creatinine improved.     Does not have a cardiologist he routinely follows with. Follow up appointment made with Dr. Alexander on 10/12 at 2:30 PM.     Patient seen and evaluated. Reviewed discharge medications with patient and attending. All new medications requiring new prescriptions were sent to the pharmacy of patient's choice. Reviewed need for prescription for previous home medications and new prescriptions sent if requested. Medically cleared/stable for discharge as per Dr. Sykes on 10/3/2020 with appropriate follow up. Patient understands and agrees with plan of care.

## 2020-09-30 NOTE — CHART NOTE - NSCHARTNOTEFT_GEN_A_CORE
36yo Swedish speaking M w/ HTN, HLD, DM, heart failure, non ischemic dilated cardiomyopathy per cardiac CT who presents from [facility] for AICD placement. Hematology consulted for evaluation of erythrocytosis and clearance prior to AICD placement.     # Erythrocytosis  - labs from [facility] reviewed; showed Hb/Hct 15/47.1 on 09/15 and 15.4/48.2 on 09/16  - labs here at [facility] Hb/Hct 18.6/60.7  - does not have any sequelae of erythrocytosis that would warrant immediate intervention (blurry vision, headache, chest pain, etc.)  - may be primary vs secondary erythrocytosis (risk factors obesity with BMI 38); JAK2 V617F mutation came back negative, likely 2ndary polycythemia.   - EPO level normal.  - No specific intervention required from a hematology standpoint, patient can proceed with AICD placement with standard precautions given by the cardiology team.  - Please trend CBC   - Communicated with the primary team. Contact hematology should you have any more questions.        Anali Saenz MD  PGY 5, Oncology/Hematology fellow  (P) [phone]  After 5pm, please contact on-call team. 36yo Turkmen speaking M w/ HTN, HLD, DM, heart failure, non ischemic dilated cardiomyopathy per cardiac CT who presents from [facility] for AICD placement. Hematology consulted for evaluation of erythrocytosis and clearance prior to AICD placement.     # Erythrocytosis  - labs from [facility] reviewed; showed Hb/Hct 15/47.1 on 09/15 and 15.4/48.2 on 09/16  - labs here at [facility] Hb/Hct 18.6/60.7  - does not have any sequelae of erythrocytosis that would warrant immediate intervention (blurry vision, headache, chest pain, etc.)  - may be primary vs secondary erythrocytosis (risk factors obesity with BMI 38); JAK2 V617F mutation came back negative, very likely 2ndary polycythemia.   - EPO level normal.  - No specific intervention required from a hematology standpoint, patient can proceed with AICD placement with standard precautions given by the cardiology team.  - Please trend CBC   - Communicated with the primary team. Contact hematology should you have any more questions.        Anali Saenz MD  PGY 5, Oncology/Hematology fellow  (P) [phone]  After 5pm, please contact on-call team.

## 2020-09-30 NOTE — PROGRESS NOTE ADULT - ASSESSMENT
35 year old male with hx of HTN, HLD, DM, HFref 2/2  non ischemic dilated cardiomyopathy was admitted at [facility] for HFrEF exacerbation due to medication non-compliance. Now transferred from [facility] for AICD placement. However, procedure on hold due to concern for erythrocytosis in setting of primary vs secondary polycythemia. Epo level on lower end of normal; Per heme, now awaiting ARMANDO 2.

## 2020-09-30 NOTE — PROGRESS NOTE ADULT - ASSESSMENT
This is a 35 year old man with a pmhx of HFrEF (30-35%), most recent EF ~15%,  NICM, HTN, T2DM, and HLD who present to an OSH for nausea and emesis for 4 days and course was c/b HTN emergency in the setting of HFrEF from medication noncompliant and increased his hydralazine medication to 75mg po q8h. Patient underwent an ischemic evaluation and cardiac computed tomography angiography was negative per OSH record and TTE revealed worsening EF (<15%).  Patient was transferred to [facility] for a AICD for a AICD . HFrEF (<15%) plan for AICD on 9/23/2020.    - Replete electrolytes for K<4 and Mg<2  - Continue Guideline Directed Management and Therapy, can lower Lasix to 40mg daily for discharge, emphasized medication adherence    - Continue management per Heme and continue all other management per primary team   - AICD implantation pending Hematology clearance (work ups takes up to 2 weeks)  - follow-up with Dr. Lu as outpatient in 3 weeks on 10/30/2020 at 9am given  -Stable from EP standpoint for discharge              This is a 35 year old man with a pmhx of HFrEF (30-35%), most recent EF ~15%,  NICM, HTN, T2DM, and HLD who present to an OSH for nausea and emesis for 4 days and course was c/b HTN emergency in the setting of HFrEF from medication noncompliant and increased his hydralazine medication to 75mg po q8h. Patient underwent an ischemic evaluation and cardiac computed tomography angiography was negative per OSH record and TTE revealed worsening EF (<15%).  Patient was transferred to [facility] for a AICD for a AICD . HFrEF (<15%) plan for AICD on 9/23/2020.    - Replete electrolytes for K<4 and Mg<2  - Continue Guideline Directed Management and Therapy, can lower Lasix to 40mg daily for discharge, emphasized medication adherence    - Continue management per Heme and continue all other management per primary team   - AICD implantation pending Hematology clearance (work ups takes up to 2 weeks)  - follow-up with Dr. Lu as outpatient in 3 weeks on 10/30/2020 at 9am given

## 2020-09-30 NOTE — DISCHARGE NOTE PROVIDER - NSDCCPCAREPLAN_GEN_ALL_CORE_FT
PRINCIPAL DISCHARGE DIAGNOSIS  Diagnosis: Heart failure with reduced ejection fraction  Assessment and Plan of Treatment: You had an echocardiogram which showed Ejection fraction 15%. You were tranferred to [facility] for an AICD placement. This procedure was not able to be performed due to elevated blood counts. You will follow up with EP and have this procedure done on as an outpatient on 10/30/2020 at 9am.      SECONDARY DISCHARGE DIAGNOSES  Diagnosis: Erythrocytosis  Assessment and Plan of Treatment: Your blood counts were found to be elevated during your hospital stay. Follow up with hematology within 1-2 weeks of discharge. Your JAK2 results are pending, please follow up with hematology.    Diagnosis: HLD (hyperlipidemia)  Assessment and Plan of Treatment: Continue with medications as prescribed. Follow up with your primary care physician for further monitoring in 1-2 weeks. Please call to arrange appointment.      Diagnosis: HTN (hypertension)  Assessment and Plan of Treatment: Continue with medications as prescribed. Follow up with your primary care physician and cardiologist for further monitoring in 1-2 weeks. Please call to arrange appointment.    Diagnosis: Diabetes mellitus  Assessment and Plan of Treatment: Continue low salt, fat and carbohydrate diet, minimize glucose intake.  Exercise daily for at least 30 minutes and weight loss.  Follow up with primary care physician and endocrinologist for routine Hemoglobin A1C checks and management.  Follow up with your ophthalmologist for routine yearly vision exams.       PRINCIPAL DISCHARGE DIAGNOSIS  Diagnosis: Heart failure with reduced ejection fraction  Assessment and Plan of Treatment: You had an echocardiogram which showed Ejection fraction 15%. You were tranferred to [facility] for an AICD placement. This procedure was performed on 10/1/2020.      SECONDARY DISCHARGE DIAGNOSES  Diagnosis: Erythrocytosis  Assessment and Plan of Treatment: Your blood counts were found to be elevated during your hospital stay. Follow up with hematology within 1-2 weeks of discharge. Your JAK2 results were negative.    Diagnosis: HLD (hyperlipidemia)  Assessment and Plan of Treatment: Continue with medications as prescribed. Follow up with your primary care physician for further monitoring in 1-2 weeks. Please call to arrange appointment.      Diagnosis: HTN (hypertension)  Assessment and Plan of Treatment: Continue with medications as prescribed. Follow up with your primary care physician and cardiologist for further monitoring in 1-2 weeks. Please call to arrange appointment.    Diagnosis: Diabetes mellitus  Assessment and Plan of Treatment: Continue low salt, fat and carbohydrate diet, minimize glucose intake.  Exercise daily for at least 30 minutes and weight loss.  Follow up with primary care physician and endocrinologist for routine Hemoglobin A1C checks and management.  Follow up with your ophthalmologist for routine yearly vision exams.       PRINCIPAL DISCHARGE DIAGNOSIS  Diagnosis: Heart failure with reduced ejection fraction  Assessment and Plan of Treatment: You had an echocardiogram which showed Ejection fraction 15%. You were tranferred to [facility] for an AICD placement. This procedure was performed on 10/1/2020. Following your device placement, your ejection fraction improved to 39%. You do not have a cardiologist you follow with. It is important to see a cardiologist regularly for your heart failure. Please follow up with Dr. Alexander at your scheduled appointment time. Follow up with electophysiology Dr. Lu for your device check as scheduled.      SECONDARY DISCHARGE DIAGNOSES  Diagnosis: HLD (hyperlipidemia)  Assessment and Plan of Treatment: Continue with medications as prescribed. Follow up with your primary care physician for further monitoring in 1-2 weeks. Please call to arrange appointment.      Diagnosis: HTN (hypertension)  Assessment and Plan of Treatment: Continue with medications as prescribed. Follow up with your primary care physician and cardiologist for further monitoring in 1-2 weeks. Please call to arrange appointment.    Diagnosis: Diabetes mellitus  Assessment and Plan of Treatment: Continue your medication regimen and a consistent carbohydrate diet (Meaning eating the same amount of carbohydrates at the same time each day). Monitor blood glucose levels throughout the day before meals and at bedtime. Record blood sugars and bring to outpatient providers appointment in order to be reviewed by your doctor for management modifications. If your sugars are more than 400 or less than 70 you should contact your PCP immediately. Monitor for signs/symptoms of low blood glucose, such as, dizziness, altered mental status, or cool/clammy skin. In addition, monitor for signs/symptoms of high blood glucose, such as, feeling hot, dry, fatigued, or with increased thirst/urination. Make regular podiatry appointments in order to have feet checked for wounds and uncontrolled toe nail growth to prevent infections, as well as, appointments with an ophthalmologist to monitor your vision.       Diagnosis: Erythrocytosis  Assessment and Plan of Treatment: Your blood counts were found to be elevated during your hospital stay. Follow up with hematology within 1-2 weeks of discharge. Your JAK2 results were negative. The information to the [facility] ([facility]) has been provided.

## 2020-09-30 NOTE — DIETITIAN INITIAL EVALUATION ADULT. - ADD RECOMMEND
1- Continue current diet order, which remains appropriate at this time. 2- Monitor weights, labs, BM's, skin integrity, p.o. intake. 3- Please Encourage po intake, assist with meals and menu selections, provide alternatives PRN. 4- Recommend outpatient follow up with appropriate RD for purposes of longterm nutrition evaluation. 5- RD remains available, re-consult as needed.

## 2020-09-30 NOTE — DISCHARGE NOTE PROVIDER - NSDCFUSCHEDAPPT_GEN_ALL_CORE_FT
SEAN PEACOCK ; 10/13/2020 ; [facility] [address]  SEAN PEACOCK ; 10/30/2020 ; [facility] [address] SEAN PEACOCK ; 10/12/2020 ; [facility] [address]  SEAN PEACOCK ; 10/13/2020 ; [facility] [address]  SEAN PEACOCK ; 10/30/2020 ; [facility] [address] SEAN PEACOCK ; 10/12/2020 ; [facility] [address] SEAN Collins ; 10/13/2020 ; [facility] [address] SEAN PEACOCK ; 10/26/2020 ; [facility]  SEAN PEACOCK ; 11/12/2020 ; [facility] [address] SEAN Collins ; 12/29/2020 ; [facility] [address]

## 2020-09-30 NOTE — DISCHARGE NOTE PROVIDER - PROVIDER TOKENS
FREE:[LAST:[Please follow up with PCP 1-2 weeks after discharge.],PHONE:[(   )    -],FAX:[(   )    -]] PROVIDER:[TOKEN:[[id number]]],PROVIDER:[TOKEN:[[id number]]]

## 2020-09-30 NOTE — DISCHARGE NOTE PROVIDER - CARE PROVIDER_API CALL
Please follow up with PCP 1-2 weeks after discharge.,   Phone: (   )    -  Fax: (   )    -  Follow Up Time:    Geovani Lu  CARDIAC ELECTROPHYSIOLOGY  [address] [place] [address], [address] [address]  [place] [address]  Phone: [phone]  Fax: [phone]  Follow Up Time:     Marcos Alexander  ADV HEART FAIL TRNSPLNT CARDIO  [address] [place] [address]  Phone: [phone]  Fax: [phone]  Follow Up Time:

## 2020-09-30 NOTE — DIETITIAN INITIAL EVALUATION ADULT. - PERTINENT MEDS FT
MEDICATIONS  (STANDING):  ascorbic acid 500 milliGRAM(s) Oral daily  aspirin  chewable 81 milliGRAM(s) Oral daily  atorvastatin 40 milliGRAM(s) Oral at bedtime  dextrose 5%. 1000 milliLiter(s) (50 mL/Hr) IV Continuous <Continuous>  dextrose 50% Injectable 12.5 Gram(s) IV Push once  dextrose 50% Injectable 25 Gram(s) IV Push once  dextrose 50% Injectable 25 Gram(s) IV Push once  folic acid 1 milliGRAM(s) Oral daily  furosemide    Tablet 40 milliGRAM(s) Oral two times a day  hydrALAZINE 75 milliGRAM(s) Oral every 8 hours  insulin lispro (HumaLOG) corrective regimen sliding scale   SubCutaneous three times a day before meals  insulin lispro (HumaLOG) corrective regimen sliding scale   SubCutaneous at bedtime  isosorbide   mononitrate ER Tablet (IMDUR) 30 milliGRAM(s) Oral daily  lisinopril 40 milliGRAM(s) Oral daily  metoprolol succinate ER 50 milliGRAM(s) Oral daily  pantoprazole    Tablet 40 milliGRAM(s) Oral before breakfast  thiamine 100 milliGRAM(s) Oral daily

## 2020-10-01 LAB
ANION GAP SERPL CALC-SCNC: 15 MMO/L — HIGH (ref 7–14)
BASOPHILS # BLD AUTO: 0.09 K/UL — SIGNIFICANT CHANGE UP (ref 0–0.2)
BASOPHILS NFR BLD AUTO: 1.4 % — SIGNIFICANT CHANGE UP (ref 0–2)
BUN SERPL-MCNC: 41 MG/DL — HIGH (ref 7–23)
CALCIUM SERPL-MCNC: 10.2 MG/DL — SIGNIFICANT CHANGE UP (ref 8.4–10.5)
CHLORIDE SERPL-SCNC: 97 MMOL/L — LOW (ref 98–107)
CO2 SERPL-SCNC: 24 MMOL/L — SIGNIFICANT CHANGE UP (ref 22–31)
CREAT SERPL-MCNC: 1.52 MG/DL — HIGH (ref 0.5–1.3)
EOSINOPHIL # BLD AUTO: 0.16 K/UL — SIGNIFICANT CHANGE UP (ref 0–0.5)
EOSINOPHIL NFR BLD AUTO: 2.5 % — SIGNIFICANT CHANGE UP (ref 0–6)
GLUCOSE SERPL-MCNC: 59 MG/DL — LOW (ref 70–99)
HCT VFR BLD CALC: 60.4 % — CRITICAL HIGH (ref 39–50)
HGB BLD-MCNC: 18.3 G/DL — HIGH (ref 13–17)
IMM GRANULOCYTES NFR BLD AUTO: 0.2 % — SIGNIFICANT CHANGE UP (ref 0–1.5)
LYMPHOCYTES # BLD AUTO: 1.89 K/UL — SIGNIFICANT CHANGE UP (ref 1–3.3)
LYMPHOCYTES # BLD AUTO: 30.1 % — SIGNIFICANT CHANGE UP (ref 13–44)
MCHC RBC-ENTMCNC: 25.3 PG — LOW (ref 27–34)
MCHC RBC-ENTMCNC: 30.3 % — LOW (ref 32–36)
MCV RBC AUTO: 83.4 FL — SIGNIFICANT CHANGE UP (ref 80–100)
MONOCYTES # BLD AUTO: 0.64 K/UL — SIGNIFICANT CHANGE UP (ref 0–0.9)
MONOCYTES NFR BLD AUTO: 10.2 % — SIGNIFICANT CHANGE UP (ref 2–14)
NEUTROPHILS # BLD AUTO: 3.49 K/UL — SIGNIFICANT CHANGE UP (ref 1.8–7.4)
NEUTROPHILS NFR BLD AUTO: 55.6 % — SIGNIFICANT CHANGE UP (ref 43–77)
NRBC # FLD: 0 K/UL — SIGNIFICANT CHANGE UP (ref 0–0)
PLATELET # BLD AUTO: 211 K/UL — SIGNIFICANT CHANGE UP (ref 150–400)
PMV BLD: 12.5 FL — SIGNIFICANT CHANGE UP (ref 7–13)
POTASSIUM SERPL-MCNC: 4.2 MMOL/L — SIGNIFICANT CHANGE UP (ref 3.5–5.3)
POTASSIUM SERPL-SCNC: 4.2 MMOL/L — SIGNIFICANT CHANGE UP (ref 3.5–5.3)
RBC # BLD: 7.24 M/UL — HIGH (ref 4.2–5.8)
RBC # FLD: 16.8 % — HIGH (ref 10.3–14.5)
SODIUM SERPL-SCNC: 136 MMOL/L — SIGNIFICANT CHANGE UP (ref 135–145)
WBC # BLD: 6.28 K/UL — SIGNIFICANT CHANGE UP (ref 3.8–10.5)
WBC # FLD AUTO: 6.28 K/UL — SIGNIFICANT CHANGE UP (ref 3.8–10.5)

## 2020-10-01 PROCEDURE — 33225 L VENTRIC PACING LEAD ADD-ON: CPT

## 2020-10-01 PROCEDURE — 33249 INSJ/RPLCMT DEFIB W/LEAD(S): CPT | Mod: 59

## 2020-10-01 PROCEDURE — 71045 X-RAY EXAM CHEST 1 VIEW: CPT | Mod: 26

## 2020-10-01 PROCEDURE — 99233 SBSQ HOSP IP/OBS HIGH 50: CPT

## 2020-10-01 PROCEDURE — 93010 ELECTROCARDIOGRAM REPORT: CPT | Mod: 59

## 2020-10-01 RX ORDER — PHENYLEPHRINE HYDROCHLORIDE 10 MG/ML
0.4 INJECTION INTRAVENOUS
Qty: 40 | Refills: 0 | Status: DISCONTINUED | OUTPATIENT
Start: 2020-10-01 | End: 2020-10-02

## 2020-10-01 RX ORDER — VANCOMYCIN HCL 1 G
1000 VIAL (EA) INTRAVENOUS ONCE
Refills: 0 | Status: COMPLETED | OUTPATIENT
Start: 2020-10-02 | End: 2020-10-02

## 2020-10-01 RX ORDER — ONDANSETRON 8 MG/1
4 TABLET, FILM COATED ORAL ONCE
Refills: 0 | Status: DISCONTINUED | OUTPATIENT
Start: 2020-10-01 | End: 2020-10-02

## 2020-10-01 RX ORDER — DEXTROSE 50 % IN WATER 50 %
25 SYRINGE (ML) INTRAVENOUS ONCE
Refills: 0 | Status: COMPLETED | OUTPATIENT
Start: 2020-10-01 | End: 2020-10-01

## 2020-10-01 RX ORDER — HYDROMORPHONE HYDROCHLORIDE 2 MG/ML
0.5 INJECTION INTRAMUSCULAR; INTRAVENOUS; SUBCUTANEOUS
Refills: 0 | Status: DISCONTINUED | OUTPATIENT
Start: 2020-10-01 | End: 2020-10-02

## 2020-10-01 RX ORDER — DEXTROSE 50 % IN WATER 50 %
12.5 SYRINGE (ML) INTRAVENOUS ONCE
Refills: 0 | Status: COMPLETED | OUTPATIENT
Start: 2020-10-01 | End: 2020-10-01

## 2020-10-01 RX ORDER — FENTANYL CITRATE 50 UG/ML
25 INJECTION INTRAVENOUS
Refills: 0 | Status: DISCONTINUED | OUTPATIENT
Start: 2020-10-01 | End: 2020-10-02

## 2020-10-01 RX ORDER — FENTANYL CITRATE 50 UG/ML
50 INJECTION INTRAVENOUS
Refills: 0 | Status: DISCONTINUED | OUTPATIENT
Start: 2020-10-01 | End: 2020-10-02

## 2020-10-01 RX ADMIN — Medication 12.5 GRAM(S): at 07:19

## 2020-10-01 RX ADMIN — Medication 1 MILLIGRAM(S): at 12:05

## 2020-10-01 RX ADMIN — Medication 25 MILLILITER(S): at 16:31

## 2020-10-01 RX ADMIN — Medication 75 MILLIGRAM(S): at 05:19

## 2020-10-01 RX ADMIN — Medication 81 MILLIGRAM(S): at 12:05

## 2020-10-01 RX ADMIN — Medication 500 MILLIGRAM(S): at 12:05

## 2020-10-01 RX ADMIN — Medication 12.5 GRAM(S): at 12:43

## 2020-10-01 RX ADMIN — PANTOPRAZOLE SODIUM 40 MILLIGRAM(S): 20 TABLET, DELAYED RELEASE ORAL at 05:19

## 2020-10-01 RX ADMIN — Medication 40 MILLIGRAM(S): at 05:19

## 2020-10-01 RX ADMIN — Medication 100 MILLIGRAM(S): at 12:05

## 2020-10-01 RX ADMIN — LISINOPRIL 40 MILLIGRAM(S): 2.5 TABLET ORAL at 05:19

## 2020-10-01 RX ADMIN — ISOSORBIDE MONONITRATE 30 MILLIGRAM(S): 60 TABLET, EXTENDED RELEASE ORAL at 12:06

## 2020-10-01 RX ADMIN — Medication 50 MILLIGRAM(S): at 12:06

## 2020-10-01 RX ADMIN — Medication 75 MILLIGRAM(S): at 15:01

## 2020-10-01 NOTE — PROGRESS NOTE ADULT - NSHPATTENDINGPLANDISCUSS_GEN_ALL_CORE
patient
Pt
Pt in [place] with [employer]  Rama MIRZA, present at bedside
Pt with [employer]  YUKI Ontiveros
Pt

## 2020-10-01 NOTE — CHART NOTE - NSCHARTNOTEFT_GEN_A_CORE
Type of Procedure: Implantation of  BiV ICD  Licensed independent practitioner: Geovani Lu MD  Assistant: none  Description of procedure: sterile conditions, antibiotic coverage, subclavian venous access, ventricular, atrial and left ventricular  lead, prepectoral pocket copiously irrigated with antibiotic solution and closed in 3 layers.  A technical representative was present to help operate a sophisticated .   Findings of procedure: normal pacing, sensing and lead integrity  Estimated blood loss: < 10 cc  Specimen removed: none  Preoperative Dx: chronic systolic congestive heart failure; bundle branch block  Postoperative Dx: chronic systolic congestive heart failure; bundle branch block  Complications: none  Anesthesia type: local with sedation  No heparin for 24 hours and then reassess.  NYHA Class III    Geovani Lu MD.

## 2020-10-01 NOTE — PROGRESS NOTE ADULT - PROBLEM SELECTOR PLAN 2
-patient with polycythemia, initially suspected secondary polycythemia given obesity and likely low renal perfusion from low EF; however, epo level is normal x2, raising concern for PV. Pt currently w/ no signs or symptoms/sequela of hyperviscosity   -ARMANDO 2 neg, outpt Heme follow up  -c/w ASA for now   -hematology following

## 2020-10-01 NOTE — PROVIDER CONTACT NOTE (OTHER) - ASSESSMENT
Patient is Aox4, able to make his needs known. Patient denies any shakiness, and fatigue. Patient is laying in the bed calmly, No signs and symptoms of distress noted.

## 2020-10-01 NOTE — CONSULT NOTE ADULT - SUBJECTIVE AND OBJECTIVE BOX
CC:  Patient is a 35y old  Male who presents with a chief complaint of AICD placement (01 Oct 2020 15:22)    HPI:  35 year old male with hx of HTN, HLD, DM, heart failure, non ischemic dilated cardiomyopathy per cardiac CT was sent from [facility] for AICD placement. Patient went to [facility] for N/V, abdominal pain for 3 days. N/V occurs after eating, mostly food, non-bilious, non-bloody. He had CT abdomen done which showed constipation. He was found to be hypertensive and was admitted to [facility] for hypertensive emergency in setting of HFrEF exacerbation from medication noncompliance. He had a recent ischemic evaluation which was negative for CAD. His most recent echo on 9/15/2020 showed EF of <15%. He was sent here for AICD evaluation. Denies abdominal pain, N/V/D, chest pain, palpitation, SOB.   Per patient, he was diagnosed with heart failure 1 year ago but does not follow up with a cardiologist. He stated that he takes his medications from "[facility]"  and has only been on medications for the last 3 months because he cannot afford it.  ***[facility]  ID #[id number]*** (22 Sep 2020 15:17)  Today, patient went for ICD placement and while in PACU started on phenylephrine drip. CCU consult requested for hypotension and patient on IV pressor.    Allergies  No Known Allergies  Intolerances    	  MEDICATIONS  aspirin  chewable 81 milliGRAM(s) Oral daily  furosemide    Tablet 40 milliGRAM(s) Oral daily  hydrALAZINE 75 milliGRAM(s) Oral every 8 hours  isosorbide   mononitrate ER Tablet (IMDUR) 30 milliGRAM(s) Oral daily  lisinopril 40 milliGRAM(s) Oral daily  metoprolol succinate ER 50 milliGRAM(s) Oral daily  phenylephrine    Infusion 0.4 MICROgram(s)/kG/Min IV Continuous <Continuous>  acetaminophen   Tablet .. 650 milliGRAM(s) Oral every 6 hours PRN  fentaNYL    Injectable 25 MICROGram(s) IV Push every 5 minutes PRN  fentaNYL    Injectable 50 MICROGram(s) IV Push every 5 minutes PRN  HYDROmorphone  Injectable 0.5 milliGRAM(s) IV Push every 10 minutes PRN  ondansetron Injectable 4 milliGRAM(s) IV Push once PRN  pantoprazole    Tablet 40 milliGRAM(s) Oral before breakfast  atorvastatin 40 milliGRAM(s) Oral at bedtime  dextrose 40% Gel 15 Gram(s) Oral once PRN  dextrose 50% Injectable 12.5 Gram(s) IV Push once  dextrose 50% Injectable 25 Gram(s) IV Push once  dextrose 50% Injectable 25 Gram(s) IV Push once  glucagon  Injectable 1 milliGRAM(s) IntraMuscular once PRN  insulin lispro (HumaLOG) corrective regimen sliding scale   SubCutaneous three times a day before meals  insulin lispro (HumaLOG) corrective regimen sliding scale   SubCutaneous at bedtime  ascorbic acid 500 milliGRAM(s) Oral daily  dextrose 5%. 1000 milliLiter(s) IV Continuous <Continuous>  folic acid 1 milliGRAM(s) Oral daily  thiamine 100 milliGRAM(s) Oral daily    PAST MEDICAL & SURGICAL HISTORY:  Heart failure with reduced ejection fraction  DM (diabetes mellitus)  HLD (hyperlipidemia)  HTN (hypertension)        FAMILY HISTORY: Mother has hypertension and     SOCIAL HISTORY    Marital Status:   Occupation:   Lives with:     SUBSTANCE USE  Tobacco Usage:  (X) None ( ) never smoked   ( ) former smoker  ( ) current smoker; Packs per day:   Alcohol Usage: (X) none  ( ) occasional ( ) 2-3 times a week ( ) daily; Last drink:   Recreational drugs (X) None    CONSTITUTIONAL: No fevers, No chills, No fatigue, No weight gain  EYES: No vision changes   ENT: No congestion, No ear pain, No sore throat.  NECK: No pain, No stiffness  RESPIRATORY: No shortness of breath, No cough, No wheezing, No hemoptysis  CARDIOVASCULAR: No chest pain. No palpitations, No CHAUDHARI, No orthopnea, No paroxysmal nocturnal dyspnea, No pleuritic pain  GASTROINTESTINAL: No abdominal pain, No nausea, No vomiting, No hematemesis, No diarrhea No constipation. No melena  GENITOURINARY: No dysuria, No frequency, No incontinence, No hematuria  NEUROLOGICAL: +slight dizziness from the morning which is not new, No lightheadedness, No syncope, No LOC, No headache, No numbness or weakness  MUSCULOSKELETAL: No Edema, No joint pain, No joint swelling.  PSYCHIATRIC: No anxiety, No depression  DERMATOLOGY: No diaphoresis. No itching, No rashes, No pressure ulcers  HEME/LYMPH: No easy bruising, or bleeding gums    All other review of systems is negative unless indicated above.    VITAL SIGNS  T(C): 36.9 (10-01-20 @ 23:00), Max: 37 (10-01-20 @ 14:56)  HR: 67 (10-01-20 @ 23:45) (57 - 83)  BP: 85/55 (10-01-20 @ 23:45) (79/44 - 116/74)  RR: 16 (10-01-20 @ 23:35) (12 - 21)  SpO2: 100% (10-01-20 @ 23:45) (98% - 100%)  Wt(kg): --    Appearance: NAD, no distress  HEENT: Moist Mucous Membranes, Anicteric, PERRL, EOMI  Cardiovascular: Regular rate and rhythm, Normal S1 S2, No JVD, No murmurs  Respiratory: Lungs clear to auscultation. No rales, No rhonchi, No wheezing. No tenderness to palpation  Gastrointestinal:  Soft, Non-tender, + BS  Neurologic: Non-focal, A&Ox3  Skin: Warm and dry, No rashes, No ecchymosis, No cyanosis  Musculoskeletal: No clubbing, No cyanosis, No edema  Vascular: Peripheral pulses palpable 2+ bilaterally  Psychiatry: Mood & affect appropriate      I&O's Summary    30 Sep 2020 07:  -  01 Oct 2020 07:00  --------------------------------------------------------  IN: 460 mL / OUT: 1180 mL / NET: -720 mL    01 Oct 2020 07:  -  01 Oct 2020 23:56  --------------------------------------------------------  IN: 25 mL / OUT: 550 mL / NET: -525 mL        LABORATORY VALUES	 	                          18.3   6.28  )-----------( 211      ( 01 Oct 2020 06:20 )             60.4       10    136  |  97<L>  |  41<H>  ----------------------------<  59<L>  4.2   |  24  |  1.52<H>      134<L>  |  96<L>  |  39<H>  ----------------------------<  70  3.7   |  20<L>  |  1.64<H>    Ca    10.2      01 Oct 2020 06:20  Ca    10.0      30 Sep 2020 07:00  Mg     1.9             CAPILLARY BLOOD GLUCOSE    POCT Blood Glucose.: 117 mg/dL (01 Oct 2020 23:05)  	    ECG:  V-paced rhythm with occasional intrinsic rhythm

## 2020-10-01 NOTE — PROVIDER CONTACT NOTE (CRITICAL VALUE NOTIFICATION) - BACKGROUND
pt having workup for polycythemia, still pending JAK2 results for AICD placement clearance
Erythrocytosis and CHF
Patient admitted for heart failure due to medication non-compliance. Past medical history of HTN, HLD, and DM.
Pt A&Ox4. Awaiting AICD placement. Plan to continue to monitor HCT
Pt has PMHx of HTN, HLD, DM, HF with reduced EF of <15% and ischemic dilated cardiomyopathy. Current medical hx of erythrocytosis and DVT prophylaxis.
Pt. H&H trending high & PMH of Erythrocytosis.
Pt. admitted for heart failure.
Pt. admitted for heart failure.

## 2020-10-01 NOTE — PROGRESS NOTE ADULT - SUBJECTIVE AND OBJECTIVE BOX
[facility] Division of Hospital Medicine  Buster Dutton MD  Pager [phone]      Patient is a 35y old  Male who presents with a chief complaint of AICD placement (30 Sep 2020 14:56)      SUBJECTIVE / OVERNIGHT EVENTS: No CP or SOB    MEDICATIONS  (STANDING):  ascorbic acid 500 milliGRAM(s) Oral daily  aspirin  chewable 81 milliGRAM(s) Oral daily  atorvastatin 40 milliGRAM(s) Oral at bedtime  dextrose 5%. 1000 milliLiter(s) (50 mL/Hr) IV Continuous <Continuous>  dextrose 50% Injectable 12.5 Gram(s) IV Push once  dextrose 50% Injectable 25 Gram(s) IV Push once  dextrose 50% Injectable 25 Gram(s) IV Push once  folic acid 1 milliGRAM(s) Oral daily  furosemide    Tablet 40 milliGRAM(s) Oral daily  hydrALAZINE 75 milliGRAM(s) Oral every 8 hours  insulin lispro (HumaLOG) corrective regimen sliding scale   SubCutaneous three times a day before meals  insulin lispro (HumaLOG) corrective regimen sliding scale   SubCutaneous at bedtime  isosorbide   mononitrate ER Tablet (IMDUR) 30 milliGRAM(s) Oral daily  lisinopril 40 milliGRAM(s) Oral daily  metoprolol succinate ER 50 milliGRAM(s) Oral daily  pantoprazole    Tablet 40 milliGRAM(s) Oral before breakfast  thiamine 100 milliGRAM(s) Oral daily    MEDICATIONS  (PRN):  acetaminophen   Tablet .. 650 milliGRAM(s) Oral every 6 hours PRN Temp greater or equal to 38C (100.4F), Mild Pain (1 - 3)  dextrose 40% Gel 15 Gram(s) Oral once PRN Blood Glucose LESS THAN 70 milliGRAM(s)/deciliter  glucagon  Injectable 1 milliGRAM(s) IntraMuscular once PRN Glucose LESS THAN 70 milligrams/deciliter      CAPILLARY BLOOD GLUCOSE      POCT Blood Glucose.: 78 mg/dL (01 Oct 2020 15:21)  POCT Blood Glucose.: 76 mg/dL (01 Oct 2020 12:22)  POCT Blood Glucose.: 95 mg/dL (01 Oct 2020 08:23)  POCT Blood Glucose.: 77 mg/dL (01 Oct 2020 06:33)  POCT Blood Glucose.: 77 mg/dL (01 Oct 2020 06:27)  POCT Blood Glucose.: 146 mg/dL (30 Sep 2020 22:27)  POCT Blood Glucose.: 119 mg/dL (30 Sep 2020 17:32)    I&O's Summary    30 Sep 2020 07:01  -  01 Oct 2020 07:00  --------------------------------------------------------  IN: 460 mL / OUT: 1180 mL / NET: -720 mL    01 Oct 2020 07:01  -  01 Oct 2020 15:23  --------------------------------------------------------  IN: 25 mL / OUT: 550 mL / NET: -525 mL        PHYSICAL EXAM:  Vital Signs Last 24 Hrs  T(C): 37 (01 Oct 2020 14:56), Max: 37 (01 Oct 2020 14:56)  T(F): 98.6 (01 Oct 2020 14:56), Max: 98.6 (01 Oct 2020 14:56)  HR: 74 (01 Oct 2020 14:56) (65 - 75)  BP: 99/67 (01 Oct 2020 14:56) (99/67 - 116/74)  BP(mean): --  RR: 18 (01 Oct 2020 14:56) (18 - 19)  SpO2: 98% (01 Oct 2020 14:56) (98% - 99%)  CONSTITUTIONAL: NAD  EYES: conjunctiva and sclera clear  ENMT: mmm  NECK: Supple,  RESPIRATORY: Normal respiratory effort; lungs are clear to auscultation bilaterally  CARDIOVASCULAR: Regular rate and rhythm, + S1 and S2  ABDOMEN: Nontender to palpation, normoactive bowel sounds, no rebound/guarding  MUSCULOSKELETAL:  no clubbing or cyanosis of digits;   PSYCH: A+O x 3  NEUROLOGY: no gross deficits   SKIN: No rashes;     LABS:                        18.3   6.28  )-----------( 211      ( 01 Oct 2020 06:20 )             60.4     10-01    136  |  97<L>  |  41<H>  ----------------------------<  59<L>  4.2   |  24  |  1.52<H>    Ca    10.2      01 Oct 2020 06:20  Mg     1.9     09-30

## 2020-10-01 NOTE — CHART NOTE - NSCHARTNOTEFT_GEN_A_CORE
ID:  [id number]    NPO for Bi-V ICD implantation today.  Discussed with Dr. Lu, patient will benefit from cardiac resynchronization therapy given his wide -130ms seen on EKG and NYHA class III.  Assessment see preprocedure record.  Post implantation instructions initiated via .     Heme clearance note appreciated, no specific intervention for erythrocytosis noted.

## 2020-10-01 NOTE — CONSULT NOTE ADULT - ASSESSMENT
35 year old male with hx of HTN, HLD, DM, HFrEF (EF<15%), non ischemic dilated cardiomyopathy sent from [facility] for AICD placement. Patient went for ICD placement today and while in PACU started on phenylephrine drip for hypotension. CCU consult requested for further management of hypotension.       #Hypotension - s/p BIV-ICD placement, patient received midazolam, fentanyl, precedex during the procedure. Placed on phenylephrine drip for hypotension while in PACU. BP rechecked off phenylephrine drip. SBP~80's-90's and map~ 60's. Asymptomatic. Patient's baseline SBP pre-procedure was 90's to 110's, EKG shows Vpaced rhythm with intermittent intrinsic rhythm.  -Continue tele monitoring  -Patient off IV pressor and not requiring CCU care at present time  -Please re-consult if clinical status change or patient becomes symptomatic.  -Echo in am  -Call [phone] if any further questions.      Case discussed with Dr. Geovani Lu, EP attending

## 2020-10-01 NOTE — CONSULT NOTE ADULT - ATTENDING COMMENTS
35 year old male with hx of HTN, HLD, DM, HFrEF (EF<15%), non ischemic dilated cardiomyopathy sent from [facility] for AICD placement. Patient went for ICD placement today and while in PACU started on phenylephrine drip for hypotension. CCU consult requested for further management of hypotension.       #Hypotension - s/p BIV-ICD placement, patient received midazolam, fentanyl, precedex during the procedure. Placed on phenylephrine drip for hypotension while in PACU. BP rechecked off phenylephrine drip. SBP~80's-90's and map~ 60's. Asymptomatic. Patient's baseline SBP pre-procedure was 90's to 110's, EKG shows Vpaced rhythm with intermittent intrinsic rhythm.  -Continue tele monitoring  -Patient off IV pressor and not requiring CCU care at present time  -Please re-consult if clinical status change or patient becomes symptomatic.  -Echo in am
Please hold off the procedure  will follow up with EPO and Jak2 result to see if it is PV vs secondary erythrocytosis.
This is a 35 year old man with a pmhx of HFrEF (30-35%), NICM, HTN, T2DM, and HLD who present to [facility] for nausea and emesis for 4 days and course was c/b HTN emergency in the setting of HFrEF from medication noncompliant and increased his hydralazine medication to 75mg po q8h. Patient underwent an ischemic evaluation and cardiac computed tomography angiography was negative per OSH record and TTE revealed worsening EF (<15%). Patient was transferred to [facility] for a AICD for a AICD for primary prevention given that the patient has a low EF with a NYHA III despite more than 3months of goal directive medical therapy. Patient was consented with a  ([id number]). The risk and benefits for each procedure were explain in detail which included but not limited to bleeding, infection, stroke, cardiac tamponade and death. Patient expressed understanding an all questions were answered.    Plan:  HFrEF (<15%) plan for AICD on 9/23/2020  Continuous telemetric monitor for Afib  Replete electrolytes for K<4 and Mg<2  Covid-19 negative on 9/19/2020 from OSH  Patient was consent for a AICD for primary preventative for a low EF with a NYHA III despite 3months of goal directive medical therapy. . The risk and benefits for each procedure were explain in detail which included but not limited to bleeding, infection, stroke, cardiac tamponade and death. Patient expressed understanding an all questions were answered

## 2020-10-01 NOTE — PROVIDER CONTACT NOTE (OTHER) - BACKGROUND
Pt admitted with CHF exacerbation, PMH of HF w/ reduced ejection fraction, DM, HLD, HTN, EF of <15%. Pt admitted with CHF exacerbation, PMH of HF w/ reduced ejection fraction, DM, HLD, HTN, EF of <15%. Patient is NPO for ICD placement.

## 2020-10-01 NOTE — PROGRESS NOTE ADULT - ASSESSMENT
35 year old male with hx of HTN, HLD, DM, HFref 2/2  non ischemic dilated cardiomyopathy was admitted at [facility] for HFrEF exacerbation due to medication non-compliance. Now transferred from [facility] for AICD placement. However, procedure on hold due to concern for erythrocytosis in setting of primary vs secondary polycythemia. Epo level on lower end of normal; Per heme, now awaiting ARMANDO 2.

## 2020-10-02 LAB
ANION GAP SERPL CALC-SCNC: 15 MMO/L — HIGH (ref 7–14)
ANION GAP SERPL CALC-SCNC: 18 MMO/L — HIGH (ref 7–14)
APPEARANCE UR: SIGNIFICANT CHANGE UP
BACTERIA # UR AUTO: SIGNIFICANT CHANGE UP
BILIRUB UR-MCNC: NEGATIVE — SIGNIFICANT CHANGE UP
BLOOD UR QL VISUAL: NEGATIVE — SIGNIFICANT CHANGE UP
BUN SERPL-MCNC: 50 MG/DL — HIGH (ref 7–23)
BUN SERPL-MCNC: 51 MG/DL — HIGH (ref 7–23)
CALCIUM SERPL-MCNC: 10.2 MG/DL — SIGNIFICANT CHANGE UP (ref 8.4–10.5)
CALCIUM SERPL-MCNC: 9.9 MG/DL — SIGNIFICANT CHANGE UP (ref 8.4–10.5)
CHLORIDE SERPL-SCNC: 93 MMOL/L — LOW (ref 98–107)
CHLORIDE SERPL-SCNC: 94 MMOL/L — LOW (ref 98–107)
CO2 SERPL-SCNC: 21 MMOL/L — LOW (ref 22–31)
CO2 SERPL-SCNC: 25 MMOL/L — SIGNIFICANT CHANGE UP (ref 22–31)
COLOR SPEC: YELLOW — SIGNIFICANT CHANGE UP
CREAT SERPL-MCNC: 2.02 MG/DL — HIGH (ref 0.5–1.3)
CREAT SERPL-MCNC: 2.43 MG/DL — HIGH (ref 0.5–1.3)
GLUCOSE BLDC GLUCOMTR-MCNC: 104 MG/DL — HIGH (ref 70–99)
GLUCOSE BLDC GLUCOMTR-MCNC: 99 MG/DL — SIGNIFICANT CHANGE UP (ref 70–99)
GLUCOSE SERPL-MCNC: 100 MG/DL — HIGH (ref 70–99)
GLUCOSE SERPL-MCNC: 120 MG/DL — HIGH (ref 70–99)
GLUCOSE UR-MCNC: NEGATIVE — SIGNIFICANT CHANGE UP
HCT VFR BLD CALC: 54 % — HIGH (ref 39–50)
HGB BLD-MCNC: 16.7 G/DL — SIGNIFICANT CHANGE UP (ref 13–17)
HYALINE CASTS # UR AUTO: SIGNIFICANT CHANGE UP
KETONES UR-MCNC: NEGATIVE — SIGNIFICANT CHANGE UP
LEUKOCYTE ESTERASE UR-ACNC: SIGNIFICANT CHANGE UP
MAGNESIUM SERPL-MCNC: 1.9 MG/DL — SIGNIFICANT CHANGE UP (ref 1.6–2.6)
MAGNESIUM SERPL-MCNC: 2.1 MG/DL — SIGNIFICANT CHANGE UP (ref 1.6–2.6)
MCHC RBC-ENTMCNC: 24.8 PG — LOW (ref 27–34)
MCHC RBC-ENTMCNC: 30.9 % — LOW (ref 32–36)
MCV RBC AUTO: 80.1 FL — SIGNIFICANT CHANGE UP (ref 80–100)
NITRITE UR-MCNC: NEGATIVE — SIGNIFICANT CHANGE UP
NRBC # FLD: 0 K/UL — SIGNIFICANT CHANGE UP (ref 0–0)
PH UR: 5 — SIGNIFICANT CHANGE UP (ref 5–8)
PHOSPHATE SERPL-MCNC: 4.2 MG/DL — SIGNIFICANT CHANGE UP (ref 2.5–4.5)
PHOSPHATE SERPL-MCNC: 5.2 MG/DL — HIGH (ref 2.5–4.5)
PLATELET # BLD AUTO: 188 K/UL — SIGNIFICANT CHANGE UP (ref 150–400)
PMV BLD: 11.1 FL — SIGNIFICANT CHANGE UP (ref 7–13)
POTASSIUM SERPL-MCNC: 4.5 MMOL/L — SIGNIFICANT CHANGE UP (ref 3.5–5.3)
POTASSIUM SERPL-MCNC: 4.6 MMOL/L — SIGNIFICANT CHANGE UP (ref 3.5–5.3)
POTASSIUM SERPL-SCNC: 4.5 MMOL/L — SIGNIFICANT CHANGE UP (ref 3.5–5.3)
POTASSIUM SERPL-SCNC: 4.6 MMOL/L — SIGNIFICANT CHANGE UP (ref 3.5–5.3)
PROT UR-MCNC: 10 — SIGNIFICANT CHANGE UP
RBC # BLD: 6.74 M/UL — HIGH (ref 4.2–5.8)
RBC # FLD: 15.9 % — HIGH (ref 10.3–14.5)
RBC CASTS # UR COMP ASSIST: SIGNIFICANT CHANGE UP (ref 0–?)
SODIUM SERPL-SCNC: 133 MMOL/L — LOW (ref 135–145)
SODIUM SERPL-SCNC: 133 MMOL/L — LOW (ref 135–145)
SP GR SPEC: 1.01 — SIGNIFICANT CHANGE UP (ref 1–1.04)
SQUAMOUS # UR AUTO: SIGNIFICANT CHANGE UP
UROBILINOGEN FLD QL: NORMAL — SIGNIFICANT CHANGE UP
WBC # BLD: 9.85 K/UL — SIGNIFICANT CHANGE UP (ref 3.8–10.5)
WBC # FLD AUTO: 9.85 K/UL — SIGNIFICANT CHANGE UP (ref 3.8–10.5)
WBC UR QL: >50 — HIGH (ref 0–?)

## 2020-10-02 PROCEDURE — 99232 SBSQ HOSP IP/OBS MODERATE 35: CPT

## 2020-10-02 PROCEDURE — 93306 TTE W/DOPPLER COMPLETE: CPT | Mod: 26

## 2020-10-02 PROCEDURE — 99233 SBSQ HOSP IP/OBS HIGH 50: CPT

## 2020-10-02 RX ADMIN — ATORVASTATIN CALCIUM 40 MILLIGRAM(S): 80 TABLET, FILM COATED ORAL at 00:07

## 2020-10-02 RX ADMIN — Medication 1 MILLIGRAM(S): at 13:03

## 2020-10-02 RX ADMIN — Medication 500 MILLIGRAM(S): at 13:08

## 2020-10-02 RX ADMIN — Medication 81 MILLIGRAM(S): at 13:03

## 2020-10-02 RX ADMIN — Medication 100 MILLIGRAM(S): at 13:04

## 2020-10-02 RX ADMIN — Medication 75 MILLIGRAM(S): at 06:03

## 2020-10-02 RX ADMIN — ATORVASTATIN CALCIUM 40 MILLIGRAM(S): 80 TABLET, FILM COATED ORAL at 21:22

## 2020-10-02 RX ADMIN — PANTOPRAZOLE SODIUM 40 MILLIGRAM(S): 20 TABLET, DELAYED RELEASE ORAL at 06:03

## 2020-10-02 RX ADMIN — ISOSORBIDE MONONITRATE 30 MILLIGRAM(S): 60 TABLET, EXTENDED RELEASE ORAL at 13:04

## 2020-10-02 RX ADMIN — Medication 50 MILLIGRAM(S): at 13:05

## 2020-10-02 RX ADMIN — Medication 75 MILLIGRAM(S): at 13:04

## 2020-10-02 RX ADMIN — Medication 250 MILLIGRAM(S): at 06:08

## 2020-10-02 RX ADMIN — LISINOPRIL 40 MILLIGRAM(S): 2.5 TABLET ORAL at 06:03

## 2020-10-02 RX ADMIN — Medication 75 MILLIGRAM(S): at 21:22

## 2020-10-02 RX ADMIN — PHENYLEPHRINE HYDROCHLORIDE 15.5 MICROGRAM(S)/KG/MIN: 10 INJECTION INTRAVENOUS at 00:08

## 2020-10-02 RX ADMIN — Medication 40 MILLIGRAM(S): at 06:03

## 2020-10-02 NOTE — PROGRESS NOTE ADULT - ATTENDING COMMENTS
This is a 35 year old man with a pmhx of chronic HFrEF (30-35%), NICM, HTN, T2DM, and HLD who present to an OSH for nausea and emesis for 4 days and course was c/b HTN emergency in the setting of HFrEF from medication noncompliant and increased his hydralazine medication to 75mg po q8h. Patient underwent an ischemic evaluation and cardiac computed tomography angiography was negative per OSH record and TTE revealed worsening EF (<15%). Patient was transferred to [facility] for a AICD for a AICD for primary prevention given that the patient has a low EF with a NYHA III despite more than 3months of goal directive medical therapy. Patient was consented with a  ([id number]). The risk and benefits for each procedure were explain in detail which included but not limited to bleeding, infection, stroke, cardiac tamponade and death. Patient expressed understanding an all questions were answered. Hematology consulted for abnormal H/H prior to AICD placement.      Plan:  ADHFrEF 2/2 NICM  - C/w GDMT:  metoprolol 50 QD  Imdur 30   Lisinopril 40 mg QD (monitor Cr)   hydralazine 75 q8  - C/w lasix 40 PO BID.   - Pt does meet requirements for ICD for primary prevention.   - Covid-19 negative on 9/19/2020 from OSH and on 9/22/2020  -Appreciated hematology's consultation, postponed AICD placement for now.  -Will follow up with Hematology, workups for rule out polycythemia vera in progress, EPO level low.   - JAK2 Pending, in lab.   -Plan for ICD implantation after Heme w/u, ideally before d/c.
This is a 35 year old man with a pmhx of chronic HFrEF (30-35%), NICM, HTN, T2DM, and HLD who present to an OSH for nausea and emesis for 4 days and course was c/b HTN emergency in the setting of HFrEF from medication noncompliant and increased his hydralazine medication to 75mg po q8h. Patient underwent an ischemic evaluation and cardiac computed tomography angiography was negative per OSH record and TTE revealed worsening EF (<15%). Patient was transferred to [facility] for a AICD for a AICD for primary prevention given that the patient has a low EF with a NYHA III despite more than 3months of goal directive medical therapy. Patient was consented with a  ([id number]). The risk and benefits for each procedure were explain in detail which included but not limited to bleeding, infection, stroke, cardiac tamponade and death. Patient expressed understanding an all questions were answered. Hematology consulted for abnormal H/H prior to AICD placement.      Plan:  ADHFrEF 2/2 NICM  - C/w GDMT:  metoprolol 50 QD  Imdur 30   Lisinopril 40 mg QD (monitor Cr)   hydralazine 75 q8  - C/w diuresis with PO lasix. Would change to 40 PO BID.   - Pt does meet requirements for ICD for primary prevention.
This is a 35 year old man with a pmhx of chronic HFrEF (30-35%), NICM, HTN, T2DM, and HLD who present to an OSH for nausea and emesis for 4 days and course was c/b HTN emergency in the setting of HFrEF from medication noncompliant and increased his hydralazine medication to 75mg po q8h. Patient underwent an ischemic evaluation and cardiac computed tomography angiography was negative per OSH record and TTE revealed worsening EF (<15%). Patient was transferred to [facility] for a AICD for a AICD for primary prevention given that the patient has a low EF with a NYHA III despite more than 3months of goal directive medical therapy. Patient was consented with a  ([id number]). The risk and benefits for each procedure were explain in detail which included but not limited to bleeding, infection, stroke, cardiac tamponade and death. Patient expressed understanding an all questions were answered. Hematology consulted for abnormal H/H prior to AICD placement.      Plan:    - Covid-19 negative on 9/19/2020 from OSH and on 9/22/2020  - -Appreciated hematology's consultation, postponed AICD placement for now   -Will follow up with Hematology, workups for rule out polycythemia vera in progress   -Continue guideline directed medical therapy for NICM, emphasized meds compliance  -Plan for ICD implantation after clearance from Hema
Mr. Miramontes is a 35 year old man with a pmhx of HFrEF (30-35%), most recent EF ~15%,  NICM, HTN, T2DM, and HLD who present to an OSH for nausea and emesis for 4 days and course was c/b HTN emergency in the setting of HFrEF from medication noncompliant and increased his hydralazine medication to 75mg po q8h. Patient underwent an ischemic evaluation and cardiac computed tomography angiography was negative per OSH record and TTE revealed worsening EF (<15%). Patient was transferred to [facility] for a AICD for a AICD for primary prevention given that the patient has a low EF with a NYHA III despite more than 3months of goal directive medical therapy. Patient was consented with a  ([id number]). Patient was found to have a high H/H and likely has polycythemia vera.  He is currently being evaluated by hematology. The risk and benefits for ICD implantation was explained in detail which included but not limited to bleeding, infection, stroke, cardiac tamponade and death. Patient expressed understanding an all questions were answered.   1) ICD implantation pending Hematology clearance.   2) Continue GDMT as recommended including BB  3) Will need to wait for treatment of polycythemia vera prior to implantation of ICD.  Await JAK2.
This is a 35 year old man with a pmhx of HFrEF (30-35%), most recent EF ~15%,  NICM, HTN, T2DM, and HLD who present to an OSH for nausea and emesis for 4 days and course was c/b HTN emergency in the setting of HFrEF from medication noncompliant and increased his hydralazine medication to 75mg po q8h. Patient underwent an ischemic evaluation and cardiac computed tomography angiography was negative per OSH record and TTE revealed worsening EF (<15%). Patient was transferred to [facility] for a AICD for a AICD for primary prevention given that the patient has a low EF with a NYHA III despite more than 3months of goal directive medical therapy. Patient was consented with a  ([id number]). The risk and benefits for each procedure were explain in detail which included but not limited to bleeding, infection, stroke, cardiac tamponade and death. Patient expressed understanding an all questions were answered.     Plan:  HFrEF (<15%) plan for AICD on 9/23/2020  - Continuous telemetric monitor for Afib  - Replete electrolytes for K<4 and Mg<2  - Covid-19 negative on 9/19/2020 from OSH and on 9/22/2020  - Continue management per Heme and continue all other management per primary team   - AICD implantation pending Hematology clearance, can be discharge from Ep standpoint and should follow-up as outpatient in 3 weeks on 10/30/2020 at 9am  - Continue GDMT as recommended including BB
This is a 35 year old man with a pmhx of HFrEF (30-35%), NICM, HTN, T2DM, and HLD who present to an OSH for nausea and emesis for 4 days and course was c/b HTN emergency in the setting of HFrEF from medication noncompliant and increased his hydralazine medication to 75mg po q8h. Patient underwent an ischemic evaluation and cardiac computed tomography angiography was negative per OSH record and TTE revealed worsening EF (<15%). Patient was transferred to [facility] for a AICD for a AICD for primary prevention given that the patient has a low EF with a NYHA III despite more than 3months of goal directive medical therapy. Patient was consented with a  ([id number]). The risk and benefits for each procedure were explain in detail which included but not limited to bleeding, infection, stroke, cardiac tamponade and death. Patient expressed understanding an all questions were answered. Awaiting Hematology recommendation prior to AICD placement     Plan:  HFrEF (<15%) plan for AICD on 9/23/2020  - Continuous telemetric monitor for Afib  - Replete electrolytes for K<4 and Mg<2  - Covid-19 negative on 9/19/2020 from OSH and on 9/22/2020  - Patient was consent for a AICD for primary preventative for a low EF with a NYHA III despite 3months of goal directive medical therapy . The risk and benefits for each procedure were explain in detail which included but not limited to bleeding, infection, stroke, cardiac tamponade and death. Patient expressed understanding an all questions were answered  - Consult hematology for elevated HCT. Will postponed AICD placement until hematology weights in given increase risk for thrombosis
This is a 35 year old man with a pmhx of HFrEF (30-35%), NICM, HTN, T2DM, and HLD who present to an OSH for nausea and emesis for 4 days and course was c/b HTN emergency in the setting of HFrEF from medication noncompliant and increased his hydralazine medication to 75mg po q8h. Patient underwent an ischemic evaluation and cardiac computed tomography angiography was negative per OSH record and TTE revealed worsening EF (<15%). Patient was transferred to [facility] for a AICD for a AICD for primary prevention given that the patient has a low EF with a NYHA III despite more than 3months of goal directive medical therapy. Patient was consented with a  ([id number]). The risk and benefits for each procedure were explain in detail which included but not limited to bleeding, infection, stroke, cardiac tamponade and death. Patient expressed understanding an all questions were answered. Awaiting Hematology recommendation prior to AICD placement     Plan:  HFrEF (<15%) plan for AICD on 9/23/2020  - Continuous telemetric monitor for Afib  - Replete electrolytes for K<4 and Mg<2  - Covid-19 negative on 9/19/2020 from OSH and on 9/22/2020  - Patient was consent for a AICD for primary preventative for a low EF with a NYHA III despite 3months of goal directive medical therapy . The risk and benefits for each procedure were explain in detail which included but not limited to bleeding, infection, stroke, cardiac tamponade and death. Patient expressed understanding an all questions were answered  - Consult hematology for elevated Hgb/HCT. Will postponed AICD placement until hematology weights in given increase risk for thrombosis
This is a 35 year old man with a pmhx of HFrEF (30-35%), most recent EF ~15%,  NICM, HTN, T2DM, and HLD who present to an OSH for nausea and emesis for 4 days and course was c/b HTN emergency in the setting of HFrEF from medication noncompliant and increased his hydralazine medication to 75mg po q8h. Patient underwent an ischemic evaluation and cardiac computed tomography angiography was negative per OSH record and TTE revealed worsening EF (<15%).  Patient was transferred to [facility] for a AICD for a AICD . HFrEF (<15%) plan for AICD on 9/23/2020.    - Replete electrolytes for K<4 and Mg<2  - Continue Guideline Directed Management and Therapy, can lower Lasix to 40mg daily for discharge, emphasized medication adherence    - Continue management per Heme and continue all other management per primary team   - AICD implantation pending Hematology clearance (work ups takes up to 2 weeks)  - follow-up with Dr. Lu as outpatient in 3 weeks on 10/30/2020 at 9am given    ARMANDO 2 negative.  Hematology gave the go ahead for ICD.  Patient scheduled for tomorrow for ICD implantation.
This is a 35 year old man with a pmhx of HFrEF (30-35%), most recent EF ~15%,  NICM, HTN, T2DM, and HLD who present to an OSH for nausea and emesis for 4 days and course was c/b HTN emergency in the setting of HFrEF from medication noncompliant and increased his hydralazine medication to 75mg po q8h. Patient underwent an ischemic evaluation and cardiac computed tomography angiography was negative per OSH record and TTE revealed worsening EF (<15%).  Patient was transferred to [facility] for a AICD for primary prevention of  SCD from VT/VF.  AICD implant was postponed due to chronic erthrocytosis and ? polycythemia vera workups per Hematology.  Patient was cleared by Hema for ICD on 9/30 and underwent biV ICD ([employer]) implantation on 10/1/2020.  Post ICD teaching provided.  Noted BiV pacing 96% and LRL at 40 bpm.  Repeat echo done this am showed improved LVEF 39%.     - Encourage adequate fluid intake as uptrend BUN/Crt  - Continue Guideline Directed Management and Therapy, follow up cardiology  - Follow up for AICD wound check on 10/13 at 14:15  - Interrogation done by [employer] rep which showed normal device functions with excellent threshold capture, no reprogram done, BiV paced 96%  -Possible d/c home tomorrow if scr improves, suspect due to hypovolemia.  Hold diuretics
rule out P vera  Epo is high  however pending JAK2 mutation test which is a determining factor for a diagnosis of P Vera
Plan for dc in the AM
Pending JAK2 result
Discussed with Dr. Pearson, heme, pt cannot be cleared until JAK2 result is back. Will f/u EP if pt can be discharged given and f/u outpt

## 2020-10-02 NOTE — CHART NOTE - NSCHARTNOTEFT_GEN_A_CORE
Heme team consulted for clearance of AICD placement given erythrocytosis. JAK2 was negative making polycythemia vera less likely and no need for phlebotomy. He was cleared for procedure and is now s/p AICD placement. No further heme workup planned as inpatient. Referral will be made upon discharge to [facility] for continued f/u. Heme to sign off, please reach out if any additional questions.    Rolan Salas, PGY-6  Hematology-Oncology Fellow  [phone] ([place]) [phone] ([facility])

## 2020-10-02 NOTE — PROGRESS NOTE ADULT - ASSESSMENT
This is a 35 year old man with a pmhx of HFrEF (30-35%), most recent EF ~15%,  NICM, HTN, T2DM, and HLD who present to an OSH for nausea and emesis for 4 days and course was c/b HTN emergency in the setting of HFrEF from medication noncompliant and increased his hydralazine medication to 75mg po q8h. Patient underwent an ischemic evaluation and cardiac computed tomography angiography was negative per OSH record and TTE revealed worsening EF (<15%).  Patient was transferred to [facility] for a AICD for primary prevention of  SCD from VT/VF.  AICD implant was postponed due to chronic erthrocytosis and ? polycythemia vera workups per Hematology.  Patient was cleared by Hema for ICD on 9/30 and underwent biV ICD ([employer]) implantation on 10/1/2020.  Post ICD teaching provided.  Noted BiV pacing 74% and LRL at 40 bpm.     - Encourage adequate fluid intake as uptrend BUN/Crt  - Continue Guideline Directed Management and Therapy  - Follow up for AICD wound check on 10/13 at 14:15  - Will discuss with Dr. Lu regarding possible re-interrogation this pm, if no reprogram then pt is OK for discharge from EP standpoint                This is a 35 year old man with a pmhx of HFrEF (30-35%), most recent EF ~15%,  NICM, HTN, T2DM, and HLD who present to an OSH for nausea and emesis for 4 days and course was c/b HTN emergency in the setting of HFrEF from medication noncompliant and increased his hydralazine medication to 75mg po q8h. Patient underwent an ischemic evaluation and cardiac computed tomography angiography was negative per OSH record and TTE revealed worsening EF (<15%).  Patient was transferred to [facility] for a AICD for primary prevention of  SCD from VT/VF.  AICD implant was postponed due to chronic erthrocytosis and ? polycythemia vera workups per Hematology.  Patient was cleared by Hema for ICD on 9/30 and underwent biV ICD ([employer]) implantation on 10/1/2020.  Post ICD teaching provided.  Noted BiV pacing 96% and LRL at 40 bpm.  Repeat echo done this am showed improved LVEF 39%.     - Encourage adequate fluid intake as uptrend BUN/Crt  - Continue Guideline Directed Management and Therapy, follow up cardiology  - Follow up for AICD wound check on 10/13 at 14:15  - Interrogation done by [employer] rep which showed normal device functions with excellent threshold capture, no reprogram done, BiV paced 96%  -Possible d/c home tomorrow if scr improves

## 2020-10-02 NOTE — CHART NOTE - NSCHARTNOTEFT_GEN_A_CORE
Patient is s/p AICD placement site on L chest wall. Patient without any complaints. Site is stable with no hematoma or active bleed noted. No swelling, dressing is clean/intact/dry. Radial pulses palpable BL.  strength is equal bilaterally. Patient has full ROM in right wrist. Capillary refill < 2 seconds. Will continue to monitor closely.     Patient was placed in PACU 2/2 hypotension SBP 70-80's. S/p CCU consult. Not a candidate for CCU observation. Will continue with serial BP checks.     YUKI Fontana  Department of Medicine   In House # [id number]

## 2020-10-02 NOTE — PROVIDER CONTACT NOTE (OTHER) - ACTION/TREATMENT ORDERED:
Orthostatic blood pressures completed at bed side with MD Samuel. Laying 90/51 (60 MAP), Sitting 90/57 (64 MAP), Standing 131/99 (106 MAP). Vital signs q4 as per orders.

## 2020-10-02 NOTE — CHART NOTE - NSCHARTNOTEFT_GEN_A_CORE
Patient s/p ICD placement for dilated cardiomyopathy. Patient being observed in PACU 2/2 to hypotension SBP dropping to 70's-80's; asymptomatic at the time. Patient placed on Phenylephrine drip and CCU consult called. CCU evaluated patient off phenylephrine drip; patient sustaining SBP in 80's-90's. CCU NP states she patient not a candidate for overnight observation for CCU. Midodrine and fluids were discussed with CCU NP and are currently not an option. Serial BP checks will be performed. Patient's mental status will be evaluated frequently.   Case discussed with Hospitalist; Mir Prakash.     YUKI Fontana  Department of Medicine   In House # [id number]

## 2020-10-02 NOTE — PROGRESS NOTE ADULT - ASSESSMENT
35 year old male with hx of HTN, HLD, DM, HFref 2/2  non ischemic dilated cardiomyopathy was admitted at [facility] for HFrEF exacerbation due to medication non-compliance. Now transferred from [facility] for AICD placement. JAK2 negative, no signs of polycythemia. s/p AICD on 10/1. post procedure hypotensive, now resolved.

## 2020-10-02 NOTE — CHART NOTE - NSCHARTNOTEFT_GEN_A_CORE
Tele DAGO, Corina Hayes RN escalated a concern for patient's SBP =80's-90's and map=60's s/p ICD. CARROL Greenfield, YUKI Fontana, DAGO Horton, Mabel Kirk RN, and Ivone Alvarenga RN were present to discuss patient's safety. 4 [facility] [place] nurses were uncomfortable caring for the patient with his SBP of 80-90's with MAP of high 50's- 60's on their floor.   At the time of encounter, patient was lying in bed, denies any pain or discomfort. ICD site dry and intact. Orthostatic BP was checked with primary nurses, PA and myself at bedside. BP was 131/99 (106) standing, 90/51 (60) lying, 90/57 (64) sitting at the side of the bed. Pt remained asymptomatic and BP improved. Staffs were comfortable with checking VS q 4hrs. Patient will remain on tele/medicine floor with everyone's understanding and comfort level. If patient becomes symptomatic or condition changes clinically, please escalate to primary team and may re-consult CCU.

## 2020-10-02 NOTE — PROVIDER CONTACT NOTE (OTHER) - ASSESSMENT
Pt asymptomatic. Pt denies dizziness, chest pain, SOB. Dressing to left chest wall remains c/d/i, no s/s of bleeding/hematoma, no edema. Pt denies numbness/tingling/pain. VS as documented.

## 2020-10-02 NOTE — PROGRESS NOTE ADULT - SUBJECTIVE AND OBJECTIVE BOX
Patient is a 35y old  Male who presents with a chief complaint of AICD placement (01 Oct 2020 23:49)  Patient denies CP, SOB or palpitations.  Denies pain at biv ICD wound site.    PAST MEDICAL & SURGICAL HISTORY:  Heart failure with reduced ejection fraction    DM (diabetes mellitus)    HLD (hyperlipidemia)    HTN (hypertension)        MEDICATIONS  (STANDING):  ascorbic acid 500 milliGRAM(s) Oral daily  aspirin  chewable 81 milliGRAM(s) Oral daily  atorvastatin 40 milliGRAM(s) Oral at bedtime  dextrose 5%. 1000 milliLiter(s) (50 mL/Hr) IV Continuous <Continuous>  dextrose 50% Injectable 12.5 Gram(s) IV Push once  dextrose 50% Injectable 25 Gram(s) IV Push once  dextrose 50% Injectable 25 Gram(s) IV Push once  folic acid 1 milliGRAM(s) Oral daily  hydrALAZINE 75 milliGRAM(s) Oral every 8 hours  insulin lispro (HumaLOG) corrective regimen sliding scale   SubCutaneous three times a day before meals  insulin lispro (HumaLOG) corrective regimen sliding scale   SubCutaneous at bedtime  isosorbide   mononitrate ER Tablet (IMDUR) 30 milliGRAM(s) Oral daily  metoprolol succinate ER 50 milliGRAM(s) Oral daily  pantoprazole    Tablet 40 milliGRAM(s) Oral before breakfast  thiamine 100 milliGRAM(s) Oral daily    MEDICATIONS  (PRN):  acetaminophen   Tablet .. 650 milliGRAM(s) Oral every 6 hours PRN Temp greater or equal to 38C (100.4F), Mild Pain (1 - 3)  dextrose 40% Gel 15 Gram(s) Oral once PRN Blood Glucose LESS THAN 70 milliGRAM(s)/deciliter  glucagon  Injectable 1 milliGRAM(s) IntraMuscular once PRN Glucose LESS THAN 70 milligrams/deciliter            Vital Signs Last 24 Hrs  T(C): 36.3 (02 Oct 2020 08:38), Max: 37.1 (02 Oct 2020 00:15)  T(F): 97.3 (02 Oct 2020 08:38), Max: 98.7 (02 Oct 2020 00:15)  HR: 71 (02 Oct 2020 08:38) (57 - 83)  BP: 110/62 (02 Oct 2020 08:38) (79/44 - 116/64)  BP(mean): 64 (02 Oct 2020 05:55) (52 - 77)  RR: 16 (02 Oct 2020 08:38) (12 - 21)  SpO2: 99% (02 Oct 2020 08:38) (98% - 100%)            INTERPRETATION OF TELEMETRY: SR with V paced at 70 bpm, no VT/NSVT seen    ECG: BiV paced         LABS:                        16.7   9.85  )-----------( 188      ( 02 Oct 2020 06:00 )             54.0     10-02    133<L>  |  94<L>  |  50<H>  ----------------------------<  100<H>  4.5   |  21<L>  |  2.43<H>    Ca    9.9      02 Oct 2020 06:00  Phos  5.2     10-02  Mg     1.9     10-02                BNP  RADIOLOGY & ADDITIONAL STUDIES:    INTERPRETATION:  TIME OF EXAM: October 1, 2020 at 9:06 PM    CLINICAL INFORMATION: AICD placement    TECHNIQUE:   Portable chest    INTERPRETATION:    Left-sided multi lead pacemaker has been introduced. No complicating pneumothorax.    Heart appears mildly enlarged despite projection but no effusions or congestion to indicate CHF.      COMPARISON:  None available      IMPRESSION:  Status post AICD placement.              ISABELA FRANCO M.D., ATTENDING RADIOLOGIST  This document has been electronically signed. Oct  2 2020  6:14AM    PHYSICAL EXAM:    GENERAL: In no apparent distress, well nourished, and hydrated.  NECK: Supple and normal thyroid.  No JVD or carotid bruit.  Carotid pulse is 2+ bilaterally.  HEART: Regular rate and rhythm; No murmurs, rubs, or gallops.  Left side ACW incision site with steri strips dry intact, no active bleeding or hematoma   PULMONARY: Clear to auscultation and perfusion.  No rales, wheezing, or rhonchi bilaterally.  ABDOMEN: Soft, Nontender, Nondistended; Bowel sounds present  EXTREMITIES:  2+ Peripheral Pulses, No clubbing, cyanosis, or edema  NEUROLOGICAL: Grossly nonfocal         Patient is a 35y old  Male who presents with a chief complaint of AICD placement (01 Oct 2020 23:49)  Patient denies CP, SOB or palpitations.  Denies pain at biv ICD wound site.    PAST MEDICAL & SURGICAL HISTORY:  Heart failure with reduced ejection fraction    DM (diabetes mellitus)    HLD (hyperlipidemia)    HTN (hypertension)        MEDICATIONS  (STANDING):  ascorbic acid 500 milliGRAM(s) Oral daily  aspirin  chewable 81 milliGRAM(s) Oral daily  atorvastatin 40 milliGRAM(s) Oral at bedtime  dextrose 5%. 1000 milliLiter(s) (50 mL/Hr) IV Continuous <Continuous>  dextrose 50% Injectable 12.5 Gram(s) IV Push once  dextrose 50% Injectable 25 Gram(s) IV Push once  dextrose 50% Injectable 25 Gram(s) IV Push once  folic acid 1 milliGRAM(s) Oral daily  hydrALAZINE 75 milliGRAM(s) Oral every 8 hours  insulin lispro (HumaLOG) corrective regimen sliding scale   SubCutaneous three times a day before meals  insulin lispro (HumaLOG) corrective regimen sliding scale   SubCutaneous at bedtime  isosorbide   mononitrate ER Tablet (IMDUR) 30 milliGRAM(s) Oral daily  metoprolol succinate ER 50 milliGRAM(s) Oral daily  pantoprazole    Tablet 40 milliGRAM(s) Oral before breakfast  thiamine 100 milliGRAM(s) Oral daily    MEDICATIONS  (PRN):  acetaminophen   Tablet .. 650 milliGRAM(s) Oral every 6 hours PRN Temp greater or equal to 38C (100.4F), Mild Pain (1 - 3)  dextrose 40% Gel 15 Gram(s) Oral once PRN Blood Glucose LESS THAN 70 milliGRAM(s)/deciliter  glucagon  Injectable 1 milliGRAM(s) IntraMuscular once PRN Glucose LESS THAN 70 milligrams/deciliter            Vital Signs Last 24 Hrs  T(C): 36.3 (02 Oct 2020 08:38), Max: 37.1 (02 Oct 2020 00:15)  T(F): 97.3 (02 Oct 2020 08:38), Max: 98.7 (02 Oct 2020 00:15)  HR: 71 (02 Oct 2020 08:38) (57 - 83)  BP: 110/62 (02 Oct 2020 08:38) (79/44 - 116/64)  BP(mean): 64 (02 Oct 2020 05:55) (52 - 77)  RR: 16 (02 Oct 2020 08:38) (12 - 21)  SpO2: 99% (02 Oct 2020 08:38) (98% - 100%)            INTERPRETATION OF TELEMETRY: SR with V paced at 70 bpm, no VT/NSVT seen    ECG: BiV paced 60        LABS:                        16.7   9.85  )-----------( 188      ( 02 Oct 2020 06:00 )             54.0     10-02    133<L>  |  94<L>  |  50<H>  ----------------------------<  100<H>  4.5   |  21<L>  |  2.43<H>    Ca    9.9      02 Oct 2020 06:00  Phos  5.2     10-02  Mg     1.9     10-02                BNP  RADIOLOGY & ADDITIONAL STUDIES:  Echo 10/2/2020: LVEF 39%      INTERPRETATION:  TIME OF EXAM: October 1, 2020 at 9:06 PM    CLINICAL INFORMATION: AICD placement    TECHNIQUE:   Portable chest    INTERPRETATION:    Left-sided multi lead pacemaker has been introduced. No complicating pneumothorax.    Heart appears mildly enlarged despite projection but no effusions or congestion to indicate CHF.      COMPARISON:  None available      IMPRESSION:  Status post AICD placement.                  PHYSICAL EXAM:    GENERAL: In no apparent distress, well nourished, and hydrated.  NECK: Supple and normal thyroid.  No JVD or carotid bruit.  Carotid pulse is 2+ bilaterally.  HEART: Regular rate and rhythm; No murmurs, rubs, or gallops.  Left side ACW incision site with steri strips dry intact, no active bleeding or hematoma   PULMONARY: Clear to auscultation and perfusion.  No rales, wheezing, or rhonchi bilaterally.  ABDOMEN: Soft, Nontender, Nondistended; Bowel sounds present  EXTREMITIES:  2+ Peripheral Pulses, No clubbing, cyanosis, or edema  NEUROLOGICAL: Grossly nonfocal

## 2020-10-02 NOTE — PROGRESS NOTE ADULT - SUBJECTIVE AND OBJECTIVE BOX
[facility] Division of Hospital Medicine  Philipp Sykes MD  In House Pager [phone]    Patient is a 35y old  Male who presents with a chief complaint of AICD placement (02 Oct 2020 09:44)      SUBJECTIVE / OVERNIGHT EVENTS:  patient s/p AICD placement yesterday afternoon, overnight hypotensive, but patient was asymptomatic. He states last night was a blurr as he was still under effect of anesthesia.   Patient seen and examined at bedside, labs and vitals reviewed.  patient now walking around in his room, no complaints, no dizziness with standing.   No fever, no chills, no SOB, no CP, no n/v/d, no abd pain, no dysuria      MEDICATIONS  (STANDING):  ascorbic acid 500 milliGRAM(s) Oral daily  aspirin  chewable 81 milliGRAM(s) Oral daily  atorvastatin 40 milliGRAM(s) Oral at bedtime  dextrose 5%. 1000 milliLiter(s) (50 mL/Hr) IV Continuous <Continuous>  dextrose 50% Injectable 12.5 Gram(s) IV Push once  dextrose 50% Injectable 25 Gram(s) IV Push once  dextrose 50% Injectable 25 Gram(s) IV Push once  folic acid 1 milliGRAM(s) Oral daily  hydrALAZINE 75 milliGRAM(s) Oral every 8 hours  insulin lispro (HumaLOG) corrective regimen sliding scale   SubCutaneous three times a day before meals  insulin lispro (HumaLOG) corrective regimen sliding scale   SubCutaneous at bedtime  isosorbide   mononitrate ER Tablet (IMDUR) 30 milliGRAM(s) Oral daily  metoprolol succinate ER 50 milliGRAM(s) Oral daily  pantoprazole    Tablet 40 milliGRAM(s) Oral before breakfast  thiamine 100 milliGRAM(s) Oral daily    MEDICATIONS  (PRN):  acetaminophen   Tablet .. 650 milliGRAM(s) Oral every 6 hours PRN Temp greater or equal to 38C (100.4F), Mild Pain (1 - 3)  dextrose 40% Gel 15 Gram(s) Oral once PRN Blood Glucose LESS THAN 70 milliGRAM(s)/deciliter  glucagon  Injectable 1 milliGRAM(s) IntraMuscular once PRN Glucose LESS THAN 70 milligrams/deciliter    CAPILLARY BLOOD GLUCOSE      POCT Blood Glucose.: 86 mg/dL (02 Oct 2020 08:44)  POCT Blood Glucose.: 170 mg/dL (02 Oct 2020 00:36)  POCT Blood Glucose.: 117 mg/dL (01 Oct 2020 23:05)  POCT Blood Glucose.: 155 mg/dL (01 Oct 2020 16:43)  POCT Blood Glucose.: 78 mg/dL (01 Oct 2020 15:21)  POCT Blood Glucose.: 76 mg/dL (01 Oct 2020 12:22)    I&O's Summary    01 Oct 2020 07:01  -  02 Oct 2020 07:00  --------------------------------------------------------  IN: 225 mL / OUT: 550 mL / NET: -325 mL        PHYSICAL EXAM:  Vital Signs Last 24 Hrs  T(C): 36.3 (02 Oct 2020 08:38), Max: 37.1 (02 Oct 2020 00:15)  T(F): 97.3 (02 Oct 2020 08:38), Max: 98.7 (02 Oct 2020 00:15)  HR: 71 (02 Oct 2020 08:38) (57 - 83)  BP: 110/62 (02 Oct 2020 08:38) (79/44 - 116/64)  BP(mean): 64 (02 Oct 2020 05:55) (52 - 77)  RR: 16 (02 Oct 2020 08:38) (12 - 21)  SpO2: 99% (02 Oct 2020 08:38) (98% - 100%)  Gen: NAD; resting in bed.  Pulm: no respiratory distress; CTA b/l; no wheezing  Cards: RRR, nl S1/S2; no obvious murmurs  Abd: soft; NT on exam  Ext: no cyanosis; no edema  Skin: no rash; no cyanosis    LABS:                        16.7   9.85  )-----------( 188      ( 02 Oct 2020 06:00 )             54.0     10-02    133<L>  |  94<L>  |  50<H>  ----------------------------<  100<H>  4.5   |  21<L>  |  2.43<H>    Ca    9.9      02 Oct 2020 06:00  Phos  5.2     10-02  Mg     1.9     10-02                  RADIOLOGY & ADDITIONAL TESTS:  Results Reviewed: Y  Imaging Personally Reviewed: Y  Electrocardiogram Personally Reviewed: Y    COORDINATION OF CARE:  Care Discussed with Consultants/Other Providers [Y/N]: Y  Prior or Outpatient Records Reviewed [Y/N]: Y

## 2020-10-03 ENCOUNTER — TRANSCRIPTION ENCOUNTER (OUTPATIENT)
Age: 35
End: 2020-10-03

## 2020-10-03 VITALS
OXYGEN SATURATION: 100 % | HEART RATE: 80 BPM | RESPIRATION RATE: 18 BRPM | TEMPERATURE: 98 F | DIASTOLIC BLOOD PRESSURE: 87 MMHG | SYSTOLIC BLOOD PRESSURE: 127 MMHG

## 2020-10-03 LAB
ANION GAP SERPL CALC-SCNC: 16 MMO/L — HIGH (ref 7–14)
BUN SERPL-MCNC: 47 MG/DL — HIGH (ref 7–23)
CALCIUM SERPL-MCNC: 9.7 MG/DL — SIGNIFICANT CHANGE UP (ref 8.4–10.5)
CHLORIDE SERPL-SCNC: 97 MMOL/L — LOW (ref 98–107)
CO2 SERPL-SCNC: 21 MMOL/L — LOW (ref 22–31)
CREAT SERPL-MCNC: 1.69 MG/DL — HIGH (ref 0.5–1.3)
GLUCOSE BLDC GLUCOMTR-MCNC: 112 MG/DL — HIGH (ref 70–99)
GLUCOSE BLDC GLUCOMTR-MCNC: 74 MG/DL — SIGNIFICANT CHANGE UP (ref 70–99)
GLUCOSE SERPL-MCNC: 71 MG/DL — SIGNIFICANT CHANGE UP (ref 70–99)
HCT VFR BLD CALC: 56.3 % — HIGH (ref 39–50)
HGB BLD-MCNC: 16.9 G/DL — SIGNIFICANT CHANGE UP (ref 13–17)
MAGNESIUM SERPL-MCNC: 2 MG/DL — SIGNIFICANT CHANGE UP (ref 1.6–2.6)
MCHC RBC-ENTMCNC: 24.6 PG — LOW (ref 27–34)
MCHC RBC-ENTMCNC: 30 % — LOW (ref 32–36)
MCV RBC AUTO: 82.1 FL — SIGNIFICANT CHANGE UP (ref 80–100)
NRBC # FLD: 0 K/UL — SIGNIFICANT CHANGE UP (ref 0–0)
PHOSPHATE SERPL-MCNC: 3.8 MG/DL — SIGNIFICANT CHANGE UP (ref 2.5–4.5)
PLATELET # BLD AUTO: 183 K/UL — SIGNIFICANT CHANGE UP (ref 150–400)
PMV BLD: 12.7 FL — SIGNIFICANT CHANGE UP (ref 7–13)
POTASSIUM SERPL-MCNC: 3.9 MMOL/L — SIGNIFICANT CHANGE UP (ref 3.5–5.3)
POTASSIUM SERPL-SCNC: 3.9 MMOL/L — SIGNIFICANT CHANGE UP (ref 3.5–5.3)
RBC # BLD: 6.86 M/UL — HIGH (ref 4.2–5.8)
RBC # FLD: 16.8 % — HIGH (ref 10.3–14.5)
SODIUM SERPL-SCNC: 134 MMOL/L — LOW (ref 135–145)
WBC # BLD: 8.98 K/UL — SIGNIFICANT CHANGE UP (ref 3.8–10.5)
WBC # FLD AUTO: 8.98 K/UL — SIGNIFICANT CHANGE UP (ref 3.8–10.5)

## 2020-10-03 PROCEDURE — 99239 HOSP IP/OBS DSCHRG MGMT >30: CPT

## 2020-10-03 PROCEDURE — 99232 SBSQ HOSP IP/OBS MODERATE 35: CPT

## 2020-10-03 RX ADMIN — PANTOPRAZOLE SODIUM 40 MILLIGRAM(S): 20 TABLET, DELAYED RELEASE ORAL at 05:31

## 2020-10-03 RX ADMIN — Medication 500 MILLIGRAM(S): at 13:06

## 2020-10-03 RX ADMIN — Medication 81 MILLIGRAM(S): at 13:06

## 2020-10-03 RX ADMIN — Medication 75 MILLIGRAM(S): at 13:06

## 2020-10-03 RX ADMIN — ISOSORBIDE MONONITRATE 30 MILLIGRAM(S): 60 TABLET, EXTENDED RELEASE ORAL at 13:06

## 2020-10-03 RX ADMIN — Medication 50 MILLIGRAM(S): at 13:06

## 2020-10-03 RX ADMIN — Medication 100 MILLIGRAM(S): at 13:08

## 2020-10-03 RX ADMIN — Medication 75 MILLIGRAM(S): at 05:22

## 2020-10-03 RX ADMIN — Medication 1 MILLIGRAM(S): at 13:06

## 2020-10-03 NOTE — PROGRESS NOTE ADULT - PROBLEM SELECTOR PROBLEM 1
Heart failure with reduced ejection fraction

## 2020-10-03 NOTE — PROGRESS NOTE ADULT - PROBLEM SELECTOR PLAN 8
- lovenox ppx dcd on 9/23 given low improve score     -Dispo- d/c home.
- lovenox ppx dcd on 9/23 given low improve score     -Dispo- pending further medical optimization and heme f/u
- lovenox ppx    -Dispo- pending further medical optimization
- lovenox ppx dcd on 9/23 given low improve score     -Dispo- pending further medical optimization and heme f/u

## 2020-10-03 NOTE — DISCHARGE NOTE NURSING/CASE MANAGEMENT/SOCIAL WORK - NSDCFUADDAPPT_GEN_ALL_CORE_FT
Follow up for AICD wound check on 10/13 at 2:15 PM    Follow up appointment made with Dr. Alexander on 10/12 at 2:30 PM

## 2020-10-03 NOTE — PROGRESS NOTE ADULT - PROBLEM SELECTOR PLAN 6
- hgb a1c 6.6  - currently on ISS  -monitor FS
- lovenox ppx
- unable to afford his medications   - social work consult
- unable to afford his medications   - social work consult
- hgb a1c 6.6  - currently on ISS  -monitor FS

## 2020-10-03 NOTE — PROGRESS NOTE ADULT - PROBLEM SELECTOR PROBLEM 5
HLD (hyperlipidemia)
DM (diabetes mellitus)
DM (diabetes mellitus)
HLD (hyperlipidemia)
Non compliance w medication regimen
HLD (hyperlipidemia)

## 2020-10-03 NOTE — PROGRESS NOTE ADULT - SUBJECTIVE AND OBJECTIVE BOX
Patient seen and examined at bedside.    Overnight Events:     Review Of Systems: No chest pain, shortness of breath, or palpitations            Current Meds:  acetaminophen   Tablet .. 650 milliGRAM(s) Oral every 6 hours PRN  ascorbic acid 500 milliGRAM(s) Oral daily  aspirin  chewable 81 milliGRAM(s) Oral daily  atorvastatin 40 milliGRAM(s) Oral at bedtime  dextrose 40% Gel 15 Gram(s) Oral once PRN  dextrose 5%. 1000 milliLiter(s) IV Continuous <Continuous>  dextrose 50% Injectable 12.5 Gram(s) IV Push once  dextrose 50% Injectable 25 Gram(s) IV Push once  dextrose 50% Injectable 25 Gram(s) IV Push once  folic acid 1 milliGRAM(s) Oral daily  glucagon  Injectable 1 milliGRAM(s) IntraMuscular once PRN  hydrALAZINE 75 milliGRAM(s) Oral every 8 hours  insulin lispro (HumaLOG) corrective regimen sliding scale   SubCutaneous three times a day before meals  insulin lispro (HumaLOG) corrective regimen sliding scale   SubCutaneous at bedtime  isosorbide   mononitrate ER Tablet (IMDUR) 30 milliGRAM(s) Oral daily  metoprolol succinate ER 50 milliGRAM(s) Oral daily  pantoprazole    Tablet 40 milliGRAM(s) Oral before breakfast  thiamine 100 milliGRAM(s) Oral daily      Vitals:  T(F): 97.9 (10-03), Max: 98 (10-02)  HR: 66 (10-03) (66 - 75)  BP: 108/62 (10-03) (105/61 - 117/58)  RR: 16 (10-03)  SpO2: 98% (10-03)  I&O's Summary    02 Oct 2020 07:01  -  03 Oct 2020 07:00  --------------------------------------------------------  IN: 1780 mL / OUT: 2230 mL / NET: -450 mL        Physical Exam:  Appearance: No acute distress; well appearing  HEENT:  EOMI, sclera anicteric, Normal oral mucosa  Cardiovascular: RRR, S1, S2, no murmurs, rubs, or gallops; no edema; no JVD. Left side ICD incision site c/d/i  Respiratory: Clear to auscultation bilaterally, no wheezes, rales, rhonchi  Gastrointestinal: soft, non-tender, non-distended with normal bowel sounds  Musculoskeletal: No clubbing; no joint deformity   Neurologic: Non-focal  Lymphatic: No lymphadenopathy  Psychiatry: AAOx3, mood & affect appropriate  Skin: No rashes, ecchymoses, or cyanosis                          16.9   8.98  )-----------( 183      ( 03 Oct 2020 06:22 )             56.3     10-02    133<L>  |  93<L>  |  51<H>  ----------------------------<  120<H>  4.6   |  25  |  2.02<H>    Ca    10.2      02 Oct 2020 18:30  Phos  4.2     10-02  Mg     2.1     10-02        Echo:  10/2/20  CONCLUSIONS:  1. Normal mitral valve. Minimal mitral regurgitation.  2. Eccentric left ventricular hypertrophy (dilated left  ventricle with normal relative wall thickness).  3. Endocardium not well visualized; grossly moderate global  left ventricular systolic dysfunction.  Endocardial  visualization enhanced with intravenous injection of echo  contrast (Definity).  4. Unable to accurately evaluate right ventricular size or  systolic function.  A device wire is noted in the right  heart.  5. Normal pericardium with no pericardial effusion.  *** No previous Echo exam.    Interpretation of Telemetry:   Patient seen and examined at bedside.    Overnight Events: s/p ICD, no pain, feels well.    Review Of Systems: No chest pain, shortness of breath, or palpitations            Current Meds:  acetaminophen   Tablet .. 650 milliGRAM(s) Oral every 6 hours PRN  ascorbic acid 500 milliGRAM(s) Oral daily  aspirin  chewable 81 milliGRAM(s) Oral daily  atorvastatin 40 milliGRAM(s) Oral at bedtime  dextrose 40% Gel 15 Gram(s) Oral once PRN  dextrose 5%. 1000 milliLiter(s) IV Continuous <Continuous>  dextrose 50% Injectable 12.5 Gram(s) IV Push once  dextrose 50% Injectable 25 Gram(s) IV Push once  dextrose 50% Injectable 25 Gram(s) IV Push once  folic acid 1 milliGRAM(s) Oral daily  glucagon  Injectable 1 milliGRAM(s) IntraMuscular once PRN  hydrALAZINE 75 milliGRAM(s) Oral every 8 hours  insulin lispro (HumaLOG) corrective regimen sliding scale   SubCutaneous three times a day before meals  insulin lispro (HumaLOG) corrective regimen sliding scale   SubCutaneous at bedtime  isosorbide   mononitrate ER Tablet (IMDUR) 30 milliGRAM(s) Oral daily  metoprolol succinate ER 50 milliGRAM(s) Oral daily  pantoprazole    Tablet 40 milliGRAM(s) Oral before breakfast  thiamine 100 milliGRAM(s) Oral daily      Vitals:  T(F): 97.9 (10-03), Max: 98 (10-02)  HR: 66 (10-03) (66 - 75)  BP: 108/62 (10-03) (105/61 - 117/58)  RR: 16 (10-03)  SpO2: 98% (10-03)  I&O's Summary    02 Oct 2020 07:01  -  03 Oct 2020 07:00  --------------------------------------------------------  IN: 1780 mL / OUT: 2230 mL / NET: -450 mL        Physical Exam:  Appearance: No acute distress; well appearing  HEENT:  EOMI, sclera anicteric, Normal oral mucosa  Cardiovascular: RRR, S1, S2, no murmurs, rubs, or gallops; no edema; no JVD. Left side ICD incision site c/d/i, steri strips in place, no hematoma  Respiratory: Clear to auscultation bilaterally, no wheezes, rales, rhonchi  Gastrointestinal: soft, non-tender, non-distended with normal bowel sounds  Musculoskeletal: No clubbing; no joint deformity   Neurologic: Non-focal  Lymphatic: No lymphadenopathy  Psychiatry: AAOx3, mood & affect appropriate  Skin: No rashes, ecchymoses, or cyanosis                          16.9   8.98  )-----------( 183      ( 03 Oct 2020 06:22 )             56.3     10-02    133<L>  |  93<L>  |  51<H>  ----------------------------<  120<H>  4.6   |  25  |  2.02<H>    Ca    10.2      02 Oct 2020 18:30  Phos  4.2     10-02  Mg     2.1     10-02        Echo:  10/2/20  CONCLUSIONS:  1. Normal mitral valve. Minimal mitral regurgitation.  2. Eccentric left ventricular hypertrophy (dilated left  ventricle with normal relative wall thickness).  3. Endocardium not well visualized; grossly moderate global  left ventricular systolic dysfunction.  Endocardial  visualization enhanced with intravenous injection of echo  contrast (Definity).  4. Unable to accurately evaluate right ventricular size or  systolic function.  A device wire is noted in the right  heart.  5. Normal pericardium with no pericardial effusion.  *** No previous Echo exam.    Interpretation of Telemetry: BiV P 70s

## 2020-10-03 NOTE — PROGRESS NOTE ADULT - PROBLEM SELECTOR PLAN 7
- unable to afford his medications   - social work consult
- lovenox ppx
- lovenox ppx
- unable to afford his medications   - social work consult

## 2020-10-03 NOTE — DISCHARGE NOTE NURSING/CASE MANAGEMENT/SOCIAL WORK - PATIENT PORTAL LINK FT
You can access the FollowMyHealth Patient Portal offered by [facility] by registering at the following website: [email or web address]. By joining [facility]’s FollowMyHealth portal, you will also be able to view your health information using other applications (apps) compatible with our system.

## 2020-10-03 NOTE — PROGRESS NOTE ADULT - ASSESSMENT
36 yo M with a PMH of HFrEF (30-35%), most recent EF ~15%,  NICM, HTN, T2DM, and HLD who presented to an OSH with nausea and emesis, found to have HTN emergency in the setting of medication noncompliance. Patient transferred to [facility] for a AICD for primary prevention after ischemic workup at OSH was negative. AICD implant was postponed due to chronic erythrocytosis and ? polycythemia vera workups per Hematology.  Patient was cleared by [facility] for ICD on 9/30 and underwent BiV ICD ([employer]) implantation on 10/1/2020.  Post ICD teaching provided.  Noted BiV pacing 96% and LRL at 40 bpm.    - Encourage adequate fluid intake as uptrend BUN/Crt  - Continue Guideline Directed Management and Therapy, follow up cardiology  - Follow up for AICD wound check on 10/13 at 14:15  - Interrogation done by [employer] rep which showed normal device functions with excellent threshold capture, no reprogram done, BiV paced 96%     36 yo M with a PMH of HFrEF (30-35%), most recent EF ~15%,  NICM, HTN, T2DM, and HLD who presented to an OSH with nausea and emesis, found to have HTN emergency in the setting of medication noncompliance. Patient transferred to [facility] for a AICD for primary prevention after ischemic workup at OSH was negative. AICD implant was postponed due to chronic erythrocytosis and ? polycythemia vera workups per Hematology.  Patient was cleared by [facility] for ICD on 9/30 and underwent BiV ICD ([employer]) implantation on 10/1/2020.  Post ICD teaching provided.  Noted BiV pacing 96% and LRL at 40 bpm.    - Encourage adequate fluid intake as uptrend BUN/Crt  - Continue Guideline Directed Management and Therapy, follow up cardiology  - Follow up for AICD wound check on 10/13 at 14:15  - Interrogation done by [facility] rep which showed normal device functions with excellent threshold capture, no reprogram done, BiV paced 96%  - stable for d/c home    Bala Clayton MD  Cardiology Fellow  [phone]  All Cardiology service information can be found 24/7 on [email or web address], password: [email or web address]

## 2020-10-03 NOTE — PROGRESS NOTE ADULT - PROBLEM SELECTOR PROBLEM 2
Erythrocytosis
HTN (hypertension)
Erythrocytosis
Erythrocytosis

## 2020-10-03 NOTE — PROGRESS NOTE ADULT - REASON FOR ADMISSION
AICD placement

## 2020-10-03 NOTE — PROGRESS NOTE ADULT - PROVIDER SPECIALTY LIST ADULT
Electrophysiology
Heme/Onc
Hospitalist
Electrophysiology
Electrophysiology
Hospitalist

## 2020-10-03 NOTE — PROGRESS NOTE ADULT - PROBLEM SELECTOR PROBLEM 3
Creatinine elevation
HLD (hyperlipidemia)
HTN (hypertension)
HTN (hypertension)
Creatinine elevation
Creatinine elevation

## 2020-10-03 NOTE — PROGRESS NOTE ADULT - ASSESSMENT
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 Now transferred from [facility] for AICD placement. JAK2 negative, no signs of polycythemia. s/p AICD on 10/1. post procedure hypotensive, now resolved. JO back to baseline Cr.

## 2020-10-03 NOTE — PROGRESS NOTE ADULT - PROBLEM SELECTOR PLAN 5
- continue statin
- continue statin
- continue statin   - 9/15/20 FLP showed cholesterol = 136; TG = 80; HDL = 26; LDL = 108
- continue statin
- check hgba1c   - ISS q6 while NPO
- continue statin   - 9/15/20 FLP showed cholesterol = 136; TG = 80; HDL = 26; LDL = 108
- hgb a1c 6.6  - currently on ISS
- unable to afford his medications   - social work consult
- continue statin   - 9/15/20 FLP showed cholesterol = 136; TG = 80; HDL = 26; LDL = 108

## 2020-10-03 NOTE — PROGRESS NOTE ADULT - PROBLEM SELECTOR PLAN 4
Hypotensive overnight likely related to anesthesia.   BP improved this morning.  - continue his medications - metoprolol, hydralazine  - lisinopril on hold  - monitor BP.
- blood pressure stable   - continue his medications - lisinopril, metoprolol, hydralazine
- blood pressure stable   - continue his medications - lisinopril, metoprolol, hydralazine
Hypotensive overnight likely related to anesthesia.   BP improved this morning.  - continue his medications - metoprolol, hydralazine  - lisinopril on hold  - monitor BP.
- blood pressure stable   - continue his medications - lisinopril, metoprolol, hydralazine
- check hgba1c   - ISS q6 while NPO
- continue statin   - 9/15/20 FLP showed cholesterol = 136; TG = 80; HDL = 26; LDL = 108
- continue statin   - 9/15/20 FLP showed cholesterol = 136; TG = 80; HDL = 26; LDL = 108
- blood pressure stable   - continue his medications - lisinopril, metoprolol, hydralazine

## 2020-10-03 NOTE — PROGRESS NOTE ADULT - SUBJECTIVE AND OBJECTIVE BOX
[facility] Division of Hospital Medicine  Philipp Sykes MD  In House Pager [phone]    Patient is a 35y old  Male who presents with a chief complaint of AICD placement (03 Oct 2020 08:24)      SUBJECTIVE / OVERNIGHT EVENTS:  No overnight events. Patient seen and examined at bedside, labs and vitals reviewed.  he reports feeling well. no complaints today. His Cr is back to baseline.   No fever, no chills, no SOB, no CP, no n/v/d, no abd pain, no dysuria      MEDICATIONS  (STANDING):  ascorbic acid 500 milliGRAM(s) Oral daily  aspirin  chewable 81 milliGRAM(s) Oral daily  atorvastatin 40 milliGRAM(s) Oral at bedtime  dextrose 5%. 1000 milliLiter(s) (50 mL/Hr) IV Continuous <Continuous>  dextrose 50% Injectable 12.5 Gram(s) IV Push once  dextrose 50% Injectable 25 Gram(s) IV Push once  dextrose 50% Injectable 25 Gram(s) IV Push once  folic acid 1 milliGRAM(s) Oral daily  hydrALAZINE 75 milliGRAM(s) Oral every 8 hours  insulin lispro (HumaLOG) corrective regimen sliding scale   SubCutaneous three times a day before meals  insulin lispro (HumaLOG) corrective regimen sliding scale   SubCutaneous at bedtime  isosorbide   mononitrate ER Tablet (IMDUR) 30 milliGRAM(s) Oral daily  metoprolol succinate ER 50 milliGRAM(s) Oral daily  pantoprazole    Tablet 40 milliGRAM(s) Oral before breakfast  thiamine 100 milliGRAM(s) Oral daily    MEDICATIONS  (PRN):  acetaminophen   Tablet .. 650 milliGRAM(s) Oral every 6 hours PRN Temp greater or equal to 38C (100.4F), Mild Pain (1 - 3)  dextrose 40% Gel 15 Gram(s) Oral once PRN Blood Glucose LESS THAN 70 milliGRAM(s)/deciliter  glucagon  Injectable 1 milliGRAM(s) IntraMuscular once PRN Glucose LESS THAN 70 milligrams/deciliter    CAPILLARY BLOOD GLUCOSE      POCT Blood Glucose.: 112 mg/dL (03 Oct 2020 11:59)  POCT Blood Glucose.: 74 mg/dL (03 Oct 2020 08:24)  POCT Blood Glucose.: 104 mg/dL (02 Oct 2020 21:57)  POCT Blood Glucose.: 99 mg/dL (02 Oct 2020 17:26)    I&O's Summary    02 Oct 2020 07:01  -  03 Oct 2020 07:00  --------------------------------------------------------  IN: 1780 mL / OUT: 2230 mL / NET: -450 mL    03 Oct 2020 07:01  -  03 Oct 2020 14:26  --------------------------------------------------------  IN: 0 mL / OUT: 450 mL / NET: -450 mL        PHYSICAL EXAM:  Vital Signs Last 24 Hrs  T(C): 36.5 (03 Oct 2020 13:06), Max: 36.6 (02 Oct 2020 20:52)  T(F): 97.7 (03 Oct 2020 13:06), Max: 97.9 (02 Oct 2020 20:52)  HR: 80 (03 Oct 2020 13:06) (66 - 80)  BP: 127/87 (03 Oct 2020 13:06) (108/62 - 127/87)  BP(mean): 77 (03 Oct 2020 04:52) (77 - 86)  RR: 18 (03 Oct 2020 13:06) (16 - 18)  SpO2: 100% (03 Oct 2020 13:06) (98% - 100%)    Gen: NAD; resting in bed.  Pulm: no respiratory distress; CTA b/l; no wheezing  Cards: RRR, nl S1/S2; no obvious murmurs  Abd: soft; NT on exam  Ext: no cyanosis; no edema  Skin: no rash; no cyanosis    LABS:                        16.9   8.98  )-----------( 183      ( 03 Oct 2020 06:22 )             56.3     10-03    134<L>  |  97<L>  |  47<H>  ----------------------------<  71  3.9   |  21<L>  |  1.69<H>    Ca    9.7      03 Oct 2020 06:22  Phos  3.8     10-03  Mg     2.0     10-03            Urinalysis Basic - ( 02 Oct 2020 13:21 )    Color: YELLOW / Appearance: Lt TURBID / S.014 / pH: 5.0  Gluc: NEGATIVE / Ketone: NEGATIVE  / Bili: NEGATIVE / Urobili: NORMAL   Blood: NEGATIVE / Protein: 10 / Nitrite: NEGATIVE   Leuk Esterase: LARGE / RBC: 0-2 / WBC >50   Sq Epi: FEW / Non Sq Epi: x / Bacteria: FEW          RADIOLOGY & ADDITIONAL TESTS:  Results Reviewed: Y  Imaging Personally Reviewed: Y  Electrocardiogram Personally Reviewed: Y    COORDINATION OF CARE:  Care Discussed with Consultants/Other Providers [Y/N]: Y  Prior or Outpatient Records Reviewed [Y/N]: Y

## 2020-10-03 NOTE — PROGRESS NOTE ADULT - PROBLEM SELECTOR PROBLEM 4
HTN (hypertension)
DM (diabetes mellitus)
HLD (hyperlipidemia)
HLD (hyperlipidemia)
HTN (hypertension)

## 2020-10-03 NOTE — PROGRESS NOTE ADULT - PROBLEM SELECTOR PLAN 1
chronic systolic HF, Non adherent with medications due to financial constraints  -currently appears euvolemic  - EP planning AICD placement today, heme cleared pt.   -uncertain etiology of HF;  Trypanosoma cruzii blood work Thus far, negative.  - TTE on 9/15/20 with EF <15%  - not in distress, no SOB, no LE swelling   - continue lasix  40mg PO BID per cards recs   - continue lisinopril 40 mg PO OD  - continue metoprolol XL 50 mg daily
chronic systolic HF, Non adherent with medications due to financial constraints  -currently appears euvolemic  -uncertain etiology of HF;  Trypanosoma cruzii blood work Thus far, negative.  - TTE on 9/15/20 with EF <15%  - not in distress, no SOB, no LE swelling   - continue lasix  40mg PO BID per cards recs   - continue lisinopril 40 mg PO OD  - continue metoprolol XL 50 mg daily   - EP planning AICD placement;  but on hold for now pending heme clearance, awaiting JAK2 result  - Outpt w/u for LVAD
chronic systolic HF, Non adherent with medications due to financial constraints  -currently appears euvolemic  - EP planning AICD placement today, heme cleared pt.   -uncertain etiology of HF;  Trypanosoma cruzii blood work Thus far, negative.  - TTE on 9/15/20 with EF <15%  - not in distress, no SOB, no LE swelling   - continue metoprolol XL 50 mg daily  - holding lasix and lisinopril due to JO.
- non compliant with medications due to financial constraints  -currently euvolemic  -uncertain etiology of HF; will send blood work for Trypanosoma cruzii  - TTE on 9/15/20 with EF <15%  - no in distress, no SOB, no LE swelling   - continue lasix 40 PO daily  - continue lisinopril 40 mg PO OD  - continue metoprolol XL 50 mg daily   - EP planning AICD placement today
- non compliant with medications due to financial constraints  -currently euvolemic  -uncertain etiology of HF; will send blood work for Trypanosoma cruzii  - TTE on 9/15/20 with EF <15%  - no in distress, no SOB, no LE swelling   - continue lasix 40 PO daily  - continue lisinopril 40 mg PO OD  - continue metoprolol XL 50 mg daily   - EP planning AICD placement today
- non compliant with medications due to financial constraints  -currently euvolemic  -uncertain etiology of HF; will send blood work for Trypanosoma cruzii. Thus far, negative x 2. Will be effectively ruled out once 3rd sample yields negative results.   - TTE on 9/15/20 with EF <15%  - not in distress, no SOB, no LE swelling   - continue lasix 40 PO daily  - continue lisinopril 40 mg PO OD  - continue metoprolol XL 50 mg daily   - EP planning AICD placement; on hold for now  - spoke to HF for possible LVAD w/u; they recommend outpatient appointment at HF clinic
Non adherant with medications due to financial constraints  -currently appears euvolemic  -uncertain etiology of HF;  Trypanosoma cruzii blood work Thus far, negative.  - TTE on 9/15/20 with EF <15%  - not in distress, no SOB, no LE swelling   - continue lasix will increase to 40mg PO BID per cards recs   - continue lisinopril 40 mg PO OD  - continue metoprolol XL 50 mg daily   - EP planning AICD placement;  but on hold for now pending heme clearance  - Outpt w/u for LVAD
chronic systolic HF, Non adherent with medications due to financial constraints  -currently appears euvolemic  - EP planning AICD placement;  but on hold for now pending heme clearance, awaiting JAK2 result ( prelim neg as of today, will f/u heme clearance)  - Outpt w/u for LVAD  -uncertain etiology of HF;  Trypanosoma cruzii blood work Thus far, negative.  - TTE on 9/15/20 with EF <15%  - not in distress, no SOB, no LE swelling   - continue lasix  40mg PO BID per cards recs   - continue lisinopril 40 mg PO OD  - continue metoprolol XL 50 mg daily
chronic systolic HF, Non adherent with medications due to financial constraints  -currently appears euvolemic  -uncertain etiology of HF;  Trypanosoma cruzii blood work Thus far, negative.  - TTE on 9/15/20 with EF <15%  - not in distress, no SOB, no LE swelling   - continue lasix  40mg PO BID per cards recs   - continue lisinopril 40 mg PO OD  - continue metoprolol XL 50 mg daily   - EP planning AICD placement;  but on hold for now pending heme clearance, awaiting JAK2 result  - Outpt w/u for LVAD
Non adherant with medications due to financial constraints  -currently appears euvolemic  -uncertain etiology of HF;  Trypanosoma cruzii blood work Thus far, negative.  - TTE on 9/15/20 with EF <15%  - not in distress, no SOB, no LE swelling   - continue lasix  40mg PO BID per cards recs   - continue lisinopril 40 mg PO OD  - continue metoprolol XL 50 mg daily   - EP planning AICD placement;  but on hold for now pending heme clearance  - Outpt w/u for LVAD
chronic systolic HF, Non adherent with medications due to financial constraints  -currently appears euvolemic  - EP planning AICD placement today, heme cleared pt.   -uncertain etiology of HF;  Trypanosoma cruzii blood work Thus far, negative.  - TTE on 9/15/20 with EF <15%  - not in distress, no SOB, no LE swelling   - continue metoprolol XL 50 mg daily  - restart lisinopril and lasix at home dose.

## 2020-10-03 NOTE — PROGRESS NOTE ADULT - PROBLEM SELECTOR PROBLEM 6
DM (diabetes mellitus)
DVT prophylaxis
Non compliance w medication regimen
Non compliance w medication regimen
DM (diabetes mellitus)

## 2020-10-03 NOTE — PROGRESS NOTE ADULT - PROBLEM SELECTOR PROBLEM 7
Non compliance w medication regimen
DVT prophylaxis
DVT prophylaxis
Non compliance w medication regimen

## 2020-10-03 NOTE — PROGRESS NOTE ADULT - PROBLEM SELECTOR PLAN 3
post-op JO now resolved.   restart lisinopril and furosemide home dose.   outpatient follow up for monitoring of renal function.
Creatinine increasing. Not meeting criteria for JO at this point.   -currently on lisinopril and lasix.   -will monitor for now.   -If continues to increase will need to d/w medication regimen w/ cardiology
Creatinine increasing. Not meeting criteria for JO at this point.   -currently on lisinopril and lasix.   -will monitor for now.   -If continues to increase will need to d/w medication regimen w/ cardiology
Creatinine increasing. Not meeting criteria for JO at this point.   -currently on lisinopril and lasix.   -will monitor for now.   -If continues to increase will need to d/w medication regimen w/ cardiology  on 10/2 suzan developed JO after hypotensive episode in relation to his AICD placement. BP now improved, but Cr. increased to 2.49. bladder scan with only 400cc of urine. Lisinopril and lasix on hold  - repeat BMP today, monitor Cr. UOP.  - avoid nephrotoxics.
- blood pressure stable   - continue his medications - lisinopril, metoprolol, hydralazine
- blood pressure stable   - continue his medications - lisinopril, metoprolol, hydralazine,
- continue statin   - 9/15/20 FLP showed cholesterol = 136; TG = 80; HDL = 26; LDL = 108
Creatinine increasing. Not meeting criteria for JO at this point.   -currently on lisinopril and lasix increased today.   -will monitor for now.   -If continues to increase will need to d/w medication regimen w/ cardiology
Creatinine increasing. Not meeting criteria for JO at this point.   -currently on lisinopril and lasix.   -will monitor for now.   -If continues to increase will need to d/w medication regimen w/ cardiology

## 2020-10-06 LAB
JAK2 P.V617F BLD/T QL: SIGNIFICANT CHANGE UP
JAK2 P.V617F BLD/T QL: SIGNIFICANT CHANGE UP

## 2020-10-06 NOTE — PROGRESS NOTE ADULT - PROBLEM SELECTOR PROBLEM 8
No
DVT prophylaxis

## 2020-10-12 ENCOUNTER — APPOINTMENT (OUTPATIENT)
Dept: ELECTROPHYSIOLOGY | Facility: CLINIC | Age: 35
End: 2020-10-12
Payer: MEDICAID

## 2020-10-12 ENCOUNTER — NON-APPOINTMENT (OUTPATIENT)
Age: 35
End: 2020-10-12

## 2020-10-12 ENCOUNTER — APPOINTMENT (OUTPATIENT)
Dept: CARDIOLOGY | Facility: CLINIC | Age: 35
End: 2020-10-12
Payer: MEDICAID

## 2020-10-12 VITALS — WEIGHT: 229 LBS

## 2020-10-12 VITALS
HEIGHT: 67 IN | OXYGEN SATURATION: 100 % | BODY MASS INDEX: 35.87 KG/M2 | HEART RATE: 88 BPM | TEMPERATURE: 98.2 F | SYSTOLIC BLOOD PRESSURE: 138 MMHG | DIASTOLIC BLOOD PRESSURE: 86 MMHG

## 2020-10-12 DIAGNOSIS — Z86.39 PERSONAL HISTORY OF OTHER ENDOCRINE, NUTRITIONAL AND METABOLIC DISEASE: ICD-10-CM

## 2020-10-12 DIAGNOSIS — I42.0 DILATED CARDIOMYOPATHY: ICD-10-CM

## 2020-10-12 DIAGNOSIS — I10 ESSENTIAL (PRIMARY) HYPERTENSION: ICD-10-CM

## 2020-10-12 PROCEDURE — 93000 ELECTROCARDIOGRAM COMPLETE: CPT

## 2020-10-12 PROCEDURE — 99204 OFFICE O/P NEW MOD 45 MIN: CPT

## 2020-10-12 PROCEDURE — 99024 POSTOP FOLLOW-UP VISIT: CPT

## 2020-10-12 RX ORDER — ISOSORBIDE MONONITRATE 30 MG/1
30 TABLET, EXTENDED RELEASE ORAL
Qty: 60 | Refills: 4 | Status: ACTIVE | COMMUNITY
Start: 2020-10-12

## 2020-10-12 RX ORDER — THIAMINE MONONITRATE (VIT B1) 100 MG
100 TABLET ORAL DAILY
Qty: 90 | Refills: 3 | Status: ACTIVE | COMMUNITY
Start: 2020-10-12

## 2020-10-12 RX ORDER — LISINOPRIL 40 MG/1
40 TABLET ORAL DAILY
Qty: 90 | Refills: 3 | Status: DISCONTINUED | COMMUNITY
Start: 2020-10-12 | End: 2020-10-12

## 2020-10-12 RX ORDER — SACUBITRIL AND VALSARTAN 97; 103 MG/1; MG/1
97-103 TABLET, FILM COATED ORAL
Qty: 60 | Refills: 3 | Status: ACTIVE | COMMUNITY
Start: 2020-10-12 | End: 1900-01-01

## 2020-10-12 RX ORDER — HYDRALAZINE HYDROCHLORIDE 50 MG/1
50 TABLET ORAL
Qty: 180 | Refills: 0 | Status: ACTIVE | COMMUNITY
Start: 2020-10-12

## 2020-10-12 RX ORDER — FOLIC ACID 1 MG/1
1 TABLET ORAL
Qty: 30 | Refills: 6 | Status: ACTIVE | COMMUNITY
Start: 2020-10-12

## 2020-10-12 RX ORDER — METOPROLOL SUCCINATE 50 MG/1
50 TABLET, EXTENDED RELEASE ORAL
Refills: 3 | Status: ACTIVE | COMMUNITY
Start: 2020-10-12

## 2020-10-12 RX ORDER — ATORVASTATIN CALCIUM 40 MG/1
40 TABLET, FILM COATED ORAL
Qty: 30 | Refills: 3 | Status: ACTIVE | COMMUNITY
Start: 2020-10-12

## 2020-10-12 RX ORDER — PANTOPRAZOLE 40 MG/1
40 TABLET, DELAYED RELEASE ORAL DAILY
Qty: 30 | Refills: 4 | Status: ACTIVE | COMMUNITY
Start: 2020-10-12

## 2020-10-12 RX ORDER — FUROSEMIDE 40 MG/1
40 TABLET ORAL
Refills: 0 | Status: ACTIVE | COMMUNITY
Start: 2020-10-12

## 2020-10-12 RX ORDER — MULTIVIT-MIN/FOLIC/VIT K/LYCOP 400-300MCG
500 TABLET ORAL
Qty: 90 | Refills: 3 | Status: ACTIVE | COMMUNITY
Start: 2020-10-12

## 2020-10-12 NOTE — PHYSICAL EXAM
[Normal Appearance] : normal appearance [General Appearance - In No Acute Distress] : no acute distress [Normal Conjunctiva] : the conjunctiva exhibited no abnormalities [Normal Oral Mucosa] : normal oral mucosa [Normal Oropharynx] : normal oropharynx [Normal Jugular Venous A Waves Present] : normal jugular venous A waves present [Normal Jugular Venous V Waves Present] : normal jugular venous V waves present [No Jugular Venous Jessica A Waves] : no jugular venous jessica A waves [Heart Rate And Rhythm] : heart rate and rhythm were normal [Arterial Pulses Normal] : the arterial pulses were normal [] : no respiratory distress [Respiration, Rhythm And Depth] : normal respiratory rhythm and effort [Auscultation Breath Sounds / Voice Sounds] : lungs were clear to auscultation bilaterally [Bowel Sounds] : normal bowel sounds [Abdomen Soft] : soft [Abdomen Tenderness] : non-tender [Abnormal Walk] : normal gait [Nail Clubbing] : no clubbing of the fingernails [Cyanosis, Localized] : no localized cyanosis [Skin Color & Pigmentation] : normal skin color and pigmentation [Skin Turgor] : normal skin turgor [Oriented To Time, Place, And Person] : oriented to person, place, and time [FreeTextEntry1] : s

## 2020-10-12 NOTE — ASSESSMENT
[FreeTextEntry1] : 36 yo M with a PMH of HFrEF (30-35% ), most recent EF ~15% with repeat TTE post CRT-D on 10/2 with LVEF 39%, LVIDD 5.8 cm, NICM, HTN, T2DM,and HLD who presented to an [facility] with nausea and emesis, found to have HTN urgency in the setting of medication noncompliance. Patient transferred to [facility] for an ICD for primary prevention after ischemic workup at [facility] was negative. ICD implant was postponed due to chronic erythrocytosis and ? polycythemia vera workups per Hematology. Patient was cleared by Heme for ICD on 9/30/20 and underwent BiV ICD ([employer]) implantation on 10/1/2020 and follow up  with BiV pacing 96%. Patient is here for an initial consultation for assistance with cardiac optimization after admission 9/22-9/30/20 for ADHF and CRT-D device check. \par ACC/AHA Stage C, NYHA Class II\par Appears compensated and with B/P 138/86

## 2020-10-12 NOTE — HISTORY OF PRESENT ILLNESS
[FreeTextEntry1] : Sean Peacock is 34 yo M with a PMH of HFrEF (30-35% ), most recent EF ~15% with repeat TTE post CRT-D on 10/2 with LVEF 39%, LVIDD 5.8 cm, NICM, HTN, T2DM,and HLD who presented to an [facility] with nausea and emesis, found to have HTN urgency in the setting of medication noncompliance. Patient transferred to [facility] for an ICD for primary prevention after ischemic workup at [facility] was negative. ICD implant was postponed due to chronic erythrocytosis and ? polycythemia vera workups per Hematology. Patient was cleared by [facility] for ICD on 9/30/20 and underwent BiV ICD ([employer]) implantation on 10/1/2020 and follow up  with BiV pacing 96%. Patient is here for an initial consultation for assistance with cardiac optimization after admission 9/22-9/30/20 for ADHF and CRT-D device check. \par PCP Dr. Josafat Bertrand.\par \par Pt is here for an initial HF consultation after recent admission with CRT-D placement on 9/30/20 and was seen by Dr. Alexander with  ref # [id number]. Pt states he came from [facility] 10 years ago and has a brother ( speaks English) and his father here. He was working maintenance but stopped 3 months ago when he was having symptoms of chest pains and shortness of breath. He does not smoke, drink alcohol or take drugs. Pt states currently, he can walk approx 4 blocks and can climb 1 flight of stairs before he feels tired. He sleeps with 1 pillow with no orthopnea. He denies chest pain, palpitations, dizziness/LH, syncope and no CRT-D firing. \par \par Relevant studies: \par \par Patient name: SEAN PEACOCK\par YOB: 1985   Age: 35 (M)   MR#: [id number]\par Study Date: 10/2/2020\par Location: [id number]: Delia Ricks RDCS\par Study quality: Technically Poor\par Referring Physician: Buster Dutton MD\par Blood Pressure: 95/48 mmHg\par Height: 165 cm\par Weight: 103 kg\par BSA: 2.1 m2\par ------------------------------------------------------------------------\par PROCEDURE: Transthoracic echocardiogram with 2-D, M-Mode\par and complete spectral and color flow Doppler.\par Intravenous ultrasound enhancing agent was administered for\par improved left ventricular endocardial border definition. \par Following the intravenous injection of ultrasound enhancing\par agent, harmonic imaging was performed.\par INDICATION: Cardiac tamponade (I31.4)\par ------------------------------------------------------------------------\par DIMENSIONS:\par Dimensions:     Normal Values:\par LA:     3.7 cm    2.0 - 4.0 cm\par Ao:     3.2 cm    2.0 - 3.8 cm\par SEPTUM: 1.1 cm    0.6 - 1.2 cm\par PWT:    1.1 cm    0.6 - 1.1 cm\par LVIDd:  5.8 cm    3.0 - 5.6 cm\par LVIDs:  4.7 cm    1.8 - 4.0 cm\par Derived Variables:\par LVMI: 126 g/m2\par RWT: 0.37\par Fractional short: 19 %\par Ejection Fraction (Teicholtz): 39 %\par ------------------------------------------------------------------------\par OBSERVATIONS:\par Mitral Valve: Normal mitral valve. Minimal mitral\par regurgitation.\par Aortic Root: Normal aortic root.\par Aortic Valve: Normal trileaflet aortic valve.\par Left Atrium: Normal left atrium.\par Left Ventricle: Endocardium not well visualized; grossly\par moderate global left ventricular systolic dysfunction. \par Endocardial visualization enhanced with intravenous\par injection of echo contrast (Definity). Eccentric left\par ventricular hypertrophy (dilated left ventricle with normal\par relative wall thickness).\par Right Heart: Normal right atrium. Unable to accurately\par evaluate right ventricular size or systolic function.  A\par device wire is noted in the right heart. Normal tricuspid\par valve. Minimal tricuspid regurgitation. Normal pulmonic\par valve. Minimal pulmonic regurgitation.\par Pericardium/PleuraNormal pericardium with no pericardial\par effusion.\par ------------------------------------------------------------------------\par CONCLUSIONS:\par 1. Normal mitral valve. Minimal mitral regurgitation.\par 2. Eccentric left ventricular hypertrophy (dilated left\par ventricle with normal relative wall thickness).\par 3. Endocardium not well visualized; grossly moderate global\par left ventricular systolic dysfunction.  Endocardial\par visualization enhanced with intravenous injection of echo\par contrast (Definity).\par 4. Unable to accurately evaluate right ventricular size or\par systolic function.  A device wire is noted in the right\par heart.\par 5. Normal pericardium with no pericardial effusion.\par *** No previous Echo exam.\par

## 2020-10-12 NOTE — REASON FOR VISIT
[Initial Evaluation] : an initial evaluation of [Heart Failure] : congestive heart failure [Hyperlipidemia] : hyperlipidemia [Hypertension] : hypertension [Admitted for Heart Failure] : patient was admitted for heart failure [FreeTextEntry2] : S/P hospitalization 9/22-9/30/20 for ADHF

## 2020-10-12 NOTE — DISCUSSION/SUMMARY
[Patient] : the patient [___ Week(s)] : [unfilled] week(s) [FreeTextEntry1] : 1. S/P acute on chronic systolic HFrEF (30-35% ), most recent EF ~15% with repeat TTE post CRT-D on 10/2 with LVEF 39%, LVIDD 5.8 cm, NICM\par - S/P CRT-D implanted on 9/30, device check today with some sensing issues and will return 10/13 for original appt date to follow up\par - d/c lisinopril ( last dose 10/12) and will start Entresto  mg bid on 10/14/20. Pt was given a 30 day coupon card and will repeat labs with PCP Dr. Josafat Bertrand in 2 weeks to recheck K and renal function.\par - continue metoprolol 50 mg daily, will continue uptitration to goal of 100 mg daily\par -continue hydral 50 mg tid and imdur 30 mg daily\par - continue furosemide 40 mg daily, may need to decrease if BUN/creat are elevated\par - limit salt to less than 2000 mg per day\par - limit fluid to less than 64 oz per day\par - daily weight log\par \par 2. S/P Hypertensive urgency at [facility]\par - B/P 138/86 today\par - transition to Entresto as above\par - continue hydr/imdur as above\par \par Follow up in 4 weeks for further med uptitration and will recheck labs with PCP in 2 weeks

## 2020-10-13 ENCOUNTER — APPOINTMENT (OUTPATIENT)
Dept: ELECTROPHYSIOLOGY | Facility: CLINIC | Age: 35
End: 2020-10-13
Payer: MEDICAID

## 2020-10-13 DIAGNOSIS — I42.0 DILATED CARDIOMYOPATHY: ICD-10-CM

## 2020-10-13 PROCEDURE — 99024 POSTOP FOLLOW-UP VISIT: CPT

## 2020-10-14 PROBLEM — I42.0 DILATED CARDIOMYOPATHY: Status: ACTIVE | Noted: 2020-10-12

## 2020-10-23 ENCOUNTER — OUTPATIENT (OUTPATIENT)
Dept: OUTPATIENT SERVICES | Facility: HOSPITAL | Age: 35
LOS: 1 days | Discharge: HOME | End: 2020-10-23

## 2020-10-23 DIAGNOSIS — D45 POLYCYTHEMIA VERA: ICD-10-CM

## 2020-10-26 ENCOUNTER — APPOINTMENT (OUTPATIENT)
Dept: HEMATOLOGY ONCOLOGY | Facility: CLINIC | Age: 35
End: 2020-10-26

## 2020-10-30 ENCOUNTER — APPOINTMENT (OUTPATIENT)
Dept: ELECTROPHYSIOLOGY | Facility: CLINIC | Age: 35
End: 2020-10-30

## 2020-11-12 ENCOUNTER — APPOINTMENT (OUTPATIENT)
Dept: CARDIOLOGY | Facility: CLINIC | Age: 35
End: 2020-11-12

## 2020-12-29 ENCOUNTER — APPOINTMENT (OUTPATIENT)
Dept: ELECTROPHYSIOLOGY | Facility: CLINIC | Age: 35
End: 2020-12-29

## 2021-01-15 ENCOUNTER — APPOINTMENT (OUTPATIENT)
Dept: ELECTROPHYSIOLOGY | Facility: CLINIC | Age: 36
End: 2021-01-15
Payer: MEDICAID

## 2021-01-15 PROCEDURE — 93296 REM INTERROG EVL PM/IDS: CPT

## 2021-01-15 PROCEDURE — 93295 DEV INTERROG REMOTE 1/2/MLT: CPT

## 2021-04-16 ENCOUNTER — APPOINTMENT (OUTPATIENT)
Dept: ELECTROPHYSIOLOGY | Facility: CLINIC | Age: 36
End: 2021-04-16
Payer: MEDICAID

## 2021-04-16 ENCOUNTER — NON-APPOINTMENT (OUTPATIENT)
Age: 36
End: 2021-04-16

## 2021-04-16 PROCEDURE — 93296 REM INTERROG EVL PM/IDS: CPT

## 2021-04-16 PROCEDURE — 93295 DEV INTERROG REMOTE 1/2/MLT: CPT

## 2021-05-23 NOTE — PROVIDER CONTACT NOTE (CRITICAL VALUE NOTIFICATION) - SITUATION
Pt resting comfortably awake, pt repositioned and provided blankets. Denies pain at this time  Equal chest rise/fall, alert, oriented speaking clearly. Call light within reach, bedlocked in lowest position  Will continue to monitor.        Justus Alfaro RN  05/23/21 [id number] Hematocrit level of 59.6%

## 2021-07-16 ENCOUNTER — NON-APPOINTMENT (OUTPATIENT)
Age: 36
End: 2021-07-16

## 2021-07-16 ENCOUNTER — APPOINTMENT (OUTPATIENT)
Dept: ELECTROPHYSIOLOGY | Facility: CLINIC | Age: 36
End: 2021-07-16
Payer: MEDICAID

## 2021-07-16 PROCEDURE — 93295 DEV INTERROG REMOTE 1/2/MLT: CPT

## 2021-07-16 PROCEDURE — 93296 REM INTERROG EVL PM/IDS: CPT

## 2021-10-15 ENCOUNTER — APPOINTMENT (OUTPATIENT)
Dept: ELECTROPHYSIOLOGY | Facility: CLINIC | Age: 36
End: 2021-10-15
Payer: MEDICAID

## 2021-10-15 ENCOUNTER — NON-APPOINTMENT (OUTPATIENT)
Age: 36
End: 2021-10-15

## 2021-10-15 PROCEDURE — 93295 DEV INTERROG REMOTE 1/2/MLT: CPT | Mod: NC

## 2021-10-15 PROCEDURE — 93296 REM INTERROG EVL PM/IDS: CPT | Mod: NC

## 2022-01-14 ENCOUNTER — APPOINTMENT (OUTPATIENT)
Dept: ELECTROPHYSIOLOGY | Facility: CLINIC | Age: 37
End: 2022-01-14

## 2022-01-27 ENCOUNTER — FORM ENCOUNTER (OUTPATIENT)
Age: 37
End: 2022-01-27

## 2022-02-14 ENCOUNTER — APPOINTMENT (OUTPATIENT)
Dept: ELECTROPHYSIOLOGY | Facility: CLINIC | Age: 37
End: 2022-02-14

## 2022-02-22 ENCOUNTER — FORM ENCOUNTER (OUTPATIENT)
Age: 37
End: 2022-02-22

## 2022-03-02 ENCOUNTER — INPATIENT (INPATIENT)
Facility: HOSPITAL | Age: 37
LOS: 1 days | Discharge: ANOTHER HOSPITAL | DRG: 303 | End: 2022-03-04
Attending: INTERNAL MEDICINE | Admitting: HOSPITALIST
Payer: MEDICAID

## 2022-03-02 VITALS
DIASTOLIC BLOOD PRESSURE: 68 MMHG | OXYGEN SATURATION: 98 % | TEMPERATURE: 98 F | SYSTOLIC BLOOD PRESSURE: 106 MMHG | HEART RATE: 86 BPM | RESPIRATION RATE: 18 BRPM

## 2022-03-02 DIAGNOSIS — D75.1 SECONDARY POLYCYTHEMIA: ICD-10-CM

## 2022-03-02 DIAGNOSIS — I10 ESSENTIAL (PRIMARY) HYPERTENSION: ICD-10-CM

## 2022-03-02 DIAGNOSIS — R93.1 ABNORMAL FINDINGS ON DIAGNOSTIC IMAGING OF HEART AND CORONARY CIRCULATION: ICD-10-CM

## 2022-03-02 DIAGNOSIS — E78.5 HYPERLIPIDEMIA, UNSPECIFIED: ICD-10-CM

## 2022-03-02 DIAGNOSIS — E11.9 TYPE 2 DIABETES MELLITUS WITHOUT COMPLICATIONS: ICD-10-CM

## 2022-03-02 DIAGNOSIS — Z29.9 ENCOUNTER FOR PROPHYLACTIC MEASURES, UNSPECIFIED: ICD-10-CM

## 2022-03-02 DIAGNOSIS — I50.20 UNSPECIFIED SYSTOLIC (CONGESTIVE) HEART FAILURE: ICD-10-CM

## 2022-03-02 DIAGNOSIS — I51.89 OTHER ILL-DEFINED HEART DISEASES: ICD-10-CM

## 2022-03-02 LAB
ALBUMIN SERPL ELPH-MCNC: 3.7 G/DL — SIGNIFICANT CHANGE UP (ref 3.3–5)
ALP SERPL-CCNC: 146 U/L — HIGH (ref 40–120)
ALT FLD-CCNC: 21 U/L — SIGNIFICANT CHANGE UP (ref 10–45)
ANION GAP SERPL CALC-SCNC: 12 MMOL/L — SIGNIFICANT CHANGE UP (ref 5–17)
AST SERPL-CCNC: 38 U/L — SIGNIFICANT CHANGE UP (ref 10–40)
BILIRUB SERPL-MCNC: 1.6 MG/DL — HIGH (ref 0.2–1.2)
BUN SERPL-MCNC: 13 MG/DL — SIGNIFICANT CHANGE UP (ref 7–23)
CALCIUM SERPL-MCNC: 9.8 MG/DL — SIGNIFICANT CHANGE UP (ref 8.4–10.5)
CHLORIDE SERPL-SCNC: 98 MMOL/L — SIGNIFICANT CHANGE UP (ref 96–108)
CO2 SERPL-SCNC: 25 MMOL/L — SIGNIFICANT CHANGE UP (ref 22–31)
CREAT SERPL-MCNC: 1.33 MG/DL — HIGH (ref 0.5–1.3)
EGFR: 71 ML/MIN/1.73M2 — SIGNIFICANT CHANGE UP
GLUCOSE BLDC GLUCOMTR-MCNC: 128 MG/DL — HIGH (ref 70–99)
GLUCOSE BLDC GLUCOMTR-MCNC: 87 MG/DL — SIGNIFICANT CHANGE UP (ref 70–99)
GLUCOSE BLDC GLUCOMTR-MCNC: 97 MG/DL — SIGNIFICANT CHANGE UP (ref 70–99)
GLUCOSE SERPL-MCNC: 79 MG/DL — SIGNIFICANT CHANGE UP (ref 70–99)
HCT VFR BLD CALC: 55.7 % — HIGH (ref 39–50)
HGB BLD-MCNC: 16.9 G/DL — SIGNIFICANT CHANGE UP (ref 13–17)
INR BLD: 1.3 RATIO — HIGH (ref 0.88–1.16)
MCHC RBC-ENTMCNC: 24.5 PG — LOW (ref 27–34)
MCHC RBC-ENTMCNC: 30.3 GM/DL — LOW (ref 32–36)
MCV RBC AUTO: 80.6 FL — SIGNIFICANT CHANGE UP (ref 80–100)
NRBC # BLD: 0 /100 WBCS — SIGNIFICANT CHANGE UP (ref 0–0)
PLATELET # BLD AUTO: 252 K/UL — SIGNIFICANT CHANGE UP (ref 150–400)
POTASSIUM SERPL-MCNC: 4.2 MMOL/L — SIGNIFICANT CHANGE UP (ref 3.5–5.3)
POTASSIUM SERPL-SCNC: 4.2 MMOL/L — SIGNIFICANT CHANGE UP (ref 3.5–5.3)
PROT SERPL-MCNC: 8.3 G/DL — SIGNIFICANT CHANGE UP (ref 6–8.3)
PROTHROM AB SERPL-ACNC: 15 SEC — HIGH (ref 10.5–13.4)
RBC # BLD: 6.91 M/UL — HIGH (ref 4.2–5.8)
RBC # FLD: 19.8 % — HIGH (ref 10.3–14.5)
SODIUM SERPL-SCNC: 135 MMOL/L — SIGNIFICANT CHANGE UP (ref 135–145)
WBC # BLD: 6.99 K/UL — SIGNIFICANT CHANGE UP (ref 3.8–10.5)
WBC # FLD AUTO: 6.99 K/UL — SIGNIFICANT CHANGE UP (ref 3.8–10.5)

## 2022-03-02 PROCEDURE — 93010 ELECTROCARDIOGRAM REPORT: CPT

## 2022-03-02 PROCEDURE — 99223 1ST HOSP IP/OBS HIGH 75: CPT

## 2022-03-02 PROCEDURE — 71046 X-RAY EXAM CHEST 2 VIEWS: CPT | Mod: 26

## 2022-03-02 PROCEDURE — 93284 PRGRMG EVAL IMPLANTABLE DFB: CPT | Mod: 26

## 2022-03-02 RX ORDER — METOPROLOL TARTRATE 50 MG
1 TABLET ORAL
Qty: 0 | Refills: 0 | DISCHARGE

## 2022-03-02 RX ORDER — ATORVASTATIN CALCIUM 80 MG/1
40 TABLET, FILM COATED ORAL AT BEDTIME
Refills: 0 | Status: DISCONTINUED | OUTPATIENT
Start: 2022-03-02 | End: 2022-03-04

## 2022-03-02 RX ORDER — METOPROLOL TARTRATE 50 MG
25 TABLET ORAL DAILY
Refills: 0 | Status: DISCONTINUED | OUTPATIENT
Start: 2022-03-02 | End: 2022-03-04

## 2022-03-02 RX ORDER — HEPARIN SODIUM 5000 [USP'U]/ML
5000 INJECTION INTRAVENOUS; SUBCUTANEOUS EVERY 8 HOURS
Refills: 0 | Status: DISCONTINUED | OUTPATIENT
Start: 2022-03-02 | End: 2022-03-04

## 2022-03-02 RX ORDER — HYDRALAZINE HCL 50 MG
1 TABLET ORAL
Qty: 0 | Refills: 0 | DISCHARGE

## 2022-03-02 RX ORDER — PANTOPRAZOLE SODIUM 20 MG/1
40 TABLET, DELAYED RELEASE ORAL
Refills: 0 | Status: DISCONTINUED | OUTPATIENT
Start: 2022-03-02 | End: 2022-03-04

## 2022-03-02 RX ORDER — ISOSORBIDE MONONITRATE 60 MG/1
30 TABLET, EXTENDED RELEASE ORAL DAILY
Refills: 0 | Status: DISCONTINUED | OUTPATIENT
Start: 2022-03-02 | End: 2022-03-04

## 2022-03-02 RX ORDER — FUROSEMIDE 40 MG
80 TABLET ORAL DAILY
Refills: 0 | Status: DISCONTINUED | OUTPATIENT
Start: 2022-03-02 | End: 2022-03-02

## 2022-03-02 RX ORDER — ASPIRIN/CALCIUM CARB/MAGNESIUM 324 MG
81 TABLET ORAL DAILY
Refills: 0 | Status: DISCONTINUED | OUTPATIENT
Start: 2022-03-02 | End: 2022-03-04

## 2022-03-02 RX ORDER — SACUBITRIL AND VALSARTAN 24; 26 MG/1; MG/1
1 TABLET, FILM COATED ORAL
Refills: 0 | Status: DISCONTINUED | OUTPATIENT
Start: 2022-03-02 | End: 2022-03-02

## 2022-03-02 RX ORDER — HYDRALAZINE HCL 50 MG
25 TABLET ORAL EVERY 8 HOURS
Refills: 0 | Status: DISCONTINUED | OUTPATIENT
Start: 2022-03-02 | End: 2022-03-02

## 2022-03-02 RX ADMIN — HEPARIN SODIUM 5000 UNIT(S): 5000 INJECTION INTRAVENOUS; SUBCUTANEOUS at 21:13

## 2022-03-02 RX ADMIN — ATORVASTATIN CALCIUM 40 MILLIGRAM(S): 80 TABLET, FILM COATED ORAL at 21:13

## 2022-03-02 RX ADMIN — HEPARIN SODIUM 5000 UNIT(S): 5000 INJECTION INTRAVENOUS; SUBCUTANEOUS at 14:44

## 2022-03-02 RX ADMIN — Medication 81 MILLIGRAM(S): at 14:45

## 2022-03-02 NOTE — H&P ADULT - HISTORY OF PRESENT ILLNESS
CC: 37 y/o M presenting as transfer for cardiac MRI    HPI: 37M hx HFrEF 2/2 NICM with EF 20% s/p BIV ICD([employer], MRI conditional), covid 1/2022, presenting as transfer for cardiac MRI. Patient poor historian and no consistent summary in transfer documents. Per patient, presented to OSH for SOB after having ran out of medications for 1 week. On work up found to have "lung clots." Underwent hypercoaguable work up which was negative but imaging c/w right atrial mass. Concern for myxoma so transferred for cardiac MRI for further clarification. Currently patient denies cp/sob/n/v/le edema.      CC: 35 y/o M presenting as transfer for cardiac MRI    HPI: 36M hx HFrEF 2/2 NICM with EF 20% s/p BIV ICD([employer], MRI conditional), covid 1/2022, presenting as transfer for cardiac MRI. Patient poor historian and no consistent summary in transfer documents. Per patient, presented to OSH for SOB after having ran out of medications for 1 week. On work up found to have "lung clots." Underwent hypercoaguable work up which was negative but imaging c/w right atrial mass. Concern for myxoma so transferred for cardiac MRI for further clarification. Currently patient denies cp/sob/n/v/le edema.

## 2022-03-02 NOTE — H&P ADULT - NSICDXPASTMEDICALHX_GEN_ALL_CORE_FT
PAST MEDICAL HISTORY:  DM (diabetes mellitus)     Heart failure with reduced ejection fraction     HLD (hyperlipidemia)     HTN (hypertension)

## 2022-03-02 NOTE — PROCEDURE NOTE - INTERROGATION NOTE: THRESHOLD AT (MS)
Chief Complaint : 4 month PT f/u            New Hx/Sx: UTI      Records/Orders/Proced: none      Pt Contacted: no      At Rooming: normal       
0.5

## 2022-03-02 NOTE — H&P ADULT - CONSTITUTIONAL
[facility]    Patient - Coby Powell   MRN -  [id number]   Acct # - [de-identified]   - 1942      Date of Admission -  2020  8:51 PM  Date of evaluation -  2020  Room - 4D--ENRIQUE Whitley MD Primary Care Physician - Raynell Gottron,      Problem List      Active Hospital Problems    Diagnosis Date Noted    Mucus plugging of bronchi [J98.09]     Moderate malnutrition ([address] [E44.0] 2020    Malignant neoplasm of upper lobe of lung ([address] [C34.10]     Acute on chronic respiratory failure with hypoxia (HCC) [J96.21]     Status post thoracentesis [Z98.890]     Bilateral pleural effusion [J90] 2020     Chief complaint   Shortness of breath   HPI    66 y.o. with PMH of HTN, CAD, HLD, COPD, PAF, brought in by his ex-wife due to ongoing generalized fatigue for the past 2-3 weeks. Patient lives alone but his ex-wife checked on him today, found patient unable to get up from the couch which prompted the ED visit. Patient was recently diagnosed with lung cancer stage I and was scheduled to initiate radiation therapy when he was found to have bilateral pleural effusions which lead to cancellation of his therapy. Patient is scheduled to get pleural effusions tapped at 0930 today (2020) and then is scheduled to get radiation therapy afterwards.     ED: presented afebrile and hemodynamically stable, hypertensive to 144/77. Placed on 2L NC, saturating >95%. Labs revealed Cr 1.7 (baseline 0.70), elevated LFTs, elevated trop at 0.038, anemia to 11.5, UA negative for infection. CXR showed congestion with complete opacification of the LLL likely due to combination of consolidation and pleural fluid. CT chest showed bilateral pleural effusions, left upper and lower lobe consolidations, nodular densities in the right lung, cardiomegaly with small pericardial effusion.  CT A/P revealed small amount of ascites in the pelvis, hepatomegaly, cholelithiasis, and bilateral non-obstructive nephrolithiasis. COVID negative.     Patient follows with Dr. Ramiro Mckeon, radiation oncology. He has an extensive smoking history, 2-3 ppd since he was 6years old. Patient is supposed to be on oxygen at home but does not use it given that he continues to smoke. Patient reports that he drink alcohol daily with 3-4 beers daily and Julius Beam up to a pint/day.      Per patient and his ex-wife, he has been feeling increasingly fatigued, limiting his ability to move around. He reportedly had a fall out of his couch the morning prior to admission, he could not get up on his own so he had to call 911 who helped him get back up. He has not been able to eat or drink much in the past 2 to 3 weeks due to weakness. he reports having dyspnea at baseline which is recently progressively worsening. Patient reports having no chest pain, dizziness, diaphoresis, nausea vomiting, abdominal pain, dysuria, headaches, subjective fevers or chills. Per his ex-wife he always has some edema in his legs which she attributes to his venous problems. Events in the last 24hours   - Extubated this AM  - Continues on HFNC 60% 60LPM  - Bronch yesterday per Dr. Gertrude Monte  - Levo gtt DC- BP stable   - Tmax 99.1  -All systems reviewed.      PMHx   Past Medical History      Diagnosis Date    Arthritis     Atrial fibrillation ([address]     CAD (coronary artery disease)     [facility]     COPD (chronic obstructive pulmonary disease) ([facility]     Hyperlipidemia     Hypertension       Past Surgical History        Procedure Laterality Date    EYE SURGERY      Cataracts removed    JOINT REPLACEMENT      Hip    LUNG BIOPSY      VASCULAR SURGERY      Stents in leg     Meds    Current Medications    amLODIPine  10 mg Per NG tube Daily    carvedilol  6.25 mg Per NG tube BID WC    folic acid  1 mg Per NG tube Daily    methocarbamol  1,000 mg Per NG tube TID    spironolactone  25 mg Per NG tube Daily    chlorhexidine  15 mL Mouth/Throat BID    famotidine (PEPCID) injection  20 mg Intravenous BID    sodium chloride flush  10 mL Intravenous 2 times per day    [Held by provider] apixaban  5 mg Oral BID    lidocaine  1 patch Transdermal Daily    [Held by provider] losartan  100 mg Oral Daily    [Held by provider] pravastatin  20 mg Oral Daily     albuterol, fentanNYL, sodium chloride flush, acetaminophen **OR** acetaminophen, polyethylene glycol, promethazine **OR** ondansetron  IV Drips/Infusions   norepinephrine Stopped (09/19/20 1904)    propofol 15 mcg/kg/min (09/20/20 2770)     Home Medications  Medications Prior to Admission: vitamin D (CHOLECALCIFEROL) 25 MCG (1000 UT) TABS tablet, Take 2,000 Units by mouth daily  amLODIPine (NORVASC) 10 MG tablet, Take 10 mg by mouth daily  apixaban (ELIQUIS) 5 MG TABS tablet, Take by mouth 2 times daily  carvedilol (COREG) 6.25 MG tablet, Take 6.25 mg by mouth 2 times daily (with meals)  losartan (COZAAR) 100 MG tablet, Take 100 mg by mouth daily  methocarbamol (ROBAXIN) 500 MG tablet, Take 1,000 mg by mouth 3 times daily  Multiple Vitamins-Minerals (ICAPS AREDS 2 PO), Take 1 capsule by mouth 2 times daily  pravastatin (PRAVACHOL) 40 MG tablet, Take 20 mg by mouth daily  diclofenac sodium (VOLTAREN) 1 % GEL, Apply 4 g topically 4 times daily as needed for Pain  lidocaine (LIDODERM) 5 %, Place 1 patch onto the skin daily 12 hours on, 12 hours off.  sildenafil (VIAGRA) 100 MG tablet, Take 100 mg by mouth as needed for Erectile Dysfunction  spironolactone (ALDACTONE) 25 MG tablet, Take 25 mg by mouth daily  Diet    Diet NPO Effective Now  Allergies    Patient has no known allergies.   Social History     Social History     Socioeconomic History    Marital status: Single     Spouse name: Not on file    Number of children: 0    Years of education: Not on file    Highest education level: Not on file   Occupational History    Not on file   Social Needs    Financial resource strain: Not on file    Food insecurity     Worry: Not on file     Inability: Not on file    Transportation needs     Medical: Not on file     Non-medical: Not on file   Tobacco Use    Smoking status: Current Every Day Smoker     Packs/day: 0.50     Years: 68.00     Pack years: 34.00     Types: Cigarettes     Start date: 1/1/1950    Smokeless tobacco: Never Used   Substance and Sexual Activity    Alcohol use: Yes     Alcohol/week: 3.0 standard drinks     Types: 3 Cans of beer per week    Drug use: Never    Sexual activity: Not on file   Lifestyle    Physical activity     Days per week: Not on file     Minutes per session: Not on file    Stress: Not on file   Relationships    Social connections     Talks on phone: Not on file     Gets together: Not on file     Attends Islam service: Not on file     Active member of club or organization: Not on file     Attends meetings of clubs or organizations: Not on file     Relationship status: Not on file    Intimate partner violence     Fear of current or ex partner: Not on file     Emotionally abused: Not on file     Physically abused: Not on file     Forced sexual activity: Not on file   Other Topics Concern    Not on file   Social History Narrative    Not on file     Family History          Problem Relation Age of Onset    Liver Disease Mother        Vitals     height is 6' (1.829 m) and weight is 159 lb 9.8 oz (72.4 kg). His bladder temperature is 99 °F (37.2 °C). His blood pressure is 119/52 (abnormal) and his pulse is 68. His respiration is 22 and oxygen saturation is 93%. Body mass index is 21.65 kg/m². SUPPLEMENTAL O2: O2 Flow Rate (L/min): 60 L/min     I/O        Intake/Output Summary (Last 24 hours) at 9/20/2020 1007  Last data filed at 9/20/2020 0405  Gross per 24 hour   Intake 1591.25 ml   Output 700 ml   Net 891.25 ml     I/O last 3 completed shifts: In: 1591.3 [P.O.:230;  I.V.:1361.3]  Out: 700 [Urine:700]   Patient Vitals for the past 96 hrs (Last 3 readings):   Weight   09/20/20 0340 159 lb 9.8 oz (72.4 kg)   09/19/20 1703 157 lb 13.6 oz (71.6 kg)   09/19/20 0307 141 lb 12.1 oz (64.3 kg)       Exam   General Appearance: Patient appears malnourished. Elderly and frail. Saturating well on HFNC  HEENT: Normal, Head is normocephalic, atraumatic. Oropharynx is clear and moist.  No oral thrush. PERRL  Neck - Supple, No JVD present. No tracheal deviation present. Lungs - Bilateral air entry present. Rhonchorous breath sounds bilaterally with diminished BLL. No wheezes, rales or rhonchi. Cardiovascular - Heart sounds are normal.  Regular rhythm normal rate without murmur, gallop or rub. Abdomen - Soft, nontender, nondistended, no masses or organomegaly  Neurologic - Awake, alert, oriented. There are no focal motor or sensory deficits  Extremities - No cyanosis, clubbing or edema. Musculoskeletal: Normal range of motion. Patient exhibits no tenderness. Lymphadenopathy:  No cervical adenopathy. Psychiatric: Patient  has a normal mood and affect. Skin - No bruising or bleeding.  Pale    Labs  - Old records and notes have been reviewed in [facility]   ABG  Lab Results   Component Value Date    PH 7.41 09/18/2020    PO2 64 09/18/2020    PCO2 48 09/18/2020    HCO3 30 09/18/2020    O2SAT 92 09/18/2020     Lab Results   Component Value Date    IFIO2 15 09/18/2020     CBC  Recent Labs     09/18/20  0602 09/19/20  0759   WBC 7.0  --    RBC 3.31*  --    HGB 10.6* 12.0*   HCT 34.3* 39.0*   .6*  --    MCH 32.0  --    MCHC 30.9*  --      --    MPV 10.3  --       BMP  Recent Labs     09/18/20  0602 09/18/20  0858   *  --    K 4.1  --      --    CO2 30  --    BUN 31*  --    CREATININE 1.2  --    GLUCOSE 69*  --    PHOS  --  3.4   CALCIUM 8.7  --      LFT  Recent Labs     09/18/20  0602   *   *   BILITOT 0.6   ALKPHOS 149*     TROP  Lab Results   Component Value Date    TROPONINT 0.042 09/18/2020    TROPONINT 0.037 09/17/2020    TROPONINT 0.041 09/17/2020     BNP  No results for input(s): BNP in the last 72 hours. Lactic Acid  No results for input(s): LACTA in the last 72 hours. INR  No results for input(s): INR, PROTIME in the last 72 hours. PTT  No results for input(s): APTT in the last 72 hours. Glucose  No results for input(s): POCGLU in the last 72 hours. UA   Recent Labs     09/18/20  1245   COLORU LIGHT YELLOW   . Thoracentesis fluid analysis results. Performed on: 9/17/2020  Side: right side of chest .  By IR: Yes  Amount of pleural fluid drained: 0.92L    Lab Results   Component Value Date    COLORU LIGHT YELLOW 09/18/2020    COLORU DK YELLOW 09/16/2020    LDFL 54 09/18/2020    PROTEINFL 1.4 09/18/2020     Lab Results   Component Value Date    PROT 5.2 09/18/2020     CYTOLOGY: pending     Serum LDH:  LDH ratio i.e Pleural fluid LDH/ Serum LDH: no pleural LD  Total protein ratio i.e Pleural fluid Total protein/ Serum Total protein: 5.9/1.4= 3.7    Pleural fluid cultures were negative so far. Gram stain of pleural fluid is negative for any organisms. Thoracentesis fluid analysis results. Performed on: 9/18/20  Side: left side of chest .  By IR: Yes  Amount of pleural fluid drained: 1.4L    Lab Results   Component Value Date    COLORU LIGHT YELLOW 09/18/2020    COLORU DK YELLOW 09/16/2020    LDFL 54 09/18/2020    PROTEINFL 1.4 09/18/2020     Lab Results   Component Value Date    PROT 5.2 09/18/2020     CYTOLOGY: pending     Serum LDH:  LDH ratio i.e Pleural fluid LDH/ Serum LDH: 199/54= 3.7  Total protein ratio i.e Pleural fluid Total protein/ Serum Total protein: 5.2/1.4= 0.81    Pleural fluid cultures were negative so far. Gram stain of pleural fluid is negative for any organisms. Cultures      Pneumonia Panel, Molecular [[id number]]   Collected: 09/19/20 1830    Order Status: Completed  Specimen: BAL- Bronch.  Lavage  Updated: 09/19/20 2232     Source  ET aspirates     Specimen Acceptability  see below     Comment:  Acceptable, >=25 WBCs/LPF        Acinetobacter calcoaceticus-baumannii by PCR  Not Detected     Enterobacter cloacae complex by PCR  Not Detected     Escherichia coli by PCR  Not Detected     Haemophilus Influenzae by PCR  Not Detected     Klebsiella aerogenes by PCR  Not Detected     Klebsiella oxytoca by PCR  Not Detected     Klebsiella pneumoniae by PCR  Not Detected     Moraxella catarrhalis by PCR  Not Detected     Proteus species by PCR  Not Detected     Pseudomonas aeruginosa by PCR  Not Detected     Serratia marcescens by PCR  Not Detected     Staph aureus by PCR  Not Detected     Strep agalactiae by PCR  Not Detected     Strep pneumoniae by PCR  Not Detected     Strep pyogenes by PCR  Not Detected     Resistant gene ctx-m by PCR  NA     Resistant gene imp by PCR  NA     Resistant gene kpc by PCR  NA     Resistant gene meca/c & mrej by PCR  NA     Resistant gene ndm by PCR  NA     Resistant gene oxa-48-like by pcr  NA     Resistant gene vim by PCR  NA     Chlamydia pneumoniae By PCR  Not Detected     Legionella Pneumophilia By PCR  Not Detected     Mycoplasma pneumoniae by PCR  Not Detected     Adenovirus by PCR  Not Detected     CORONAVIRUS PCR  Not Detected     Metapneumovirus by PCR  Not Detected     Rhinovirus Enterovirus PCR  Not Detected     Influenza A by PCR  Not Detected     Influenza B by PCR  Not Detected     Parainfluenza virus by PCR  Not Detected     Respiratory Syncytial Virus by PCR  Not Detected      (-) MRSA  (-) VRE    Culture, Body Fluid [[id number]]   Collected: 09/18/20 1245    Order Status: Completed  Specimen: Body Fluid from Pleural Fluid  Updated: 09/20/20 0835     Body Fluid Culture, Sterile  No growth-preliminary No growth     Gram Stain Result  Few segmented neutrophils observed. No bacteria seen.  performed on cytospun specimen      Culture, Urine [[id number]]   Collected: 09/19/20 1730    Order Status: Completed  Specimen: Urine  Updated: 09/20/20 2060 Addendum by Dr. Tosha Oviedo MD:  I have seen and examined the patient independently. Face to face evaluation and examination was performed. The above evaluation and note has been reviewed. Labs and radiographs were reviewed. I Have discussed with DANIEL Leonard- CNP about this patient in detail. The above assessment and plan has been reviewed. Please see my modifications mentioned below. My modifications:  He is on Hi flow. Not in any distress. S/p Bronch by Dr. Khan Running with removal of mucus plugs. Molecular pneumonia panel is negative so far. Follow pleural fluid cytology. Jarrett Barlow MD 9/20/2020 12:42 PM     Addendum done on 9/20/20 at 3:06 PM EDT:  -Will transfer patient to ICU step down I.e 4K or 3B. -I spoke with Dr. Alyse Galdamez MD from hospitalist service and given sign out on this patient to assume as primary. He graciously agreed to do so. detailed exam

## 2022-03-02 NOTE — H&P ADULT - ASSESSMENT
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

## 2022-03-02 NOTE — ASU PATIENT PROFILE, ADULT - FALL HARM RISK - UNIVERSAL INTERVENTIONS
Bed in lowest position, wheels locked, appropriate side rails in place/Call bell, personal items and telephone in reach/Instruct patient to call for assistance before getting out of bed or chair/Non-slip footwear when patient is out of bed/[place] to call system/Physically safe environment - no spills, clutter or unnecessary equipment/Purposeful Proactive Rounding/Room/bathroom lighting operational, light cord in reach

## 2022-03-02 NOTE — ASU PATIENT PROFILE, ADULT - NS TRANSFER RESPONSE BELONGING
Pt has a vallet and green card with the pt refuses to send it to the locker explained to pt that hospital is not responsible for the loss of any property/yes

## 2022-03-02 NOTE — H&P ADULT - NSHPLABSRESULTS_GEN_ALL_CORE
EKG personally reviewed: A sensed V apced  CXR personally Reviewed: ICD in place, no effusions appreciated

## 2022-03-02 NOTE — H&P ADULT - PROBLEM SELECTOR PLAN 2
EF 20%, 2/2 to NICM. s/p [employer] BIV ICD, Currently on RA  -continue metop 25mg  -continue entresto 24/26  -continue isosorbide 30mg  -continue hydral 25mg q8  -continue lipitor 40mg

## 2022-03-02 NOTE — H&P ADULT - PROBLEM SELECTOR PLAN 7
-[facility]    Dispo Planning: Back to [facility] post Cardiac MRI

## 2022-03-03 DIAGNOSIS — Z02.9 ENCOUNTER FOR ADMINISTRATIVE EXAMINATIONS, UNSPECIFIED: ICD-10-CM

## 2022-03-03 DIAGNOSIS — N18.2 CHRONIC KIDNEY DISEASE, STAGE 2 (MILD): ICD-10-CM

## 2022-03-03 LAB
ANION GAP SERPL CALC-SCNC: 13 MMOL/L — SIGNIFICANT CHANGE UP (ref 5–17)
BASOPHILS # BLD AUTO: 0.1 K/UL — SIGNIFICANT CHANGE UP (ref 0–0.2)
BASOPHILS NFR BLD AUTO: 1.5 % — SIGNIFICANT CHANGE UP (ref 0–2)
BUN SERPL-MCNC: 14 MG/DL — SIGNIFICANT CHANGE UP (ref 7–23)
CALCIUM SERPL-MCNC: 9.4 MG/DL — SIGNIFICANT CHANGE UP (ref 8.4–10.5)
CHLORIDE SERPL-SCNC: 97 MMOL/L — SIGNIFICANT CHANGE UP (ref 96–108)
CO2 SERPL-SCNC: 25 MMOL/L — SIGNIFICANT CHANGE UP (ref 22–31)
CREAT SERPL-MCNC: 1.51 MG/DL — HIGH (ref 0.5–1.3)
EGFR: 61 ML/MIN/1.73M2 — SIGNIFICANT CHANGE UP
EOSINOPHIL # BLD AUTO: 0.22 K/UL — SIGNIFICANT CHANGE UP (ref 0–0.5)
EOSINOPHIL NFR BLD AUTO: 3.2 % — SIGNIFICANT CHANGE UP (ref 0–6)
GLUCOSE BLDC GLUCOMTR-MCNC: 74 MG/DL — SIGNIFICANT CHANGE UP (ref 70–99)
GLUCOSE BLDC GLUCOMTR-MCNC: 77 MG/DL — SIGNIFICANT CHANGE UP (ref 70–99)
GLUCOSE BLDC GLUCOMTR-MCNC: 91 MG/DL — SIGNIFICANT CHANGE UP (ref 70–99)
GLUCOSE BLDC GLUCOMTR-MCNC: 99 MG/DL — SIGNIFICANT CHANGE UP (ref 70–99)
GLUCOSE SERPL-MCNC: 78 MG/DL — SIGNIFICANT CHANGE UP (ref 70–99)
HCT VFR BLD CALC: 52.3 % — HIGH (ref 39–50)
HGB BLD-MCNC: 16.1 G/DL — SIGNIFICANT CHANGE UP (ref 13–17)
IMM GRANULOCYTES NFR BLD AUTO: 0.4 % — SIGNIFICANT CHANGE UP (ref 0–1.5)
LYMPHOCYTES # BLD AUTO: 1.62 K/UL — SIGNIFICANT CHANGE UP (ref 1–3.3)
LYMPHOCYTES # BLD AUTO: 23.6 % — SIGNIFICANT CHANGE UP (ref 13–44)
MAGNESIUM SERPL-MCNC: 1.9 MG/DL — SIGNIFICANT CHANGE UP (ref 1.6–2.6)
MCHC RBC-ENTMCNC: 24.4 PG — LOW (ref 27–34)
MCHC RBC-ENTMCNC: 30.8 GM/DL — LOW (ref 32–36)
MCV RBC AUTO: 79.2 FL — LOW (ref 80–100)
MONOCYTES # BLD AUTO: 0.92 K/UL — HIGH (ref 0–0.9)
MONOCYTES NFR BLD AUTO: 13.4 % — SIGNIFICANT CHANGE UP (ref 2–14)
NEUTROPHILS # BLD AUTO: 3.97 K/UL — SIGNIFICANT CHANGE UP (ref 1.8–7.4)
NEUTROPHILS NFR BLD AUTO: 57.9 % — SIGNIFICANT CHANGE UP (ref 43–77)
NRBC # BLD: 0 /100 WBCS — SIGNIFICANT CHANGE UP (ref 0–0)
PLATELET # BLD AUTO: 230 K/UL — SIGNIFICANT CHANGE UP (ref 150–400)
POTASSIUM SERPL-MCNC: 4.2 MMOL/L — SIGNIFICANT CHANGE UP (ref 3.5–5.3)
POTASSIUM SERPL-SCNC: 4.2 MMOL/L — SIGNIFICANT CHANGE UP (ref 3.5–5.3)
RBC # BLD: 6.6 M/UL — HIGH (ref 4.2–5.8)
RBC # FLD: 19.4 % — HIGH (ref 10.3–14.5)
SARS-COV-2 RNA SPEC QL NAA+PROBE: SIGNIFICANT CHANGE UP
SODIUM SERPL-SCNC: 135 MMOL/L — SIGNIFICANT CHANGE UP (ref 135–145)
WBC # BLD: 6.86 K/UL — SIGNIFICANT CHANGE UP (ref 3.8–10.5)
WBC # FLD AUTO: 6.86 K/UL — SIGNIFICANT CHANGE UP (ref 3.8–10.5)

## 2022-03-03 PROCEDURE — 93287 PERI-PX DEVICE EVAL & PRGR: CPT | Mod: 26

## 2022-03-03 PROCEDURE — 75572 CT HRT W/3D IMAGE: CPT | Mod: 26

## 2022-03-03 PROCEDURE — 99232 SBSQ HOSP IP/OBS MODERATE 35: CPT

## 2022-03-03 PROCEDURE — 75561 CARDIAC MRI FOR MORPH W/DYE: CPT | Mod: 26

## 2022-03-03 RX ORDER — SODIUM CHLORIDE 9 MG/ML
3 INJECTION INTRAMUSCULAR; INTRAVENOUS; SUBCUTANEOUS EVERY 8 HOURS
Refills: 0 | Status: DISCONTINUED | OUTPATIENT
Start: 2022-03-03 | End: 2022-03-04

## 2022-03-03 RX ADMIN — HEPARIN SODIUM 5000 UNIT(S): 5000 INJECTION INTRAVENOUS; SUBCUTANEOUS at 06:10

## 2022-03-03 RX ADMIN — ISOSORBIDE MONONITRATE 30 MILLIGRAM(S): 60 TABLET, EXTENDED RELEASE ORAL at 11:12

## 2022-03-03 RX ADMIN — ATORVASTATIN CALCIUM 40 MILLIGRAM(S): 80 TABLET, FILM COATED ORAL at 21:09

## 2022-03-03 RX ADMIN — SODIUM CHLORIDE 3 MILLILITER(S): 9 INJECTION INTRAMUSCULAR; INTRAVENOUS; SUBCUTANEOUS at 13:38

## 2022-03-03 RX ADMIN — PANTOPRAZOLE SODIUM 40 MILLIGRAM(S): 20 TABLET, DELAYED RELEASE ORAL at 06:10

## 2022-03-03 RX ADMIN — SODIUM CHLORIDE 3 MILLILITER(S): 9 INJECTION INTRAMUSCULAR; INTRAVENOUS; SUBCUTANEOUS at 20:57

## 2022-03-03 RX ADMIN — HEPARIN SODIUM 5000 UNIT(S): 5000 INJECTION INTRAVENOUS; SUBCUTANEOUS at 13:38

## 2022-03-03 RX ADMIN — Medication 25 MILLIGRAM(S): at 11:12

## 2022-03-03 RX ADMIN — HEPARIN SODIUM 5000 UNIT(S): 5000 INJECTION INTRAVENOUS; SUBCUTANEOUS at 21:09

## 2022-03-03 RX ADMIN — Medication 81 MILLIGRAM(S): at 11:12

## 2022-03-03 NOTE — CHART NOTE - NSCHARTNOTEFT_GEN_A_CORE
Pt went to cardiac MRI  Dr. Carrasquillo (Radiology) called, It is very limited cardiac MRI because of too much artifact (wire and ICD).   And He recommended for EKG gated Cardiac CTA with IV contrast.   D/w Dr. Richard and relayed message to Dr. Carrasquillo.   Cardiac CTA with IV contrast order in now (Cr 1.5 and GFR 61 today)

## 2022-03-03 NOTE — PROCEDURE NOTE - NSPROCNAME_GEN_A_CORE
CRT-D (Cardiac Resynchronization Therapy with Defibrillation Capabilities) Interrogation Note
CRT-D (Cardiac Resynchronization Therapy with Defibrillation Capabilities) Interrogation Note
ICD Interrogation Note

## 2022-03-03 NOTE — PROCEDURE NOTE - ADDITIONAL PROCEDURE DETAILS
-Indication for interrogation: Post MRI  -Presenting rhythm: SR with BiV pacing  -Measured data WNL, normal BiV ICD function, Pt is NOT pacemaker dependent  -Changes made: Device programmed back to original settings    Josias Fletcher PA-C/[place]  [id number]
-Indication for interrogation: Pre MRI  -Presenting rhythm: SR with BiV pacing  -Measured data WNL, normal BiV ICD function, Pt is NOT pacemaker dependent  -Changes made: Device programmed to MRI mode with pacing off  -Call post MRI to reprogram device    Josias Fletchre PA-C/[place]  [id number]
Indication: pre MRI check   Findings: patient found to be suboptimally BiV pacing, frequent AMS episodes noted 2/2 farfield R wave oversensing   Otherwise stable lead impedance, thresholds.   Changes made: The following was discussed with [facility] to improve BiV pacing.   1) Changed paced  AV delay to 200ms, changed post ventricular blanking to 160ms and sensed AV delay to 150ms.   2) Changed A, RV, and LV outputs to 2.5 from 4V to ensure 2x safety margin.   3) Turned SyncAV CRT Delta/shortest AV delay off

## 2022-03-04 ENCOUNTER — TRANSCRIPTION ENCOUNTER (OUTPATIENT)
Age: 37
End: 2022-03-04

## 2022-03-04 VITALS
SYSTOLIC BLOOD PRESSURE: 140 MMHG | RESPIRATION RATE: 18 BRPM | TEMPERATURE: 98 F | HEART RATE: 80 BPM | OXYGEN SATURATION: 99 % | DIASTOLIC BLOOD PRESSURE: 89 MMHG

## 2022-03-04 LAB
ANION GAP SERPL CALC-SCNC: 13 MMOL/L — SIGNIFICANT CHANGE UP (ref 5–17)
BUN SERPL-MCNC: 14 MG/DL — SIGNIFICANT CHANGE UP (ref 7–23)
CALCIUM SERPL-MCNC: 9.8 MG/DL — SIGNIFICANT CHANGE UP (ref 8.4–10.5)
CHLORIDE SERPL-SCNC: 97 MMOL/L — SIGNIFICANT CHANGE UP (ref 96–108)
CO2 SERPL-SCNC: 26 MMOL/L — SIGNIFICANT CHANGE UP (ref 22–31)
CREAT SERPL-MCNC: 1.2 MG/DL — SIGNIFICANT CHANGE UP (ref 0.5–1.3)
EGFR: 80 ML/MIN/1.73M2 — SIGNIFICANT CHANGE UP
GLUCOSE BLDC GLUCOMTR-MCNC: 85 MG/DL — SIGNIFICANT CHANGE UP (ref 70–99)
GLUCOSE BLDC GLUCOMTR-MCNC: 99 MG/DL — SIGNIFICANT CHANGE UP (ref 70–99)
GLUCOSE SERPL-MCNC: 114 MG/DL — HIGH (ref 70–99)
HCT VFR BLD CALC: 52.8 % — HIGH (ref 39–50)
HGB BLD-MCNC: 16.4 G/DL — SIGNIFICANT CHANGE UP (ref 13–17)
MCHC RBC-ENTMCNC: 25 PG — LOW (ref 27–34)
MCHC RBC-ENTMCNC: 31.1 GM/DL — LOW (ref 32–36)
MCV RBC AUTO: 80.5 FL — SIGNIFICANT CHANGE UP (ref 80–100)
NRBC # BLD: 0 /100 WBCS — SIGNIFICANT CHANGE UP (ref 0–0)
PLATELET # BLD AUTO: 224 K/UL — SIGNIFICANT CHANGE UP (ref 150–400)
POTASSIUM SERPL-MCNC: 4.4 MMOL/L — SIGNIFICANT CHANGE UP (ref 3.5–5.3)
POTASSIUM SERPL-SCNC: 4.4 MMOL/L — SIGNIFICANT CHANGE UP (ref 3.5–5.3)
RBC # BLD: 6.56 M/UL — HIGH (ref 4.2–5.8)
RBC # FLD: 19.4 % — HIGH (ref 10.3–14.5)
SODIUM SERPL-SCNC: 136 MMOL/L — SIGNIFICANT CHANGE UP (ref 135–145)
WBC # BLD: 8.68 K/UL — SIGNIFICANT CHANGE UP (ref 3.8–10.5)
WBC # FLD AUTO: 8.68 K/UL — SIGNIFICANT CHANGE UP (ref 3.8–10.5)

## 2022-03-04 PROCEDURE — 80048 BASIC METABOLIC PNL TOTAL CA: CPT

## 2022-03-04 PROCEDURE — 85027 COMPLETE CBC AUTOMATED: CPT

## 2022-03-04 PROCEDURE — U0003: CPT

## 2022-03-04 PROCEDURE — 83735 ASSAY OF MAGNESIUM: CPT

## 2022-03-04 PROCEDURE — 85025 COMPLETE CBC W/AUTO DIFF WBC: CPT

## 2022-03-04 PROCEDURE — 75572 CT HRT W/3D IMAGE: CPT

## 2022-03-04 PROCEDURE — 75561 CARDIAC MRI FOR MORPH W/DYE: CPT

## 2022-03-04 PROCEDURE — 85610 PROTHROMBIN TIME: CPT

## 2022-03-04 PROCEDURE — 93005 ELECTROCARDIOGRAM TRACING: CPT

## 2022-03-04 PROCEDURE — 99239 HOSP IP/OBS DSCHRG MGMT >30: CPT

## 2022-03-04 PROCEDURE — 82962 GLUCOSE BLOOD TEST: CPT

## 2022-03-04 PROCEDURE — 71046 X-RAY EXAM CHEST 2 VIEWS: CPT

## 2022-03-04 PROCEDURE — 80053 COMPREHEN METABOLIC PANEL: CPT

## 2022-03-04 RX ORDER — ASPIRIN/CALCIUM CARB/MAGNESIUM 324 MG
1 TABLET ORAL
Qty: 0 | Refills: 0 | DISCHARGE
Start: 2022-03-04

## 2022-03-04 RX ADMIN — ISOSORBIDE MONONITRATE 30 MILLIGRAM(S): 60 TABLET, EXTENDED RELEASE ORAL at 13:33

## 2022-03-04 RX ADMIN — Medication 25 MILLIGRAM(S): at 05:51

## 2022-03-04 RX ADMIN — HEPARIN SODIUM 5000 UNIT(S): 5000 INJECTION INTRAVENOUS; SUBCUTANEOUS at 05:51

## 2022-03-04 RX ADMIN — Medication 81 MILLIGRAM(S): at 05:51

## 2022-03-04 RX ADMIN — PANTOPRAZOLE SODIUM 40 MILLIGRAM(S): 20 TABLET, DELAYED RELEASE ORAL at 05:51

## 2022-03-04 RX ADMIN — SODIUM CHLORIDE 3 MILLILITER(S): 9 INJECTION INTRAMUSCULAR; INTRAVENOUS; SUBCUTANEOUS at 05:46

## 2022-03-04 NOTE — DISCHARGE NOTE NURSING/CASE MANAGEMENT/SOCIAL WORK - PATIENT PORTAL LINK FT
You can access the FollowMyHealth Patient Portal offered by [facility] by registering at the following website: [email or web address]. By joining [employer]s FollowMyHealth portal, you will also be able to view your health information using other applications (apps) compatible with our system.

## 2022-03-04 NOTE — DISCHARGE NOTE PROVIDER - HOSPITAL COURSE
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 During hospital course, Cardiac MRI  limited due to artifact from ICD and wires. Cardiac CTA with IV contrast shows-> No CT evidence of right atrial mass. Streak artifact from ICD leads   slightly limits evaluation.     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 for cardiac MRI. During hospital course, Cardiac MRI  limited due to artifact from ICD and wires. Cardiac CTA with IV contrast shows-> No CT evidence of right atrial mass. Streak artifact from ICD leads   slightly limits evaluation. Pt was evaluated by medicine team decided to transfer back to [facility] after discussion with Dr. Chong for further treatment.     Cardiac MRI < from: MR Cardiac w/wo IV Cont (03.03.22 @ 10:43) >    INTERPRETATION:  CLINICAL INDICATION: Right atrial mass, concerning for myxoma.   Magnetic resonance imaging of the heart was performed using black and/or bright blood sequences and following uncomplicated intravenous administration of 13 cc of Gadavist. 2 cc of contrast was discarded.  No prior similar studies are available for comparison.  Very limited to nondiagnostic study due to significant susceptibility artifact overlying the heart from the left chest wall cardiac device and its leads within the heart.  The ascending aorta at the level of the main pulmonary artery measures about 3 cm and the descending thoracic aorta measures about 2.3 cm. The main pulmonary artery measures about 3 cm. Mediastinal lymph nodes are   not well evaluated on this cardiac MRI.  Trace pericardial effusion. No pleural effusion. Heart size appears enlarged. There is suggestion of decreased function or hypokinesia of the left ventricular myocardium given the limitations of this study.  Questionable 2 cm hypointense lesion in the region of the right atrial appendage seen on the short axis delayed images, image 7of series 2 although not well evaluated due to the limitations of this study.  Suggestion of areas of myocardial late gadolinium enhancement involving the inferolateral and lateral segments of the mid left ventricle which may involve the outer wall of the myocardium.  Impression: Limited to nondiagnostic study secondary to susceptibility artifact overlying the heart.  Questionable 2 cm hypointense lesion in the region of the right atrial appendage. ECG gated cardiac CT angiogram is recommended for complete evaluation.    Suggestion of areas of myocardial late gadolinium enhancement involving the inferolateral and lateral segments of the mid left ventricle given the limitations of the study. --- End of Report ---  AARON REED MD; Attending Radiologist This document has been electronically signed. Mar  3 2022  1:21PM < end of copied text >    < from: CT Heart without Coronaries w/ IV Cont (03.03.22 @ 15:47) >  These findings were discussed with Dr. Floyd Garrido from Cardiology from [facility] on 3/3/2022 at 4:30PM.    --- End of Report ---*** END OF ADDENDUM***  PROCEDURE DATE:  03/03/2022    INTERPRETATION:  CLINICAL INDICATION: Right atrial mass reportedly seen   on outside hospital imaging. Referred for further characterization. Biventricular ICD.    EXAMINATION: EKG gated CT of the heart was performed after administration   of IV contrast. 60 cc of Omnipaque 350 administered. 32 cc discarded. Coronal and sagittal reformats were reviewed.   COMPARISON: Cardiac MRI from the same day, reported separately.  FINDINGS:   Cardiac Findings:  The heart is enlarged with small pericardial effusion extending into the superior pericardial recesses. Biventricular ICD leads are identified along the right atrial appendage, right ventricle, and left ventricle via middle cardiac vein. Streak artifact from ICD leads slightly limits evaluation.  On two separate delayed phases obtained, 45 second series 19 and 90 second series 20, there is no CT evidence of right atrial mass.  Non-Cardiac Findings:  Few prominent lymph nodes of the imaged mediastinum. Few calcified nodules are seen of the imaged lung parenchyma. Nonspecific dependent ground glass is noted. Degenerative changes of the visualized bones.  IMPRESSION:   No CT evidence of right atrial mass. Streak artifact from ICD leads slightly limits evaluation.   --- End of Report ---  ***Please see the addendum at the top of this report. It may contain additional important information or changes.****

## 2022-03-04 NOTE — PROGRESS NOTE ADULT - PROBLEM SELECTOR PLAN 5
Stable  Holding Lasix, Entresto given need for contrast for CT
Stable  Holding Lasix, Entresto given need for contrast for CT

## 2022-03-04 NOTE — DISCHARGE NOTE PROVIDER - NSDCMRMEDTOKEN_GEN_ALL_CORE_FT
ascorbic acid 500 mg oral tablet: 1 tab(s) orally once a day  atorvastatin 40 mg oral tablet: 1 tab(s) orally once a day  folic acid 1 mg oral tablet: 1 tab(s) orally once a day  hydrALAZINE 25 mg oral tablet: 1 tab(s) orally every 8 hours  isosorbide mononitrate 30 mg oral tablet, extended release: 1 tab(s) orally once a day (in the morning)  Lasix 40 mg oral tablet: 1 tab(s) orally once a day  metoprolol succinate 25 mg oral tablet, extended release: 1 tab(s) orally once a day  pantoprazole 40 mg oral delayed release tablet: 1 tab(s) orally once a day  thiamine 100 mg oral tablet: 1 tab(s) orally once a day   ascorbic acid 500 mg oral tablet: 1 tab(s) orally once a day  aspirin 81 mg oral tablet, chewable: 1 tab(s) orally once a day  atorvastatin 40 mg oral tablet: 1 tab(s) orally once a day  folic acid 1 mg oral tablet: 1 tab(s) orally once a day  hydrALAZINE 25 mg oral tablet: 1 tab(s) orally every 8 hours  isosorbide mononitrate 30 mg oral tablet, extended release: 1 tab(s) orally once a day (in the morning)  Lasix 40 mg oral tablet: 1 tab(s) orally once a day  metoprolol succinate 25 mg oral tablet, extended release: 1 tab(s) orally once a day  pantoprazole 40 mg oral delayed release tablet: 1 tab(s) orally once a day  thiamine 100 mg oral tablet: 1 tab(s) orally once a day

## 2022-03-04 NOTE — PROGRESS NOTE ADULT - PROBLEM SELECTOR PLAN 4
Not on ISS/metformin on xfer, no mention of A1C, glucose on CMP 70s  Monitor off insulin, DM diet
Not on ISS/metformin on xfer, no mention of A1C, glucose on CMP 70s  Monitor off insulin, DM diet
- Patient scheduled for Cardiac MRI in AM to r/o Atrial Mass

## 2022-03-04 NOTE — DISCHARGE NOTE PROVIDER - CARE PROVIDER_API CALL
Raul Chong)  Cardiology; Internal Medicine  [address] [place] [address]  Phone: [phone]  Fax: [phone]  Follow Up Time:

## 2022-03-04 NOTE — PROGRESS NOTE ADULT - PROBLEM SELECTOR PLAN 6
Transfer back to [facility] today- d/w transfer center, Dr. Chong accepting physician  45 minutes spent discharging the patient back to [facility]
Transfer back to [facility] after imaging completed

## 2022-03-04 NOTE — PROGRESS NOTE ADULT - PROBLEM SELECTOR PLAN 2
EF 20%, 2/2 to NICM. s/p [facility] BIV ICD, Currently on RA  Holding Lasix, Entresto given need for contrast for CT and CKD  Continue metoprolol, statin, Imdur
EF 20%, 2/2 to NICM. s/p [facility] BIV ICD, Currently on RA  Holding Lasix, Entresto given need for contrast for CT and CKD  Continue metoprolol, statin, Imdur
- DASH/TLC  - c/w home meds w/ holding parameters  - monitor BP & titrate BP meds PRN

## 2022-03-04 NOTE — DISCHARGE NOTE PROVIDER - NSDCCPCAREPLAN_GEN_ALL_CORE_FT
PRINCIPAL DISCHARGE DIAGNOSIS  Diagnosis: Chronic systolic congestive heart failure  Assessment and Plan of Treatment: Take your medications as prescribed. Follow a low-salt, low salt, low cholesterol heart healthy diet. Weigh yourself every day and keep a record; call your doctor if you gain 2 pounds over one to two days or 5 pounds over three days. Get to or maintain a healthy weight; ask your heart failure team for referrals to a registered dietitian if needed. Avoid alcohol. Be active (check with your physician or cardiologist first). Find healthy ways to deal with stress, such as deep breathing, meditation, exercise, and doing hobbies that you enjoy. If you smoke, quit. (A resource to help you stop smoking is the [facility] – phone number [phone].).        SECONDARY DISCHARGE DIAGNOSES  Diagnosis: HTN (hypertension)  Assessment and Plan of Treatment: Continue with your blood pressure medications; eat a heart healthy diet with low salt diet; exercise regularly (consult with your physician or cardiologist first); maintain a heart healthy weight; if you smoke - quit (A resource to help you stop smoking is the [facility] – phone number [phone].); include healthy ways to manage stress. Continue to follow with your primary care physician or cardiologist.    Diagnosis: HLD (hyperlipidemia)  Assessment and Plan of Treatment: Continue with your cholesterol medications. Eat a heart healthy diet that is low in saturated fats and salt, and includes whole grains, fruits, vegetables and lean protein; exercise regularly (consult with your physician or cardiologist first); maintain a heart healthy weight; if you smoke - quit (A resource to help you stop smoking is the [facility] – phone number [phone].). Continue to follow with your primary physician or cardiologist.

## 2022-03-04 NOTE — DISCHARGE NOTE PROVIDER - NSDCFUADDINST_GEN_ALL_CORE_FT
Low fat, low salt diet,   Daily weight (if you gain more than 2 lbs/day with having trouble breathing speak to your heart doctor)  Follow up with your cardiologist as scheduled.   take medication as prescribed.

## 2022-03-04 NOTE — PROGRESS NOTE ADULT - SUBJECTIVE AND OBJECTIVE BOX
Subjective/Observations: Patient scheduled for Cardiac MRI to r/o Atrial mass. Currently patient in NAD and with no complaints      REVIEW OF SYSTEMS: All other review of systems is negative unless indicated above    MEDICATIONS  (STANDING):  aspirin  chewable 81 milliGRAM(s) Oral daily  atorvastatin 40 milliGRAM(s) Oral at bedtime  heparin   Injectable 5000 Unit(s) SubCutaneous every 8 hours  isosorbide   mononitrate ER Tablet (IMDUR) 30 milliGRAM(s) Oral daily  metoprolol succinate ER 25 milliGRAM(s) Oral daily  pantoprazole    Tablet 40 milliGRAM(s) Oral before breakfast    MEDICATIONS  (PRN):      Allergies    No Known Allergies    Intolerances      Vital Signs Last 24 Hrs  T(C): 36.7 (03 Mar 2022 04:21), Max: 36.7 (03 Mar 2022 04:21)  T(F): 98 (03 Mar 2022 04:21), Max: 98 (03 Mar 2022 04:21)  HR: 80 (03 Mar 2022 04:21) (78 - 86)  BP: 142/95 (03 Mar 2022 04:21) (95/61 - 142/95)  BP(mean): 107 (03 Mar 2022 04:21) (70 - 107)  RR: 18 (03 Mar 2022 04:21) (16 - 18)  SpO2: 100% (03 Mar 2022 04:21) (98% - 100%)          I&O's Summary    02 Mar 2022 07:01  -  03 Mar 2022 05:06  --------------------------------------------------------  IN: 480 mL / OUT: 0 mL / NET: 480 mL          PHYSICAL EXAM:  General: WN/WD NAD  Neurology: Awake, nonfocal, MELO x 4  Respiratory: CTA B/L, No wheezing, rales, rhonchi  CV: RRR, S1S2, no murmurs, rubs or gallops  Abdominal: Soft, NT, ND +BS,   Extremities: No edema, + peripheral pulses  Skin: No Rashes, Hematoma, Ecchymosis  Psych: Oriented x 3, normal affect      LABS: All Labs Reviewed:                        16.1   6.86  )-----------( 230      ( 03 Mar 2022 00:53 )             52.3                         16.9   6.99  )-----------( 252      ( 02 Mar 2022 10:02 )             55.7     03 Mar 2022 00:53    135    |  97     |  14     ----------------------------<  78     4.2     |  25     |  1.51   02 Mar 2022 10:02    135    |  98     |  13     ----------------------------<  79     4.2     |  25     |  1.33     Ca    9.4        03 Mar 2022 00:53  Ca    9.8        02 Mar 2022 10:02  Mg     1.9       03 Mar 2022 00:53    TPro  8.3    /  Alb  3.7    /  TBili  1.6    /  DBili  x      /  AST  38     /  ALT  21     /  AlkPhos  146    02 Mar 2022 10:02    PT/INR - ( 02 Mar 2022 10:02 )   PT: 15.0 sec;   INR: 1.30 ratio                          
Luis Richard MD    Patient is a 36y old  Male who presents with a chief complaint of Transfer for Cardiac MRI (03 Mar 2022 05:05)        SUBJECTIVE / OVERNIGHT EVENTS: Patient underwent cardiac MRI this morning- reportedly non-diagnostic. CT coronaries now pending. Patient without complaints  TELEMETRY: /SR/ST 's      MEDICATIONS  (STANDING):  aspirin  chewable 81 milliGRAM(s) Oral daily  atorvastatin 40 milliGRAM(s) Oral at bedtime  heparin   Injectable 5000 Unit(s) SubCutaneous every 8 hours  isosorbide   mononitrate ER Tablet (IMDUR) 30 milliGRAM(s) Oral daily  metoprolol succinate ER 25 milliGRAM(s) Oral daily  pantoprazole    Tablet 40 milliGRAM(s) Oral before breakfast  sodium chloride 0.9% lock flush 3 milliLiter(s) IV Push every 8 hours    MEDICATIONS  (PRN):      Vital Signs Last 24 Hrs  T(C): 36.7 (03 Mar 2022 04:21), Max: 36.7 (03 Mar 2022 04:21)  T(F): 98 (03 Mar 2022 04:21), Max: 98 (03 Mar 2022 04:21)  HR: 90 (03 Mar 2022 11:11) (78 - 90)  BP: 124/88 (03 Mar 2022 11:11) (95/61 - 142/95)  BP(mean): 107 (03 Mar 2022 04:21) (70 - 107)  RR: 18 (03 Mar 2022 11:11) (18 - 18)  SpO2: 98% (03 Mar 2022 11:11) (97% - 100%)  CAPILLARY BLOOD GLUCOSE      POCT Blood Glucose.: 99 mg/dL (03 Mar 2022 11:03)  POCT Blood Glucose.: 77 mg/dL (03 Mar 2022 07:09)  POCT Blood Glucose.: 97 mg/dL (02 Mar 2022 21:12)  POCT Blood Glucose.: 128 mg/dL (02 Mar 2022 16:11)    I&O's Summary    02 Mar 2022 07:01  -  03 Mar 2022 07:00  --------------------------------------------------------  IN: 480 mL / OUT: 0 mL / NET: 480 mL    03 Mar 2022 07:01  -  03 Mar 2022 13:53  --------------------------------------------------------  IN: 480 mL / OUT: 0 mL / NET: 480 mL          PHYSICAL EXAM  GENERAL: NAD, well-developed  HEAD:  Atraumatic, normocephalic  EYES: EOMI, PERRLA, conjunctiva and sclera clear  CHEST/LUNG: Clear to auscultation bilaterally; no wheezes  HEART: +S1+S2, regular rate and rhythm  ABDOMEN: Soft, nontender, +distended; bowel sounds present  EXTREMITIES:  2+ peripheral pulses; no clubbing, cyanosis; +Pedal edema  PSYCH: AAOx3      LABS:                        16.1   6.86  )-----------( 230      ( 03 Mar 2022 00:53 )             52.3     03-03    135  |  97  |  14  ----------------------------<  78  4.2   |  25  |  1.51<H>    Ca    9.4      03 Mar 2022 00:53  Mg     1.9     03-03    TPro  8.3  /  Alb  3.7  /  TBili  1.6<H>  /  DBili  x   /  AST  38  /  ALT  21  /  AlkPhos  146<H>  03-02    PT/INR - ( 02 Mar 2022 10:02 )   PT: 15.0 sec;   INR: 1.30 ratio                     RADIOLOGY & ADDITIONAL TESTS:    Imaging Personally Reviewed:  Consultant(s) Notes Reviewed:    Care Discussed with Consultants/Other Providers:  
Luis Richard MD    Patient is a 36y old  Male who presents with a chief complaint of Transfer for Cardiac MRI (04 Mar 2022 04:50)        SUBJECTIVE / OVERNIGHT EVENTS: No new events  TELEMETRY: SR/:       MEDICATIONS  (STANDING):  aspirin  chewable 81 milliGRAM(s) Oral daily  atorvastatin 40 milliGRAM(s) Oral at bedtime  heparin   Injectable 5000 Unit(s) SubCutaneous every 8 hours  isosorbide   mononitrate ER Tablet (IMDUR) 30 milliGRAM(s) Oral daily  metoprolol succinate ER 25 milliGRAM(s) Oral daily  pantoprazole    Tablet 40 milliGRAM(s) Oral before breakfast  sodium chloride 0.9% lock flush 3 milliLiter(s) IV Push every 8 hours    MEDICATIONS  (PRN):      Vital Signs Last 24 Hrs  T(C): 36.8 (04 Mar 2022 08:37), Max: 36.8 (04 Mar 2022 08:37)  T(F): 98.2 (04 Mar 2022 08:37), Max: 98.2 (04 Mar 2022 08:37)  HR: 93 (04 Mar 2022 08:37) (89 - 93)  BP: 142/85 (04 Mar 2022 08:37) (141/98 - 142/85)  BP(mean): 101 (04 Mar 2022 08:37) (101 - 111)  RR: 18 (04 Mar 2022 08:37) (18 - 18)  SpO2: 98% (04 Mar 2022 08:37) (98% - 98%)  CAPILLARY BLOOD GLUCOSE      POCT Blood Glucose.: 99 mg/dL (04 Mar 2022 11:05)  POCT Blood Glucose.: 85 mg/dL (04 Mar 2022 07:06)  POCT Blood Glucose.: 74 mg/dL (03 Mar 2022 20:57)  POCT Blood Glucose.: 91 mg/dL (03 Mar 2022 16:00)    I&O's Summary    03 Mar 2022 07:01  -  04 Mar 2022 07:00  --------------------------------------------------------  IN: 480 mL / OUT: 0 mL / NET: 480 mL    04 Mar 2022 07:01  -  04 Mar 2022 11:31  --------------------------------------------------------  IN: 240 mL / OUT: 0 mL / NET: 240 mL          PHYSICAL EXAM  GENERAL: NAD, well-developed  HEAD:  Atraumatic, normocephalic  EYES: EOMI, PERRLA, conjunctiva and sclera clear  CHEST/LUNG: Clear to auscultation bilaterally; no wheezes  HEART: +S1+S2, regular rate and rhythm  ABDOMEN: Soft, nontender, +distended; bowel sounds present  EXTREMITIES:  2+ peripheral pulses; no clubbing, cyanosis; +Pedal edema  PSYCH: AAOx3      LABS:                        16.4   8.68  )-----------( 224      ( 04 Mar 2022 01:07 )             52.8     03-04    136  |  97  |  14  ----------------------------<  114<H>  4.4   |  26  |  1.20    Ca    9.8      04 Mar 2022 01:07  Mg     1.9     03-03                  RADIOLOGY & ADDITIONAL TESTS:    Imaging Personally Reviewed:  Consultant(s) Notes Reviewed:    Care Discussed with Consultants/Other Providers:

## 2022-03-04 NOTE — DISCHARGE NOTE NURSING/CASE MANAGEMENT/SOCIAL WORK - NSDCPEFALRISK_GEN_ALL_CORE
For information on Fall & Injury Prevention, visit: [email or web address] OR  [email or web address] OR  https://www.cdc.gov/steadi/patient.html

## 2022-03-04 NOTE — PROGRESS NOTE ADULT - PROBLEM SELECTOR PLAN 3
- lipid panel   -  c/w home meds
Hgb 16  W/u on prior admission with epo wnl, Jak2 negative  Continue aspirin
Hgb 16  W/u on prior admission with epo wnl, Jak2 negative  Continue aspirin

## 2022-03-04 NOTE — PROGRESS NOTE ADULT - PROBLEM SELECTOR PLAN 1
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
- hold home Entresto  - Fluid restriction, Strict Is and Os, telemetry monitoring, low salt diet

## 2022-03-04 NOTE — PROGRESS NOTE ADULT - PROBLEM SELECTOR PROBLEM 2
HTN (hypertension)
Heart failure with reduced ejection fraction
Heart failure with reduced ejection fraction

## 2022-03-04 NOTE — PROGRESS NOTE ADULT - ASSESSMENT
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
Patient is a 37 y/o M with hx of HFrEF 2/2 NICM with EF 20% s/p BIV ICD([facility], MRI conditional), covid 1/2022, presenting as transfer for cardiac MRI. Patient poor historian and no consistent summary in transfer documents. Per patient, presented to OSH for SOB after having ran out of medications for 1 week. On work up found to have "lung clots." Underwent hypercoagulable work up which was negative but imaging c/w right atrial mass. Concern for myxoma so transferred for cardiac MRI for further clarification. Currently patient denies cp/sob/n/v/le edema.

## 2022-03-29 ENCOUNTER — APPOINTMENT (OUTPATIENT)
Dept: ELECTROPHYSIOLOGY | Facility: CLINIC | Age: 37
End: 2022-03-29

## 2022-04-04 ENCOUNTER — FORM ENCOUNTER (OUTPATIENT)
Age: 37
End: 2022-04-04

## 2022-05-05 ENCOUNTER — NON-APPOINTMENT (OUTPATIENT)
Age: 37
End: 2022-05-05

## 2022-05-05 ENCOUNTER — APPOINTMENT (OUTPATIENT)
Dept: ELECTROPHYSIOLOGY | Facility: CLINIC | Age: 37
End: 2022-05-05
Payer: MEDICAID

## 2022-05-05 PROCEDURE — 93295 DEV INTERROG REMOTE 1/2/MLT: CPT

## 2022-05-05 PROCEDURE — 93296 REM INTERROG EVL PM/IDS: CPT

## 2022-06-03 NOTE — PROGRESS NOTE ADULT - I WAS PHYSICALLY PRESENT FOR THE KEY PORTIONS OF THE EVALUATION AND MANAGEMENT (E/M) SERVICE PROVIDED.  I AGREE WITH THE ABOVE HISTORY, PHYSICAL, AND PLAN WHICH I HAVE REVIEWED AND EDITED WHERE APPROPRIATE
Statement Selected
Unable to assess due to medical condition

## 2022-08-04 ENCOUNTER — APPOINTMENT (OUTPATIENT)
Dept: ELECTROPHYSIOLOGY | Facility: CLINIC | Age: 37
End: 2022-08-04

## 2022-08-04 ENCOUNTER — NON-APPOINTMENT (OUTPATIENT)
Age: 37
End: 2022-08-04

## 2022-08-04 PROCEDURE — 93295 DEV INTERROG REMOTE 1/2/MLT: CPT

## 2022-08-04 PROCEDURE — 93296 REM INTERROG EVL PM/IDS: CPT

## 2022-11-03 ENCOUNTER — NON-APPOINTMENT (OUTPATIENT)
Age: 37
End: 2022-11-03

## 2022-11-03 ENCOUNTER — APPOINTMENT (OUTPATIENT)
Dept: ELECTROPHYSIOLOGY | Facility: CLINIC | Age: 37
End: 2022-11-03

## 2022-11-03 PROCEDURE — 93295 DEV INTERROG REMOTE 1/2/MLT: CPT

## 2022-11-03 PROCEDURE — 93296 REM INTERROG EVL PM/IDS: CPT

## 2023-02-02 ENCOUNTER — APPOINTMENT (OUTPATIENT)
Dept: ELECTROPHYSIOLOGY | Facility: CLINIC | Age: 38
End: 2023-02-02
Payer: MEDICAID

## 2023-02-02 ENCOUNTER — NON-APPOINTMENT (OUTPATIENT)
Age: 38
End: 2023-02-02

## 2023-02-02 PROCEDURE — 93295 DEV INTERROG REMOTE 1/2/MLT: CPT

## 2023-02-02 PROCEDURE — 93296 REM INTERROG EVL PM/IDS: CPT

## 2023-05-04 ENCOUNTER — NON-APPOINTMENT (OUTPATIENT)
Age: 38
End: 2023-05-04

## 2023-05-04 ENCOUNTER — APPOINTMENT (OUTPATIENT)
Dept: ELECTROPHYSIOLOGY | Facility: CLINIC | Age: 38
End: 2023-05-04
Payer: MEDICAID

## 2023-05-04 PROCEDURE — 93296 REM INTERROG EVL PM/IDS: CPT

## 2023-05-04 PROCEDURE — 93295 DEV INTERROG REMOTE 1/2/MLT: CPT

## 2023-08-04 ENCOUNTER — NON-APPOINTMENT (OUTPATIENT)
Age: 38
End: 2023-08-04

## 2023-08-04 ENCOUNTER — APPOINTMENT (OUTPATIENT)
Dept: ELECTROPHYSIOLOGY | Facility: CLINIC | Age: 38
End: 2023-08-04
Payer: MEDICAID

## 2023-08-04 PROCEDURE — 93296 REM INTERROG EVL PM/IDS: CPT

## 2023-08-04 PROCEDURE — 93295 DEV INTERROG REMOTE 1/2/MLT: CPT

## 2023-11-03 ENCOUNTER — APPOINTMENT (OUTPATIENT)
Dept: ELECTROPHYSIOLOGY | Facility: CLINIC | Age: 38
End: 2023-11-03
Payer: MEDICAID

## 2023-11-03 ENCOUNTER — NON-APPOINTMENT (OUTPATIENT)
Age: 38
End: 2023-11-03

## 2023-11-03 PROCEDURE — 93296 REM INTERROG EVL PM/IDS: CPT

## 2023-11-03 PROCEDURE — 93295 DEV INTERROG REMOTE 1/2/MLT: CPT

## 2024-02-02 ENCOUNTER — NON-APPOINTMENT (OUTPATIENT)
Age: 39
End: 2024-02-02

## 2024-02-02 ENCOUNTER — APPOINTMENT (OUTPATIENT)
Dept: ELECTROPHYSIOLOGY | Facility: CLINIC | Age: 39
End: 2024-02-02
Payer: MEDICAID

## 2024-02-02 PROCEDURE — 93295 DEV INTERROG REMOTE 1/2/MLT: CPT

## 2024-02-02 PROCEDURE — 93296 REM INTERROG EVL PM/IDS: CPT

## 2024-04-15 NOTE — PROGRESS NOTE ADULT - PROBLEM/PLAN-5
DISPLAY PLAN FREE TEXT
Female
DISPLAY PLAN FREE TEXT

## 2024-05-02 ENCOUNTER — NON-APPOINTMENT (OUTPATIENT)
Age: 39
End: 2024-05-02

## 2024-05-03 ENCOUNTER — APPOINTMENT (OUTPATIENT)
Dept: ELECTROPHYSIOLOGY | Facility: CLINIC | Age: 39
End: 2024-05-03
Payer: SELF-PAY

## 2024-05-03 PROCEDURE — 93295 DEV INTERROG REMOTE 1/2/MLT: CPT

## 2024-05-03 PROCEDURE — 93296 REM INTERROG EVL PM/IDS: CPT

## 2024-05-08 NOTE — H&P ADULT - BP NONINVASIVE MEAN (MM HG)
Care Transitions Initial Follow Up Call    Call within 2 business days of discharge: Yes    Patient Current Location:  Home: [address] [place] [id number]    Care Transition Nurse contacted the patient by telephone to perform post hospital discharge assessment. Verified name and  with patient as identifiers. Provided introduction to self, and explanation of the Care Transition Nurse role.     Patient: Shadi Carreon Patient : 1947   MRN: [id number]  Reason for Admission: Weakness - fall  Discharge Date: 24 RARS: Readmission Risk Score: 12.5      Last Discharge Facility       Date Complaint Diagnosis Description Type Department Provider    24 Fall Hypoxia ... ED to Hosp-Admission (Discharged) (ADMITTED) [facility]W Zenon Victoria MD; Burak Hennessy.            Was this an external facility discharge? No Discharge Facility: [facility]    Challenges to be reviewed by the provider   Additional needs identified to be addressed with provider: No                 Method of communication with provider: none.    2nd attempt at CT discharge phone call. Shadi states he is \"doing better.\" He states [facility] is due [facility] his home today for a start of care. Shadi states he is using his home oxygen continuously @ 2lpm.  He states he has a pulse oximeter at home - his most recent O2 sat = 95%. He states he uses a quad cane for stability for ambulation. Shadi states he is no longer weak - he states he is back to normal. He denies any falls, cough, fever, or SOB at this time. Shadi states his legs are back to normal - denies any edema. He states the abrasion on his right head is gone and the left knee is a more significant scratch which remains. Shadi denies any needs at this time.    Care Transition Nurse reviewed discharge instructions with patient who verbalized understanding. The patient was given an opportunity to ask questions and does not have any further questions or concerns at this time. Were  92

## 2024-08-07 ENCOUNTER — NON-APPOINTMENT (OUTPATIENT)
Age: 39
End: 2024-08-07

## 2024-08-07 ENCOUNTER — APPOINTMENT (OUTPATIENT)
Dept: ELECTROPHYSIOLOGY | Facility: CLINIC | Age: 39
End: 2024-08-07

## 2024-08-07 PROCEDURE — 93296 REM INTERROG EVL PM/IDS: CPT

## 2024-08-07 PROCEDURE — 93295 DEV INTERROG REMOTE 1/2/MLT: CPT

## 2024-11-06 ENCOUNTER — APPOINTMENT (OUTPATIENT)
Dept: ELECTROPHYSIOLOGY | Facility: CLINIC | Age: 39
End: 2024-11-06

## 2024-11-11 ENCOUNTER — NON-APPOINTMENT (OUTPATIENT)
Age: 39
End: 2024-11-11

## 2024-11-11 ENCOUNTER — APPOINTMENT (OUTPATIENT)
Dept: ELECTROPHYSIOLOGY | Facility: CLINIC | Age: 39
End: 2024-11-11
Payer: MEDICAID

## 2024-11-11 PROCEDURE — 93295 DEV INTERROG REMOTE 1/2/MLT: CPT

## 2024-11-11 PROCEDURE — 93296 REM INTERROG EVL PM/IDS: CPT

## 2024-12-25 PROBLEM — F10.90 ALCOHOL USE: Status: INACTIVE | Noted: 2020-10-12

## 2025-02-10 ENCOUNTER — APPOINTMENT (OUTPATIENT)
Dept: ELECTROPHYSIOLOGY | Facility: CLINIC | Age: 40
End: 2025-02-10
Payer: MEDICAID

## 2025-02-10 ENCOUNTER — NON-APPOINTMENT (OUTPATIENT)
Age: 40
End: 2025-02-10

## 2025-02-10 PROCEDURE — 93295 DEV INTERROG REMOTE 1/2/MLT: CPT

## 2025-02-10 PROCEDURE — 93296 REM INTERROG EVL PM/IDS: CPT

## 2025-05-12 ENCOUNTER — APPOINTMENT (OUTPATIENT)
Dept: ELECTROPHYSIOLOGY | Facility: CLINIC | Age: 40
End: 2025-05-12
Payer: MEDICAID

## 2025-05-12 ENCOUNTER — NON-APPOINTMENT (OUTPATIENT)
Age: 40
End: 2025-05-12

## 2025-05-12 PROCEDURE — 93295 DEV INTERROG REMOTE 1/2/MLT: CPT

## 2025-05-12 PROCEDURE — 93296 REM INTERROG EVL PM/IDS: CPT

## 2025-08-11 ENCOUNTER — APPOINTMENT (OUTPATIENT)
Dept: ELECTROPHYSIOLOGY | Facility: CLINIC | Age: 40
End: 2025-08-11

## 2025-09-18 ENCOUNTER — APPOINTMENT (OUTPATIENT)
Dept: ELECTROPHYSIOLOGY | Facility: CLINIC | Age: 40
End: 2025-09-18